# Patient Record
Sex: FEMALE | Race: WHITE | Employment: FULL TIME | URBAN - METROPOLITAN AREA
[De-identification: names, ages, dates, MRNs, and addresses within clinical notes are randomized per-mention and may not be internally consistent; named-entity substitution may affect disease eponyms.]

---

## 2017-05-10 ENCOUNTER — HOSPITAL ENCOUNTER (EMERGENCY)
Age: 47
Discharge: HOME | End: 2017-05-10
Attending: EMERGENCY MEDICINE
Payer: COMMERCIAL

## 2017-05-10 ENCOUNTER — APPOINTMENT (OUTPATIENT)
Dept: CT IMAGING | Age: 47
End: 2017-05-10
Attending: EMERGENCY MEDICINE
Payer: COMMERCIAL

## 2017-05-10 VITALS
RESPIRATION RATE: 19 BRPM | HEART RATE: 104 BPM | BODY MASS INDEX: 29.16 KG/M2 | OXYGEN SATURATION: 93 % | WEIGHT: 175 LBS | HEIGHT: 65 IN | SYSTOLIC BLOOD PRESSURE: 125 MMHG | DIASTOLIC BLOOD PRESSURE: 64 MMHG

## 2017-05-10 DIAGNOSIS — S09.90XA CHI (CLOSED HEAD INJURY), INITIAL ENCOUNTER: ICD-10-CM

## 2017-05-10 DIAGNOSIS — N39.0 URINARY TRACT INFECTION WITHOUT HEMATURIA, SITE UNSPECIFIED: ICD-10-CM

## 2017-05-10 DIAGNOSIS — F10.920 ALCOHOL INTOXICATION, UNCOMPLICATED (HCC): Primary | ICD-10-CM

## 2017-05-10 LAB
ALBUMIN SERPL BCP-MCNC: 3.7 G/DL (ref 3.5–5)
ALBUMIN/GLOB SERPL: 1.1 {RATIO} (ref 1.1–2.2)
ALP SERPL-CCNC: 111 U/L (ref 45–117)
ALT SERPL-CCNC: 68 U/L (ref 12–78)
AMPHET UR QL SCN: NEGATIVE
ANION GAP BLD CALC-SCNC: 16 MMOL/L (ref 5–15)
APPEARANCE UR: ABNORMAL
AST SERPL W P-5'-P-CCNC: 76 U/L (ref 15–37)
BACTERIA URNS QL MICRO: ABNORMAL /HPF
BARBITURATES UR QL SCN: NEGATIVE
BASOPHILS # BLD AUTO: 0 K/UL (ref 0–0.1)
BASOPHILS # BLD: 0 % (ref 0–1)
BENZODIAZ UR QL: NEGATIVE
BILIRUB SERPL-MCNC: 0.3 MG/DL (ref 0.2–1)
BILIRUB UR QL: NEGATIVE
BUN SERPL-MCNC: 8 MG/DL (ref 6–20)
BUN/CREAT SERPL: 16 (ref 12–20)
CALCIUM SERPL-MCNC: 8.3 MG/DL (ref 8.5–10.1)
CANNABINOIDS UR QL SCN: NEGATIVE
CHLORIDE SERPL-SCNC: 110 MMOL/L (ref 97–108)
CK MB CFR SERPL CALC: 2 % (ref 0–2.5)
CK MB SERPL-MCNC: 5.4 NG/ML (ref 5–25)
CK SERPL-CCNC: 270 U/L (ref 26–192)
CO2 SERPL-SCNC: 20 MMOL/L (ref 21–32)
COCAINE UR QL SCN: NEGATIVE
COLOR UR: ABNORMAL
CREAT SERPL-MCNC: 0.51 MG/DL (ref 0.55–1.02)
DRUG SCRN COMMENT,DRGCM: NORMAL
EOSINOPHIL # BLD: 0.1 K/UL (ref 0–0.4)
EOSINOPHIL NFR BLD: 1 % (ref 0–7)
EPITH CASTS URNS QL MICRO: ABNORMAL /LPF
ERYTHROCYTE [DISTWIDTH] IN BLOOD BY AUTOMATED COUNT: 14.8 % (ref 11.5–14.5)
ETHANOL SERPL-MCNC: 334 MG/DL
GLOBULIN SER CALC-MCNC: 3.4 G/DL (ref 2–4)
GLUCOSE SERPL-MCNC: 94 MG/DL (ref 65–100)
GLUCOSE UR STRIP.AUTO-MCNC: NEGATIVE MG/DL
HCG UR QL: NEGATIVE
HCT VFR BLD AUTO: 40 % (ref 35–47)
HGB BLD-MCNC: 12.9 G/DL (ref 11.5–16)
HGB UR QL STRIP: ABNORMAL
KETONES UR QL STRIP.AUTO: NEGATIVE MG/DL
LEUKOCYTE ESTERASE UR QL STRIP.AUTO: ABNORMAL
LYMPHOCYTES # BLD AUTO: 34 % (ref 12–49)
LYMPHOCYTES # BLD: 1.7 K/UL (ref 0.8–3.5)
MCH RBC QN AUTO: 28.6 PG (ref 26–34)
MCHC RBC AUTO-ENTMCNC: 32.3 G/DL (ref 30–36.5)
MCV RBC AUTO: 88.7 FL (ref 80–99)
METHADONE UR QL: NEGATIVE
MONOCYTES # BLD: 0.3 K/UL (ref 0–1)
MONOCYTES NFR BLD AUTO: 6 % (ref 5–13)
NEUTS SEG # BLD: 3 K/UL (ref 1.8–8)
NEUTS SEG NFR BLD AUTO: 59 % (ref 32–75)
NITRITE UR QL STRIP.AUTO: POSITIVE
OPIATES UR QL: NEGATIVE
PCP UR QL: NEGATIVE
PH UR STRIP: 5 [PH] (ref 5–8)
PLATELET # BLD AUTO: 249 K/UL (ref 150–400)
POTASSIUM SERPL-SCNC: 3.7 MMOL/L (ref 3.5–5.1)
PROT SERPL-MCNC: 7.1 G/DL (ref 6.4–8.2)
PROT UR STRIP-MCNC: NEGATIVE MG/DL
RBC # BLD AUTO: 4.51 M/UL (ref 3.8–5.2)
RBC #/AREA URNS HPF: ABNORMAL /HPF (ref 0–5)
SODIUM SERPL-SCNC: 146 MMOL/L (ref 136–145)
SP GR UR REFRACTOMETRY: 1.01 (ref 1–1.03)
TROPONIN I SERPL-MCNC: <0.04 NG/ML
UA: UC IF INDICATED,UAUC: ABNORMAL
UROBILINOGEN UR QL STRIP.AUTO: 0.2 EU/DL (ref 0.2–1)
WBC # BLD AUTO: 5.1 K/UL (ref 3.6–11)
WBC URNS QL MICRO: ABNORMAL /HPF (ref 0–4)

## 2017-05-10 PROCEDURE — 82550 ASSAY OF CK (CPK): CPT | Performed by: EMERGENCY MEDICINE

## 2017-05-10 PROCEDURE — 74011000250 HC RX REV CODE- 250: Performed by: EMERGENCY MEDICINE

## 2017-05-10 PROCEDURE — 74011250636 HC RX REV CODE- 250/636: Performed by: PHYSICIAN ASSISTANT

## 2017-05-10 PROCEDURE — 36415 COLL VENOUS BLD VENIPUNCTURE: CPT | Performed by: EMERGENCY MEDICINE

## 2017-05-10 PROCEDURE — 99285 EMERGENCY DEPT VISIT HI MDM: CPT

## 2017-05-10 PROCEDURE — 81001 URINALYSIS AUTO W/SCOPE: CPT | Performed by: EMERGENCY MEDICINE

## 2017-05-10 PROCEDURE — 84484 ASSAY OF TROPONIN QUANT: CPT | Performed by: EMERGENCY MEDICINE

## 2017-05-10 PROCEDURE — 96366 THER/PROPH/DIAG IV INF ADDON: CPT

## 2017-05-10 PROCEDURE — 96365 THER/PROPH/DIAG IV INF INIT: CPT

## 2017-05-10 PROCEDURE — 85025 COMPLETE CBC W/AUTO DIFF WBC: CPT | Performed by: EMERGENCY MEDICINE

## 2017-05-10 PROCEDURE — 77030008467 HC STPLR SKN COVD -B

## 2017-05-10 PROCEDURE — 96361 HYDRATE IV INFUSION ADD-ON: CPT

## 2017-05-10 PROCEDURE — 87086 URINE CULTURE/COLONY COUNT: CPT | Performed by: EMERGENCY MEDICINE

## 2017-05-10 PROCEDURE — 80053 COMPREHEN METABOLIC PANEL: CPT | Performed by: EMERGENCY MEDICINE

## 2017-05-10 PROCEDURE — 81025 URINE PREGNANCY TEST: CPT | Performed by: EMERGENCY MEDICINE

## 2017-05-10 PROCEDURE — 96375 TX/PRO/DX INJ NEW DRUG ADDON: CPT

## 2017-05-10 PROCEDURE — 74011250636 HC RX REV CODE- 250/636: Performed by: EMERGENCY MEDICINE

## 2017-05-10 PROCEDURE — 72125 CT NECK SPINE W/O DYE: CPT

## 2017-05-10 PROCEDURE — 87077 CULTURE AEROBIC IDENTIFY: CPT | Performed by: EMERGENCY MEDICINE

## 2017-05-10 PROCEDURE — 74011250637 HC RX REV CODE- 250/637: Performed by: PHYSICIAN ASSISTANT

## 2017-05-10 PROCEDURE — L0172 CERV COL SR FOAM 2PC PRE OTS: HCPCS

## 2017-05-10 PROCEDURE — 80307 DRUG TEST PRSMV CHEM ANLYZR: CPT | Performed by: EMERGENCY MEDICINE

## 2017-05-10 PROCEDURE — 87186 SC STD MICRODIL/AGAR DIL: CPT | Performed by: EMERGENCY MEDICINE

## 2017-05-10 PROCEDURE — 93005 ELECTROCARDIOGRAM TRACING: CPT

## 2017-05-10 PROCEDURE — 70450 CT HEAD/BRAIN W/O DYE: CPT

## 2017-05-10 RX ORDER — SODIUM CHLORIDE 0.9 % (FLUSH) 0.9 %
5-10 SYRINGE (ML) INJECTION EVERY 8 HOURS
Status: DISCONTINUED | OUTPATIENT
Start: 2017-05-10 | End: 2017-05-11 | Stop reason: HOSPADM

## 2017-05-10 RX ORDER — ONDANSETRON 2 MG/ML
4 INJECTION INTRAMUSCULAR; INTRAVENOUS
Status: COMPLETED | OUTPATIENT
Start: 2017-05-10 | End: 2017-05-10

## 2017-05-10 RX ORDER — SODIUM CHLORIDE 0.9 % (FLUSH) 0.9 %
5-10 SYRINGE (ML) INJECTION AS NEEDED
Status: DISCONTINUED | OUTPATIENT
Start: 2017-05-10 | End: 2017-05-11 | Stop reason: HOSPADM

## 2017-05-10 RX ORDER — MECLIZINE HCL 12.5 MG 12.5 MG/1
25 TABLET ORAL
Status: COMPLETED | OUTPATIENT
Start: 2017-05-10 | End: 2017-05-10

## 2017-05-10 RX ORDER — ACETAMINOPHEN 500 MG
1000 TABLET ORAL ONCE
Status: COMPLETED | OUTPATIENT
Start: 2017-05-10 | End: 2017-05-10

## 2017-05-10 RX ORDER — CEPHALEXIN 500 MG/1
500 CAPSULE ORAL 3 TIMES DAILY
Qty: 15 CAP | Refills: 0 | Status: SHIPPED | OUTPATIENT
Start: 2017-05-10 | End: 2017-09-17

## 2017-05-10 RX ADMIN — MECLIZINE 25 MG: 12.5 TABLET ORAL at 19:33

## 2017-05-10 RX ADMIN — SODIUM CHLORIDE 1000 ML: 900 INJECTION, SOLUTION INTRAVENOUS at 17:54

## 2017-05-10 RX ADMIN — ONDANSETRON HYDROCHLORIDE 4 MG: 2 INJECTION, SOLUTION INTRAMUSCULAR; INTRAVENOUS at 19:33

## 2017-05-10 RX ADMIN — FOLIC ACID: 5 INJECTION, SOLUTION INTRAMUSCULAR; INTRAVENOUS; SUBCUTANEOUS at 19:34

## 2017-05-10 RX ADMIN — ACETAMINOPHEN 1000 MG: 500 TABLET ORAL at 20:12

## 2017-05-10 NOTE — ED NOTES
Patient is here via EMS after a GLF patient does not remember what happened and is very upset and anxious when she arrived.

## 2017-05-10 NOTE — ED PROVIDER NOTES
HPI Comments: Mary Carmen Vallejo, 55 y.o. female, presents via EMS to ED [facility] ED with cc of acute EtOH intoxication, posterior scalp laceration and GLF x PTA. She admits to drinking wine today as well as consistently over the last few days. She states that she has been starting to consume wine earlier in the day, and states she is depressed. The patient reports falling today without recollection of the HERLINDA. She is unsure if she fell d/t intoxication, or if she cannot remember falling d/t her \"white matter\" in the brain dx by her neurologist. The patient also complains of a few days of \"aching\" generalized abd pain, and neck pain x 1 day. She specifically denies other injuries. PCP: Lachelle Mcgregor MD    PMHx significant for: hypothyroidism, OA, dyspepsia, chronic pain, autoimmune dz, anxiety, depression, osteoporosis, anemia  PSHx significant for: bladder suspension, hysterectomy, back surgery, gastric bypass, hernia repair, cataract removal  Social history significant for: - Tobacco, + EtOH, - Illicit Drug Use    There are no other complaints, changes, or physical findings at this time. Written by ALEJO Ash, as dictated by Larry Wharton. Conrado Carrillo MD.     The history is provided by the patient. No  was used.         Past Medical History:   Diagnosis Date    Anemia     Anxiety     Arthritis     osteoarthritis    Autoimmune disease ([address]     Bowel trouble     Burning with urination     Chronic pain     Depression     Dyspepsia and other specified disorders of function of stomach     Headache     Hypothyroid     Osteoporosis        Past Surgical History:   Procedure Laterality Date    HX BLADDER SUSPENSION      HX CATARACT REMOVAL  12/14    HX GASTRIC BYPASS  8/1999    HX HERNIA REPAIR      HX HYSTERECTOMY  2007    NEUROLOGICAL PROCEDURE UNLISTED  2003, 2004    back surgery          Family History:   Problem Relation Age of Onset    Hypertension Father    [facility] Diabetes Father     Diabetes Paternal Aunt     Heart Disease Maternal Grandmother     Stroke Maternal Grandmother        Social History     Social History    Marital status:      Spouse name: N/A    Number of children: N/A    Years of education: N/A     Occupational History    Not on file. Social History Main Topics    Smoking status: Never Smoker    Smokeless tobacco: Not on file    Alcohol use No    Drug use: No    Sexual activity: Not on file     Other Topics Concern    Not on file     Social History Narrative         ALLERGIES: Review of patient's allergies indicates no known allergies. Review of Systems   Constitutional: Negative. Negative for activity change, appetite change, chills, fatigue, fever and unexpected weight change. HENT: Negative. Negative for congestion, hearing loss, rhinorrhea, sneezing and voice change. Eyes: Negative. Negative for pain and visual disturbance. Respiratory: Negative. Negative for apnea, cough, choking, chest tightness and shortness of breath. Cardiovascular: Negative. Negative for chest pain and palpitations. Gastrointestinal: Positive for abdominal pain. Negative for abdominal distention, blood in stool, diarrhea, nausea and vomiting. Endocrine: Negative for heat intolerance. Genitourinary: Negative. Negative for difficulty urinating, flank pain, frequency and urgency. No discharge   Musculoskeletal: Positive for neck pain. Negative for arthralgias, back pain, myalgias and neck stiffness. Positive for fall   Skin: Positive for wound. Negative for color change and rash. Allergic/Immunologic: Negative for immunocompromised state. Neurological: Negative. Negative for dizziness, seizures, syncope, speech difficulty, weakness, numbness and headaches. Hematological: Negative for adenopathy. Does not bruise/bleed easily. Psychiatric/Behavioral: Negative.   Negative for agitation, behavioral problems, dysphoric mood and suicidal ideas. The patient is not nervous/anxious. Positive for EtOH intoxication   All other systems reviewed and are negative. Vitals:    05/10/17 2100 05/10/17 2130 05/10/17 2200 05/10/17 2204   BP: 112/54 124/57 125/64    Pulse: (!) 112 (!) 120 (!) 105 (!) 104   Resp: 21 23 17 19   SpO2: 93% 93%     Weight:       Height:                Physical Exam   Nursing note and vitals reviewed. Physical Examination: General appearance - WDWN, in no apparent distress  Head - NC, dried blood in hair. Eyes - pupils equal, round and dilated, extraocular eye movements intact, conj/sclera clear, anicteric  Mouth - mucous membranes moist, pharynx normal without lesions  Nose/Ears - nares clear, Tms & canals clear  Neck - supple, no significant adenopathy, trachea midline, no crepitus, c spine tenderness, no step offs  Chest - Normal respiratory effort, clear to auscultation bilaterally, no wheezes/rales/rhonchi  Heart - mildly tachycardic rate and regular rhythm, S1 and S2 normal, no murmurs, gallops, or rubs  Abdomen - soft, nontender, nondistended, nabs, no masses, guarding, rebound or rigidity  Neurological - alert, oriented, normal speech, cranial nerves intact, no focal motor findings, motor & sensory diffusely intact, normal gait  Extremities/MS - peripheral pulses normal, no pedal edema, bruising to left upper arm, FROM, non-tender, no gross deformities, spine diffusely non-tender  Skin - normal coloration and turgor, no rashes, no lesions or lacerations  Written by Brenda Granger ED Scribe, as dictated by [employer].  Juan Miguel Gay MD.     MDM  Number of Diagnoses or Management Options  Alcohol intoxication, uncomplicated ([facility] [address]):   CHI (closed head injury), initial encounter:   Urinary tract infection without hematuria, site unspecified:   Diagnosis management comments:   Ddx: intoxication, fx, bleed        Amount and/or Complexity of Data Reviewed  Clinical lab tests: ordered and reviewed  Tests in the radiology section of CPT®: ordered and reviewed  Tests in the medicine section of CPT®: ordered and reviewed  Review and summarize past medical records: yes  Independent visualization of images, tracings, or specimens: yes    Patient Progress  Patient progress: stable    ED Course       Procedures     EKG interpretation: (Preliminary) 1757  Rhythm: sinus tachycardia; and regular . Rate (approx.): 118; Axis: normal; AK interval: normal; QRS interval: normal ; ST/T wave: normal; Other findings: ST o/w nml. Written by ALEJO Gonzalezibe, as dictated by Darien Wei MD.     Progress Note:  7:42 PM  Patient requesting for C-collar removal  Written by ALEJO Gonzalezibe, as dictated by Darien Wei MD.     Procedure Note - C-collar removed:   7:42 PM  Performed by: RN  C-spine cleared using NEXUS criteria. C-collar removed. Written by ALEJO Gonazlezibchandra, as dictated by Darien Wei MD.    Procedure Note - Laceration Repair:  7:59 PM  Procedure by Celestina Soto. Complexity: Simple  1.5 cm laceration to occipital scalp  was irrigated copiously with NS under jet lavage and draped in a sterile fashion. The wound was explored with the following results: No foreign bodies found. The wound was repaired with Dermabond. The wound was closed with good hemostasis and approximation. Sterile dressing applied. The procedure took 1-15 minutes, and pt tolerated well. Written by ALEJO Woodibchandra, as dictated by Celestina Soto. Progress Note:  10:11 PM  Per RN, pt's  has arrived and wishes to take the pt home.  is requesting referrals for detox clinics. Patient is able to walk with a steady gait, without assistance. Written by ALEJO Gonzalezibe, as dictated by Darien Wei MD.     LABORATORY TESTS:  Recent Results (from the past 12 hour(s))   URINALYSIS W/ REFLEX CULTURE    Collection Time: 05/10/17  5:27 PM Result Value Ref Range    Color YELLOW/STRAW      Appearance CLOUDY (A) CLEAR      Specific gravity 1.006 1.003 - 1.030      pH (UA) 5.0 5.0 - 8.0      Protein NEGATIVE  NEG mg/dL    Glucose NEGATIVE  NEG mg/dL    Ketone NEGATIVE  NEG mg/dL    Bilirubin NEGATIVE  NEG      Blood SMALL (A) NEG      Urobilinogen 0.2 0.2 - 1.0 EU/dL    Nitrites POSITIVE (A) NEG      Leukocyte Esterase SMALL (A) NEG      WBC 5-10 0 - 4 /hpf    RBC 0-5 0 - 5 /hpf    Epithelial cells FEW FEW /lpf    Bacteria 4+ (A) NEG /hpf    UA:UC IF INDICATED URINE CULTURE ORDERED (A) CNI     HCG URINE, QL    Collection Time: 05/10/17  5:27 PM   Result Value Ref Range    HCG urine, Ql. NEGATIVE  NEG     DRUG SCREEN, URINE    Collection Time: 05/10/17  5:27 PM   Result Value Ref Range    AMPHETAMINE NEGATIVE  NEG      BARBITURATES NEGATIVE  NEG      BENZODIAZEPINE NEGATIVE  NEG      COCAINE NEGATIVE  NEG      METHADONE NEGATIVE  NEG      OPIATES NEGATIVE  NEG      PCP(PHENCYCLIDINE) NEGATIVE  NEG      THC (TH-CANNABINOL) NEGATIVE  NEG      Drug screen comment (NOTE)    CBC WITH AUTOMATED DIFF    Collection Time: 05/10/17  5:38 PM   Result Value Ref Range    WBC 5.1 3.6 - 11.0 K/uL    RBC 4.51 3.80 - 5.20 M/uL    HGB 12.9 11.5 - 16.0 g/dL    HCT 40.0 35.0 - 47.0 %    MCV 88.7 80.0 - 99.0 FL    MCH 28.6 26.0 - 34.0 PG    MCHC 32.3 30.0 - 36.5 g/dL    RDW 14.8 (H) 11.5 - 14.5 %    PLATELET 551 959 - 675 K/uL    NEUTROPHILS 59 32 - 75 %    LYMPHOCYTES 34 12 - 49 %    MONOCYTES 6 5 - 13 %    EOSINOPHILS 1 0 - 7 %    BASOPHILS 0 0 - 1 %    ABS. NEUTROPHILS 3.0 1.8 - 8.0 K/UL    ABS. LYMPHOCYTES 1.7 0.8 - 3.5 K/UL    ABS. MONOCYTES 0.3 0.0 - 1.0 K/UL    ABS. EOSINOPHILS 0.1 0.0 - 0.4 K/UL    ABS.  BASOPHILS 0.0 0.0 - 0.1 K/UL   METABOLIC PANEL, COMPREHENSIVE    Collection Time: 05/10/17  5:38 PM   Result Value Ref Range    Sodium 146 (H) 136 - 145 mmol/L    Potassium 3.7 3.5 - 5.1 mmol/L    Chloride 110 (H) 97 - 108 mmol/L    CO2 20 (L) 21 - 32 mmol/L    Anion gap 16 (H) 5 - 15 mmol/L    Glucose 94 65 - 100 mg/dL    BUN 8 6 - 20 MG/DL    Creatinine 0.51 (L) 0.55 - 1.02 MG/DL    BUN/Creatinine ratio 16 12 - 20      GFR est AA >60 >60 ml/min/1.73m2    GFR est non-AA >60 >60 ml/min/1.73m2    Calcium 8.3 (L) 8.5 - 10.1 MG/DL    Bilirubin, total 0.3 0.2 - 1.0 MG/DL    ALT (SGPT) 68 12 - 78 U/L    AST (SGOT) 76 (H) 15 - 37 U/L    Alk. phosphatase 111 45 - 117 U/L    Protein, total 7.1 6.4 - 8.2 g/dL    Albumin 3.7 3.5 - 5.0 g/dL    Globulin 3.4 2.0 - 4.0 g/dL    A-G Ratio 1.1 1.1 - 2.2     ETHYL ALCOHOL    Collection Time: 05/10/17  5:38 PM   Result Value Ref Range    ALCOHOL(ETHYL),SERUM 334 (H) <10 MG/DL   CK W/ CKMB & INDEX    Collection Time: 05/10/17  5:38 PM   Result Value Ref Range     (H) 26 - 192 U/L    CK - MB 5.4 (H) <3.6 NG/ML    CK-MB Index 2.0 0 - 2.5     TROPONIN I    Collection Time: 05/10/17  5:38 PM   Result Value Ref Range    Troponin-I, Qt. <0.04 <0.05 ng/mL   EKG, 12 LEAD, INITIAL    Collection Time: 05/10/17  5:57 PM   Result Value Ref Range    Ventricular Rate 118 BPM    Atrial Rate 118 BPM    P-R Interval 164 ms    QRS Duration 60 ms    Q-T Interval 320 ms    QTC Calculation (Bezet) 448 ms    Calculated P Axis 53 degrees    Calculated R Axis 16 degrees    Calculated T Axis 41 degrees    Diagnosis       Sinus tachycardia  When compared with ECG of 22-NOV-2016 11:22,  No significant change was found         IMAGING RESULTS:  CT Results  (Last 48 hours)               05/10/17 1858  CT HEAD WO CONT Final result    Impression:  IMPRESSION: No acute intracranial findings. Narrative:  EXAM:  CT HEAD WO CONT       INDICATION:   head trauma       COMPARISON: None. TECHNIQUE: Unenhanced CT of the head was performed using 5 mm images. Brain and   bone windows were generated. CT dose reduction was achieved through use of a   standardized protocol tailored for this examination and automatic exposure   control for dose modulation. FINDINGS:   The ventricles and sulci are normal in size, shape and configuration and   midline. There is no significant white matter disease. There is no intracranial   hemorrhage, extra-axial collection, mass, mass effect or midline shift. The   basilar cisterns are open. No acute infarct is identified. The bone windows   demonstrate no abnormalities. The visualized portions of the paranasal sinuses   and mastoid air cells are clear. There is posterior scalp soft tissue swelling. 05/10/17 1858  CT SPINE CERV WO CONT Final result    Impression:  IMPRESSION:   No evidence of acute traumatic injury to the cervical spine. Narrative:  EXAM:  CT CERVICAL SPINE WITHOUT CONTRAST       INDICATION:   Neck pain, chronic, abn neuro exam, xray negative; fall, neck   pain, trauma. COMPARISON: None. TECHNIQUE: Multislice helical CT of the cervical spine was performed without   intravenous contrast administration. Sagittal and coronal reconstructions were   generated. CT dose reduction was achieved through use of a standardized   protocol tailored for this examination and automatic exposure control for dose   modulation. CONTRAST: None. FINDINGS:       The alignment is within normal limits. There is no fracture or subluxation. The   odontoid process is intact. The craniocervical junction is within normal limits. The prevertebral soft tissues are within normal limits. There is spurring   between the anterior arch of C1 and the odontoid. C2-C3:  There is no spinal canal or neural foraminal stenosis. C3-C4:  There is no spinal canal or neural foraminal stenosis. C4-C5:  There is no spinal canal or neural foraminal stenosis. C5-C6:  There is disc space narrowing. There is uncinate process hypertrophy   without significant neural foraminal narrowing. C6-C7:  There is no spinal canal or neural foraminal stenosis.        C7-T1:  There is no spinal canal or neural foraminal stenosis. MEDICATIONS GIVEN:  Medications   sodium chloride (NS) flush 5-10 mL (not administered)   sodium chloride (NS) flush 5-10 mL (not administered)   sodium chloride 0.9 % bolus infusion 1,000 mL (0 mL IntraVENous IV Completed 5/10/17 2016)   0.9% sodium chloride 1,000 mL with mvi, adult no. 4 with vit K 10 mL, thiamine 522 mg, folic acid 1 mg infusion ( IntraVENous IV Completed 5/10/17 2225)   ondansetron (ZOFRAN) injection 4 mg (4 mg IntraVENous Given 5/10/17 1933)   meclizine (ANTIVERT) tablet 25 mg (25 mg Oral Given 5/10/17 1933)   acetaminophen (TYLENOL) tablet 1,000 mg (1,000 mg Oral Given 5/10/17 2012)       IMPRESSION:  1. Alcohol intoxication, uncomplicated ([facility] [address]    2. CHI (closed head injury), initial encounter    3. Urinary tract infection without hematuria, site unspecified        PLAN:  1. Discharge Medication List as of 5/10/2017 10:13 PM      START taking these medications    Details   cephALEXin (KEFLEX) 500 mg capsule Take 1 Cap by mouth three (3) times daily. , Print, Disp-15 Cap, R-0         CONTINUE these medications which have NOT CHANGED    Details   ALPRAZolam (XANAX) 1 mg tablet Take 0.5 Tabs by mouth two (2) times daily as needed for Anxiety for up to 10 doses. Max Daily Amount: 1 mg., Print, Disp-5 Tab, R-0      pregabalin (LYRICA) 50 mg capsule Take 1 Cap by mouth two (2) times a day. Max Daily Amount: 100 mg., Print, Disp-60 Cap, R-0      traMADol (ULTRAM) 50 mg tablet Take 1 Tab by mouth every six (6) hours as needed for Pain. Max Daily Amount: 200 mg. Indications: PAIN, No Print, Disp-1 Tab, R-0      L. acidoph & paracasei- S therm- Bifido (KATT-Q) 8 billion cell cap cap Take 1 Cap by mouth daily. , Print, Disp-30 Cap, R-1      magnesium chloride (SLOW MAG) 71.5 mg tablet Take 1 Tab by mouth daily. , Print, Disp-30 Tab, R-5      VIT C/VIT E/LUTEIN/MIN/OMEGA-3 (OCUVITE PO) Take  by mouth., Historical Med      levothyroxine (SYNTHROID) 50 mcg tablet Take  by mouth Daily (before breakfast). , Historical Med      cyanocobalamin (VITAMIN B-12) 1,000 mcg/mL injection 1,000 mcg by IntraMUSCular route every thirty (30) days. , Historical Med      ergocalciferol (VITAMIN D2) 50,000 unit capsule Take 50,000 Units by mouth Every Thursday., Historical Med      furosemide (LASIX) 20 mg tablet Take  by mouth as needed., Historical Med           2. Follow-up Information     Follow up With Details Comments Contact Info    Priscila Coker MD Schedule an appointment as soon as possible for a visit  [facility] [address] [facility]  [phone]          Return to ED if worse     Discharge Note:  10:14 PM  The pt is ready for discharge. The pt's signs, symptoms, diagnosis, and discharge instructions have been discussed and pt has conveyed their understanding. The pt is to follow up as recommended or return to ER should their symptoms worsen. Plan has been discussed and pt is in agreement. This note is prepared by Ezio Munoz, acting as a Scribe for [employer]. Sarika Young, [address] [place] [address]. Sarika Young MD: The scribe's documentation has been prepared under my direction and personally reviewed by me in its entirety. I confirm that the notes above accurately reflects all work, treatment, procedures, and medical decision making performed by me.

## 2017-05-11 LAB
ATRIAL RATE: 118 BPM
CALCULATED P AXIS, ECG09: 53 DEGREES
CALCULATED R AXIS, ECG10: 16 DEGREES
CALCULATED T AXIS, ECG11: 41 DEGREES
DIAGNOSIS, 93000: NORMAL
P-R INTERVAL, ECG05: 164 MS
Q-T INTERVAL, ECG07: 320 MS
QRS DURATION, ECG06: 60 MS
QTC CALCULATION (BEZET), ECG08: 448 MS
VENTRICULAR RATE, ECG03: 118 BPM

## 2017-05-11 NOTE — DISCHARGE INSTRUCTIONS
Acute Alcohol Intoxication: Care Instructions  Your Care Instructions  You have had treatment to help your body rid itself of alcohol. Too much alcohol upsets the body's fluid balance. Your doctor may have given you fluids and vitamins. For some people, drinking too much alcohol is a one-time event. For others, it is an ongoing problem. In either case, it is serious. It can be life-threatening. Follow-up care is a key part of your treatment and safety. Be sure to make and go to all appointments, and call your doctor if you are having problems. It's also a good idea to know your test results and keep a list of the medicines you take. How can you care for yourself at home? · Be safe with medicines. Take your medicines exactly as prescribed. Call your doctor if you think you are having a problem with your medicine. · Your doctor may have prescribed disulfiram (Antabuse). Do not drink any alcohol while you are taking this medicine. You may have severe or even life-threatening side effects from even small amounts of alcohol. · If you were given medicine to prevent nausea, be sure to take it exactly as prescribed. · Before you take any medicine, tell your doctor if:  ¨ You have had a bad reaction to any medicines in the past.  ¨ You are taking other medicines, including over-the-counter ones, or have other health problems. ¨ You are or could be pregnant. · Be prepared to have some symptoms of withdrawal in the next few days. · Drink plenty of liquids in the next few days. · Seek help if you need it to stop drinking. Getting counseling and joining a support group can help you stay sober. Try a support group such as Alcoholics Anonymous. · Avoid alcohol when you take medicines. It can react with many medicines and cause serious problems. When should you call for help? Call 911 anytime you think you may need emergency care.  For example, call if:  · You feel confused and are seeing things that are not there.  · You are thinking about killing yourself or hurting others. · You have a seizure. · You vomit blood or what looks like coffee grounds. Call your doctor now or seek immediate medical care if:  · You have trembling, restlessness, sweating, and other withdrawal symptoms that are new or that get worse. · Your withdrawal symptoms come back after not bothering you for days or weeks. · You can't stop vomiting. Watch closely for changes in your health, and be sure to contact your doctor if:  · You need help to stop drinking. Where can you learn more? Go to http://praful-monserrat.info/. Enter T102 in the search box to learn more about \"Acute Alcohol Intoxication: Care Instructions. \"  Current as of: November 3, 2016  Content Version: 11.2  © 6380-4367 [employer]. Care instructions adapted under license by [facility] [employer] [facility] (which disclaims liability or warranty for this information). If you have questions about a medical condition or this instruction, always ask your healthcare professional. [place] [address] any warranty or liability for your use of this information. Urinary Tract Infection in Women: Care Instructions  Your Care Instructions    A urinary tract infection, or UTI, is a general term for an infection anywhere between the kidneys and the urethra (where urine comes out). Most UTIs are bladder infections. They often cause pain or burning when you urinate. UTIs are caused by bacteria and can be cured with antibiotics. Be sure to complete your treatment so that the infection goes away. Follow-up care is a key part of your treatment and safety. Be sure to make and go to all appointments, and call your doctor if you are having problems. It's also a good idea to know your test results and keep a list of the medicines you take. How can you care for yourself at home? · Take your antibiotics as directed.  Do not stop taking them just because you feel better. You need to take the full course of antibiotics. · Drink extra water and other fluids for the next day or two. This may help wash out the bacteria that are causing the infection. (If you have kidney, heart, or liver disease and have to limit fluids, talk with your doctor before you increase your fluid intake.)  · Avoid drinks that are carbonated or have caffeine. They can irritate the bladder. · Urinate often. Try to empty your bladder each time. · To relieve pain, take a hot bath or lay a heating pad set on low over your lower belly or genital area. Never go to sleep with a heating pad in place. To prevent UTIs  · Drink plenty of water each day. This helps you urinate often, which clears bacteria from your system. (If you have kidney, heart, or liver disease and have to limit fluids, talk with your doctor before you increase your fluid intake.)  · Urinate when you need to. · Urinate right after you have sex. · Change sanitary pads often. · Avoid douches, bubble baths, feminine hygiene sprays, and other feminine hygiene products that have deodorants. · After going to the bathroom, wipe from front to back. When should you call for help? Call your doctor now or seek immediate medical care if:  · Symptoms such as fever, chills, nausea, or vomiting get worse or appear for the first time. · You have new pain in your back just below your rib cage. This is called flank pain. · There is new blood or pus in your urine. · You have any problems with your antibiotic medicine. Watch closely for changes in your health, and be sure to contact your doctor if:  · You are not getting better after taking an antibiotic for 2 days. · Your symptoms go away but then come back. Where can you learn more? Go to http://praful-monserrat.info/. Enter S208 in the search box to learn more about \"Urinary Tract Infection in Women: Care Instructions. \"  Current as of: November 28, 2016  Content Version: 11.2  © 4575-5511 [employer]. Care instructions adapted under license by [employer] (which disclaims liability or warranty for this information). If you have questions about a medical condition or this instruction, always ask your healthcare professional. [address] any warranty or liability for your use of this information. Head Injury: After Your Visit  Your Care Instructions  You have had a concussion. A concussion means you hit your head hard enough to injure your brain. You may have symptoms that last for a few days to months. You may also have changes in how well you think, concentrate, or remember; changes in your sleep patterns; or other symptoms. Although this is common after a concussion, any symptoms that are new or that are getting worse could be signs of a more serious problem. The doctor has checked you carefully, but problems can develop later. If you notice any problems or new symptoms, get medical treatment right away. Follow-up care is a key part of your treatment and safety. Be sure to make and go to all appointments, and call your doctor if you are having problems. It's also a good idea to know your test results and keep a list of the medicines you take. How can you care for yourself at home? · Have another adult watch you closely for the next 24 hours. That person should check for signs that your head injury is getting worse. · Put ice or a cold pack on the sore area for 10 to 20 minutes at a time. Put a thin cloth between the ice and your skin. · Ask your doctor if you can take an over-the-counter pain medicine, such as acetaminophen (Tylenol), ibuprofen (Advil, Motrin), or naproxen (Aleve). Read and follow all instructions on the label. Do not take two or more pain medicines at the same time unless the doctor told you to. Many pain medicines have acetaminophen, which is Tylenol. Too much acetaminophen (Tylenol) can be harmful.   · You may sleep. If your doctor tells you to, have another adult check you at the suggested times to make sure you are able to wake up, recognize the other adult, and act normally. · Take it easy for the next few days or longer if you are not feeling well. · Do not drink any alcohol for 24 hours or until your doctor tells you it is okay. When should you call for help? Call 911 anytime you think you may need emergency care. For example, call if:  · You have a seizure. · You passed out (lost consciousness). · You feel very sleepy or confused. Call your doctor now or seek immediate medical care if:  · You have a new or worse headache. · You have new or worse nausea or vomiting. · You have a new watery (not like mucus from a cold) or bloody fluid coming from your nose or ears. · You have tingling, weakness, or numbness in any part of your body. · You have trouble walking. Watch closely for changes in your health, and be sure to contact your doctor if you do not get better as expected. Where can you learn more? Go to Tamtron.be  Enter X549 in the search box to learn more about \"Head Injury: After Your Visit. \"   © 4003-7808 [employer]. Care instructions adapted under license by [employer] (which disclaims liability or warranty for this information). This care instruction is for use with your licensed healthcare professional. If you have questions about a medical condition or this instruction, always ask your healthcare professional. [place] [address] any warranty or liability for your use of this information.   Content Version: 1.4.777276; Last Revised: June 27, 2012

## 2017-05-11 NOTE — ED NOTES
Patient assisted into wheelchair for discharge. Wheeled patient out to car and assisted patient into car.

## 2017-05-11 NOTE — ED NOTES
Discharge paper work given to patient by DR. Tonya Fulton patients questions and concerns answered.  Patient discharged

## 2017-05-12 LAB
BACTERIA SPEC CULT: ABNORMAL
BACTERIA SPEC CULT: ABNORMAL
CC UR VC: ABNORMAL
SERVICE CMNT-IMP: ABNORMAL

## 2017-05-12 RX ORDER — NITROFURANTOIN 25; 75 MG/1; MG/1
100 CAPSULE ORAL 2 TIMES DAILY
Qty: 14 CAP | Refills: 0 | Status: SHIPPED | OUTPATIENT
Start: 2017-05-12 | End: 2017-05-19

## 2017-05-12 NOTE — PROGRESS NOTES
Pt contacted re: urine culture results. Pt remains symptomatic. Macrobid efiled with [facility] [address] at her request.  She reportedly has had difficulty with clearing multiple urinary tract infections within the last few months. Advised the [address] should cover; however, given intermediate sensitivity to the second strain of ecoli, recommended follow up with her Urologist.  Good understanding noted.

## 2017-09-17 ENCOUNTER — HOSPITAL ENCOUNTER (EMERGENCY)
Age: 47
Discharge: HOME | End: 2017-09-17
Attending: EMERGENCY MEDICINE
Payer: COMMERCIAL

## 2017-09-17 ENCOUNTER — APPOINTMENT (OUTPATIENT)
Dept: GENERAL RADIOLOGY | Age: 47
End: 2017-09-17
Attending: PHYSICIAN ASSISTANT
Payer: COMMERCIAL

## 2017-09-17 VITALS
HEIGHT: 64 IN | TEMPERATURE: 98.4 F | WEIGHT: 173.06 LBS | HEART RATE: 97 BPM | OXYGEN SATURATION: 95 % | BODY MASS INDEX: 29.55 KG/M2 | SYSTOLIC BLOOD PRESSURE: 157 MMHG | RESPIRATION RATE: 16 BRPM | DIASTOLIC BLOOD PRESSURE: 100 MMHG
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
## 2017-09-17 DIAGNOSIS — T07.XXXA MULTIPLE CONTUSIONS: ICD-10-CM

## 2017-09-17 DIAGNOSIS — S93.602A SPRAIN OF LEFT FOOT, INITIAL ENCOUNTER: Primary | ICD-10-CM

## 2017-09-17 PROCEDURE — 99284 EMERGENCY DEPT VISIT MOD MDM: CPT

## 2017-09-17 PROCEDURE — 73620 X-RAY EXAM OF FOOT: CPT

## 2017-09-17 PROCEDURE — 77030036687 HC SHOE PSTOP S2SG -A

## 2017-09-17 PROCEDURE — 74011250637 HC RX REV CODE- 250/637: Performed by: PHYSICIAN ASSISTANT

## 2017-09-17 RX ORDER — NEBIVOLOL 5 MG/1
5 TABLET ORAL
Status: ON HOLD | COMMUNITY
End: 2019-05-06

## 2017-09-17 RX ORDER — HYDROCODONE BITARTRATE AND ACETAMINOPHEN 5; 325 MG/1; MG/1
1 TABLET ORAL
Qty: 10 TAB | Refills: 0 | Status: SHIPPED | OUTPATIENT
Start: 2017-09-17 | End: 2017-09-20

## 2017-09-17 RX ORDER — ONDANSETRON 4 MG/1
4 TABLET, ORALLY DISINTEGRATING ORAL
Qty: 10 TAB | Refills: 0 | Status: SHIPPED | OUTPATIENT
Start: 2017-09-17 | End: 2017-11-16

## 2017-09-17 RX ORDER — HYDROCODONE BITARTRATE AND ACETAMINOPHEN 5; 325 MG/1; MG/1
1 TABLET ORAL
Status: COMPLETED | OUTPATIENT
Start: 2017-09-17 | End: 2017-09-17

## 2017-09-17 RX ORDER — ONDANSETRON 4 MG/1
4 TABLET, ORALLY DISINTEGRATING ORAL
Status: COMPLETED | OUTPATIENT
Start: 2017-09-17 | End: 2017-09-17

## 2017-09-17 RX ADMIN — HYDROCODONE BITARTRATE AND ACETAMINOPHEN 1 TABLET: 5; 325 TABLET ORAL at 10:15

## 2017-09-17 RX ADMIN — ONDANSETRON 4 MG: 4 TABLET, ORALLY DISINTEGRATING ORAL at 10:20

## 2017-09-17 NOTE — ED NOTES
THELMA Powell reviewed discharge instructions with the patient and spouse. The patient and spouse verbalized understanding. Patient discharged home with vitals at baseline. Patient medicated prior to discharge and has transportation provided by spouse. Patient ambulatory to vehicle with use of crutches.

## 2017-09-17 NOTE — DISCHARGE INSTRUCTIONS
Foot Sprain: Care Instructions  Your Care Instructions    A foot sprain occurs when you stretch or tear the ligaments around your foot. Ligaments are the tough tissues that connect one bone to another. A sprain can happen when you run, fall, or hit your toe against something. Sprains often happen when you jump or change direction quickly. This may occur when you play basketball, soccer, or other sports. Most foot sprains will get better with treatment at home. Follow-up care is a key part of your treatment and safety. Be sure to make and go to all appointments, and call your doctor if you are having problems. It's also a good idea to know your test results and keep a list of the medicines you take. How can you care for yourself at home? · Walk or put weight on your sprained foot as long as it does not hurt. · If your doctor gave you a splint or immobilizer, wear it as directed. If you were given crutches, use them as directed. · For the first 2 days after your injury, avoid hot showers, hot tubs, or hot packs. They may increase swelling. · Put ice or a cold pack on your foot for 10 to 20 minutes at a time to stop swelling. Try this every 1 to 2 hours for 3 days (when you are awake) or until the swelling goes down. Put a thin cloth between the ice pack and your skin. Keep your splint dry. · After 2 or 3 days, if your swelling is gone, put a heating pad (set on low) or a warm cloth on your foot. Some doctors suggest that you go back and forth between hot and cold treatments. · Prop up your foot on a pillow when you ice it or anytime you sit or lie down. Try to keep it above the level of your heart. This will help reduce swelling. · Take pain medicines exactly as directed. ¨ If the doctor gave you a prescription medicine for pain, take it as prescribed. ¨ If you are not taking a prescription pain medicine, ask your doctor if you can take an over-the-counter medicine.   · Do any exercises that your doctor or physical therapist suggests. · Return to your usual exercise gradually as you feel better. When should you call for help? Call your doctor now or seek immediate medical care if:  · You have increased or severe pain. · Your toes are cool or pale or change color. · Your wrap or splint feels too tight. · You have signs of a blood clot, such as:  ¨ Pain in your calf, back of the knee, thigh, or groin. ¨ Redness and swelling in your leg or groin. · You have tingling, weakness, or numbness in your leg or foot. Watch closely for changes in your health, and be sure to contact your doctor if:  · You cannot put any weight on your foot. · You get a fever. · You do not get better as expected. Where can you learn more? Go to http://praful-monserrat.info/. Enter S947 in the search box to learn more about \"Foot Sprain: Care Instructions. \"  Current as of: March 21, 2017  Content Version: 11.3  © 8892-0734 [employer]. Care instructions adapted under license by [employer] (which disclaims liability or warranty for this information). If you have questions about a medical condition or this instruction, always ask your healthcare professional. [place] [address] any warranty or liability for your use of this information. Bruises: Care Instructions  Your Care Instructions    Bruises occur when small blood vessels under the skin tear or rupture, most often from a twist, bump, or fall. Blood leaks into tissues under the skin and causes a black-and-blue spot that often turns colors, including purplish black, reddish blue, or yellowish green, as the bruise heals. Bruises hurt, but most are not serious and will go away on their own within 2 to 4 weeks. Sometimes, gravity causes them to spread down the body. A leg bruise usually will take longer to heal than a bruise on the face or arms. Follow-up care is a key part of your treatment and safety.  Be sure to make and go to all appointments, and call your doctor if you are having problems. Its also a good idea to know your test results and keep a list of the medicines you take. How can you care for yourself at home? · Take pain medicines exactly as directed. ¨ If the doctor gave you a prescription medicine for pain, take it as prescribed. ¨ If you are not taking a prescription pain medicine, ask your doctor if you can take an over-the-counter medicine. · Put ice or a cold pack on the area for 10 to 20 minutes at a time. Put a thin cloth between the ice and your skin. · If you can, prop up the bruised area on pillows as much as possible for the next few days. Try to keep the bruise above the level of your heart. When should you call for help? Call your doctor now or seek immediate medical care if:  · You have signs of infection, such as:  ¨ Increased pain, swelling, warmth, or redness. ¨ Red streaks leading from the bruise. ¨ Pus draining from the bruise. ¨ A fever. · You have a bruise on your leg and signs of a blood clot, such as:  ¨ Increasing redness and swelling along with warmth, tenderness, and pain in the bruised area. ¨ Pain in your calf, back of the knee, thigh, or groin. ¨ Redness and swelling in your leg or groin. · Your pain gets worse. Watch closely for changes in your health, and be sure to contact your doctor if:  · You do not get better as expected. Where can you learn more? Go to http://praful-monserrat.info/. Enter [phone] in the search box to learn more about \"Bruises: Care Instructions. \"  Current as of: March 20, 2017  Content Version: 11.3  © 0898-7868 [employer]. Care instructions adapted under license by [facility] (which disclaims liability or warranty for this information).  If you have questions about a medical condition or this instruction, always ask your healthcare professional. Alvaro Elkins disclaims any warranty or liability for your use of this information.

## 2017-09-17 NOTE — ED PROVIDER NOTES
HPI Comments: Tim Disla is a 55 y.o. female with PMhx significant for hypothyroid, osteoarthritis, and osteoporosis, who presents ambulatory with  to the [address] [facility] ED with cc of left foot pain s/p a fall yesterday while wearing tennis shoes. Pt reports she fell from a step on the porch into the bushes onto her right side, but twisted her left ankle in the process. She states the pain has radiated to her left knee. She reports the worst pain is to the bottom of the left foot, which has burning pain, and she cannot bend her toes without pain shooting up her leg. Pt states she has used ice on the area with significant improvement to the swelling. She has also taken Aleve without improvement. Pt reports she broke her foot at the age of 12 with most toes broken, a cracked heel, and a broken arch; she states the foot is now more of a flat foot, and she has had issues with the foot ever since. Pt also reports she sustained multiple small bruises and scratches to her LUE, as well as large contusions to her RUE and RLE. Pt specifically denies neck pain, back pain, abdominal pain, chest pain, fever, headache, cough, cold symptoms, and urinary symptoms. Social Hx: - Tobacco, - EtOH, - Illicit Drugs    PCP: Corine De León MD    There are no other complaints, changes or physical findings at this time. The history is provided by the patient. No  was used.         Past Medical History:   Diagnosis Date    Anemia     Anxiety     Arthritis     osteoarthritis    Autoimmune disease ([facility] [address]     Bowel trouble     Burning with urination     Chronic pain     Depression     Dyspepsia and other specified disorders of function of stomach     Headache     Hypothyroid     Osteoporosis        Past Surgical History:   Procedure Laterality Date    HX BLADDER SUSPENSION      HX CATARACT REMOVAL  12/14    HX GASTRIC BYPASS  8/1999    HX HERNIA REPAIR      HX HYSTERECTOMY  2007    NEUROLOGICAL PROCEDURE UNLISTED  2003, 2004    back surgery          Family History:   Problem Relation Age of Onset    Hypertension Father     Diabetes Father     Diabetes Paternal Aunt     Heart Disease Maternal Grandmother     Stroke Maternal Grandmother        Social History     Social History    Marital status:      Spouse name: N/A    Number of children: N/A    Years of education: N/A     Occupational History    Not on file. Social History Main Topics    Smoking status: Never Smoker    Smokeless tobacco: Not on file    Alcohol use No    Drug use: No    Sexual activity: Not on file     Other Topics Concern    Not on file     Social History Narrative         ALLERGIES: Review of patient's allergies indicates no known allergies. Review of Systems   Constitutional: Negative. Negative for chills and fever. HENT: Negative. Negative for congestion, rhinorrhea and sore throat. Respiratory: Negative. Negative for cough and shortness of breath. Cardiovascular: Negative. Negative for chest pain. Gastrointestinal: Negative. Negative for abdominal pain, diarrhea, nausea and vomiting. Genitourinary: Negative for dysuria, frequency and hematuria. Musculoskeletal: Positive for arthralgias (L ankle, foot and knee). Negative for back pain and neck pain. Skin: Positive for color change (Contusions to LUE, RUE, and RLE) and wound (Scratches to LUE and RUE). Neurological: Negative. Negative for dizziness, syncope, weakness and headaches. All other systems reviewed and are negative. Patient Vitals for the past 12 hrs:   Temp Pulse Resp BP SpO2   09/17/17 0937 98.4 °F (36.9 °C) 97 16 (!) 157/100 95 %            Physical Exam   Constitutional: She is oriented to person, place, and time. She appears well-developed and well-nourished. No distress. 56 yo  female   HENT:   Head: Normocephalic and atraumatic. Eyes: Conjunctivae are normal. Right eye exhibits no discharge.  Left eye exhibits no discharge. Neck: Normal range of motion. Neck supple. Cardiovascular: Normal rate, regular rhythm, normal heart sounds and intact distal pulses. No murmur heard. Pulmonary/Chest: Effort normal and breath sounds normal. No respiratory distress. She has no wheezes. Musculoskeletal:   L FOOT: + swelling and ecchymosis. TTP the arch of the foot and top of the foot. ROM limited with pain. No crepitus or laxity. Distal NV intact. Cap refill < 2 sec. Ambulatory with limp. Multiple other contusions - R lateral upper leg, R lateral upper arm, L upper arm, R toe, L knee   Neurological: She is alert and oriented to person, place, and time. Skin: Skin is warm and dry. She is not diaphoretic. Psychiatric: She has a normal mood and affect. Her behavior is normal.   Nursing note and vitals reviewed. MDM  Number of Diagnoses or Management Options  Fall, initial encounter:   Multiple contusions:   Sprain of left foot, initial encounter:   Diagnosis management comments:   DDx: Fracture, strain, sprain, contusion. Amount and/or Complexity of Data Reviewed  Tests in the radiology section of CPT®: ordered and reviewed  Review and summarize past medical records: yes  Independent visualization of images, tracings, or specimens: yes    Patient Progress  Patient progress: stable    ED Course       Procedures      LABORATORY TESTS:  No results found for this or any previous visit (from the past 12 hour(s)). IMAGING RESULTS:  XR FOOT LT AP/LAT   Final Result   EXAM:  XR FOOT LT AP/LAT     INDICATION:   Trauma. Left foot pain.     COMPARISON:  None.     FINDINGS:  Two views of the left foot demonstrate no fracture or other acute  osseous or articular abnormality. The soft tissues are within normal limits.     IMPRESSION  IMPRESSION:  No acute abnormality.        MEDICATIONS GIVEN:  Medications   HYDROcodone-acetaminophen (NORCO) 5-325 mg per tablet 1 Tab (1 Tab Oral Given 9/17/17 1015) ondansetron (ZOFRAN ODT) tablet 4 mg (4 mg Oral Given 9/17/17 1020)       IMPRESSION:  1. Sprain of left foot, initial encounter    2. Multiple contusions    3. Fall, initial encounter        PLAN:  1. Discharge Medication List as of 9/17/2017 10:21 AM      START taking these medications    Details   HYDROcodone-acetaminophen (NORCO) 5-325 mg per tablet Take 1 Tab by mouth every six (6) hours as needed for Pain for up to 3 days. Max Daily Amount: 4 Tabs., Print, Disp-10 Tab, R-0      ondansetron (ZOFRAN ODT) 4 mg disintegrating tablet Take 1 Tab by mouth every eight (8) hours as needed for Nausea. , Print, Disp-10 Tab, R-0         CONTINUE these medications which have NOT CHANGED    Details   nebivolol (BYSTOLIC) 5 mg tablet Take  by mouth daily. , Historical Med      VIT C/VIT E/LUTEIN/MIN/OMEGA-3 (OCUVITE PO) Take  by mouth., Historical Med      levothyroxine (SYNTHROID) 50 mcg tablet Take  by mouth Daily (before breakfast). , Historical Med      cyanocobalamin (VITAMIN B-12) 1,000 mcg/mL injection 1,000 mcg by IntraMUSCular route every thirty (30) days. , Historical Med      ergocalciferol (VITAMIN D2) 50,000 unit capsule Take 50,000 Units by mouth Every Thursday., Historical Med      furosemide (LASIX) 20 mg tablet Take  by mouth as needed., Historical Med      L. acidoph & paracasei- S therm- Bifido (KATT-Q) 8 billion cell cap cap Take 1 Cap by mouth daily. , Print, Disp-30 Cap, R-1           2. Follow-up Information     Follow up With Details Comments Dennise Cornejo MD In 1 week As needed [address], DO In 1 week As needed [place] [address]  [phone]        3. Rest, ice and elevate  4. Use ACE, cast shoe, and crutches as discussed  Return to ED if worse     DISCHARGE NOTE  10:26 AM  The patient has been re-evaluated and is ready for discharge. Reviewed available results with patient. Counseled patient on diagnosis and care plan. Patient has expressed understanding, and all questions have been answered. Patient agrees with plan and agrees to follow up as recommended, or return to the ED if their symptoms worsen. Discharge instructions have been provided and explained to the patient, along with reasons to return to the ED. Attestation Note:  This note is prepared by Glory Thao, acting as Scribe for Neena.    ALEENA Lizama: The scribe's documentation has been prepared under my direction and personally reviewed by me in its entirety. I confirm that the note above accurately reflects all work, treatment, procedures, and medical decision making performed by me.

## 2017-09-17 NOTE — ED NOTES
Patient reports to ED after falling down approximately 2 steps and twisting her foot. Patient has scattered bruising on arms and legs. Patient states \"I have an iron deficiency and bruise very easily. \"     She said she fell into the bushes and scarped the inside of her left elbow.

## 2017-11-16 ENCOUNTER — HOSPITAL ENCOUNTER (INPATIENT)
Age: 47
LOS: 6 days | Discharge: HOME HEALTH | DRG: 872 | End: 2017-11-22
Attending: EMERGENCY MEDICINE | Admitting: INTERNAL MEDICINE
Payer: COMMERCIAL

## 2017-11-16 ENCOUNTER — APPOINTMENT (OUTPATIENT)
Dept: CT IMAGING | Age: 47
DRG: 872 | End: 2017-11-16
Attending: INTERNAL MEDICINE
Payer: COMMERCIAL

## 2017-11-16 ENCOUNTER — APPOINTMENT (OUTPATIENT)
Dept: GENERAL RADIOLOGY | Age: 47
DRG: 872 | End: 2017-11-16
Attending: EMERGENCY MEDICINE
Payer: COMMERCIAL

## 2017-11-16 DIAGNOSIS — N39.0 URINARY TRACT INFECTION WITHOUT HEMATURIA, SITE UNSPECIFIED: Primary | ICD-10-CM

## 2017-11-16 DIAGNOSIS — Z16.12 ESBL (EXTENDED SPECTRUM BETA-LACTAMASE) PRODUCING BACTERIA INFECTION: ICD-10-CM

## 2017-11-16 DIAGNOSIS — A49.9 ESBL (EXTENDED SPECTRUM BETA-LACTAMASE) PRODUCING BACTERIA INFECTION: ICD-10-CM

## 2017-11-16 DIAGNOSIS — E87.20 LACTIC ACIDOSIS: ICD-10-CM

## 2017-11-16 DIAGNOSIS — E16.2 HYPOGLYCEMIA: ICD-10-CM

## 2017-11-16 PROBLEM — A41.9 SEVERE SEPSIS (HCC): Status: ACTIVE | Noted: 2017-11-16

## 2017-11-16 PROBLEM — N12 PYELONEPHRITIS: Status: ACTIVE | Noted: 2017-11-16

## 2017-11-16 PROBLEM — R65.20 SEVERE SEPSIS (HCC): Status: ACTIVE | Noted: 2017-11-16

## 2017-11-16 LAB
ALBUMIN SERPL-MCNC: 3.5 G/DL (ref 3.5–5)
ALBUMIN/GLOB SERPL: 1.1 {RATIO} (ref 1.1–2.2)
ALP SERPL-CCNC: 71 U/L (ref 45–117)
ALT SERPL-CCNC: 47 U/L (ref 12–78)
ANION GAP SERPL CALC-SCNC: 11 MMOL/L (ref 5–15)
APPEARANCE UR: CLEAR
AST SERPL-CCNC: 41 U/L (ref 15–37)
BACTERIA URNS QL MICRO: ABNORMAL /HPF
BASOPHILS # BLD: 0 K/UL (ref 0–0.1)
BASOPHILS NFR BLD: 0 % (ref 0–1)
BILIRUB SERPL-MCNC: 0.2 MG/DL (ref 0.2–1)
BILIRUB UR QL: NEGATIVE
BUN SERPL-MCNC: 6 MG/DL (ref 6–20)
BUN/CREAT SERPL: 10 (ref 12–20)
CALCIUM SERPL-MCNC: 7.8 MG/DL (ref 8.5–10.1)
CHLORIDE SERPL-SCNC: 109 MMOL/L (ref 97–108)
CO2 SERPL-SCNC: 22 MMOL/L (ref 21–32)
COLOR UR: ABNORMAL
CREAT SERPL-MCNC: 0.62 MG/DL (ref 0.55–1.02)
EOSINOPHIL # BLD: 0.1 K/UL (ref 0–0.4)
EOSINOPHIL NFR BLD: 2 % (ref 0–7)
EPITH CASTS URNS QL MICRO: ABNORMAL /LPF
ERYTHROCYTE [DISTWIDTH] IN BLOOD BY AUTOMATED COUNT: 13.1 % (ref 11.5–14.5)
GLOBULIN SER CALC-MCNC: 3.1 G/DL (ref 2–4)
GLUCOSE BLD STRIP.AUTO-MCNC: 102 MG/DL (ref 65–100)
GLUCOSE BLD STRIP.AUTO-MCNC: 108 MG/DL (ref 65–100)
GLUCOSE BLD STRIP.AUTO-MCNC: 58 MG/DL (ref 65–100)
GLUCOSE BLD STRIP.AUTO-MCNC: 78 MG/DL (ref 65–100)
GLUCOSE SERPL-MCNC: 66 MG/DL (ref 65–100)
GLUCOSE UR STRIP.AUTO-MCNC: NEGATIVE MG/DL
HCG SERPL QL: NEGATIVE
HCT VFR BLD AUTO: 38.3 % (ref 35–47)
HGB BLD-MCNC: 12.7 G/DL (ref 11.5–16)
HGB UR QL STRIP: NEGATIVE
HYALINE CASTS URNS QL MICRO: ABNORMAL /LPF (ref 0–5)
KETONES UR QL STRIP.AUTO: NEGATIVE MG/DL
LACTATE SERPL-SCNC: 2.3 MMOL/L (ref 0.4–2)
LEUKOCYTE ESTERASE UR QL STRIP.AUTO: ABNORMAL
LYMPHOCYTES # BLD: 1.5 K/UL (ref 0.8–3.5)
LYMPHOCYTES NFR BLD: 35 % (ref 12–49)
MCH RBC QN AUTO: 32.3 PG (ref 26–34)
MCHC RBC AUTO-ENTMCNC: 33.2 G/DL (ref 30–36.5)
MCV RBC AUTO: 97.5 FL (ref 80–99)
MONOCYTES # BLD: 0.4 K/UL (ref 0–1)
MONOCYTES NFR BLD: 8 % (ref 5–13)
NEUTS SEG # BLD: 2.4 K/UL (ref 1.8–8)
NEUTS SEG NFR BLD: 55 % (ref 32–75)
NITRITE UR QL STRIP.AUTO: POSITIVE
PH UR STRIP: 6 [PH] (ref 5–8)
PLATELET # BLD AUTO: 163 K/UL (ref 150–400)
POTASSIUM SERPL-SCNC: 3.3 MMOL/L (ref 3.5–5.1)
PROT SERPL-MCNC: 6.6 G/DL (ref 6.4–8.2)
PROT UR STRIP-MCNC: NEGATIVE MG/DL
RBC # BLD AUTO: 3.93 M/UL (ref 3.8–5.2)
RBC #/AREA URNS HPF: ABNORMAL /HPF (ref 0–5)
SERVICE CMNT-IMP: ABNORMAL
SERVICE CMNT-IMP: NORMAL
SODIUM SERPL-SCNC: 142 MMOL/L (ref 136–145)
SP GR UR REFRACTOMETRY: 1.01 (ref 1–1.03)
UROBILINOGEN UR QL STRIP.AUTO: 0.2 EU/DL (ref 0.2–1)
WBC # BLD AUTO: 4.4 K/UL (ref 3.6–11)
WBC URNS QL MICRO: ABNORMAL /HPF (ref 0–4)

## 2017-11-16 PROCEDURE — 81001 URINALYSIS AUTO W/SCOPE: CPT | Performed by: EMERGENCY MEDICINE

## 2017-11-16 PROCEDURE — 84703 CHORIONIC GONADOTROPIN ASSAY: CPT | Performed by: EMERGENCY MEDICINE

## 2017-11-16 PROCEDURE — 74011000250 HC RX REV CODE- 250: Performed by: INTERNAL MEDICINE

## 2017-11-16 PROCEDURE — 74011636320 HC RX REV CODE- 636/320: Performed by: INTERNAL MEDICINE

## 2017-11-16 PROCEDURE — 99285 EMERGENCY DEPT VISIT HI MDM: CPT

## 2017-11-16 PROCEDURE — 80053 COMPREHEN METABOLIC PANEL: CPT | Performed by: EMERGENCY MEDICINE

## 2017-11-16 PROCEDURE — 74011250636 HC RX REV CODE- 250/636: Performed by: INTERNAL MEDICINE

## 2017-11-16 PROCEDURE — 74011250636 HC RX REV CODE- 250/636: Performed by: EMERGENCY MEDICINE

## 2017-11-16 PROCEDURE — 87040 BLOOD CULTURE FOR BACTERIA: CPT | Performed by: EMERGENCY MEDICINE

## 2017-11-16 PROCEDURE — 93005 ELECTROCARDIOGRAM TRACING: CPT

## 2017-11-16 PROCEDURE — 96365 THER/PROPH/DIAG IV INF INIT: CPT

## 2017-11-16 PROCEDURE — 71010 XR CHEST PORT: CPT

## 2017-11-16 PROCEDURE — 87086 URINE CULTURE/COLONY COUNT: CPT | Performed by: EMERGENCY MEDICINE

## 2017-11-16 PROCEDURE — 74011000258 HC RX REV CODE- 258: Performed by: INTERNAL MEDICINE

## 2017-11-16 PROCEDURE — 96374 THER/PROPH/DIAG INJ IV PUSH: CPT

## 2017-11-16 PROCEDURE — 87186 SC STD MICRODIL/AGAR DIL: CPT | Performed by: EMERGENCY MEDICINE

## 2017-11-16 PROCEDURE — 96361 HYDRATE IV INFUSION ADD-ON: CPT

## 2017-11-16 PROCEDURE — 96375 TX/PRO/DX INJ NEW DRUG ADDON: CPT

## 2017-11-16 PROCEDURE — 87077 CULTURE AEROBIC IDENTIFY: CPT | Performed by: EMERGENCY MEDICINE

## 2017-11-16 PROCEDURE — 74177 CT ABD & PELVIS W/CONTRAST: CPT

## 2017-11-16 PROCEDURE — 74011000250 HC RX REV CODE- 250: Performed by: EMERGENCY MEDICINE

## 2017-11-16 PROCEDURE — 85025 COMPLETE CBC W/AUTO DIFF WBC: CPT | Performed by: EMERGENCY MEDICINE

## 2017-11-16 PROCEDURE — 96366 THER/PROPH/DIAG IV INF ADDON: CPT

## 2017-11-16 PROCEDURE — 83605 ASSAY OF LACTIC ACID: CPT | Performed by: EMERGENCY MEDICINE

## 2017-11-16 PROCEDURE — 82962 GLUCOSE BLOOD TEST: CPT

## 2017-11-16 PROCEDURE — 65270000029 HC RM PRIVATE

## 2017-11-16 PROCEDURE — 74011000258 HC RX REV CODE- 258: Performed by: EMERGENCY MEDICINE

## 2017-11-16 PROCEDURE — 36415 COLL VENOUS BLD VENIPUNCTURE: CPT | Performed by: EMERGENCY MEDICINE

## 2017-11-16 RX ORDER — TRAZODONE HYDROCHLORIDE 50 MG/1
100 TABLET ORAL
Status: DISCONTINUED | OUTPATIENT
Start: 2017-11-16 | End: 2017-11-22 | Stop reason: HOSPADM

## 2017-11-16 RX ORDER — CEFIXIME 400 MG/1
400 CAPSULE ORAL DAILY
COMMUNITY
End: 2017-11-22

## 2017-11-16 RX ORDER — SODIUM CHLORIDE 0.9 % (FLUSH) 0.9 %
5-10 SYRINGE (ML) INJECTION AS NEEDED
Status: DISCONTINUED | OUTPATIENT
Start: 2017-11-16 | End: 2017-11-22 | Stop reason: HOSPADM

## 2017-11-16 RX ORDER — DEXTROSE MONOHYDRATE AND SODIUM CHLORIDE 5; .9 G/100ML; G/100ML
100 INJECTION, SOLUTION INTRAVENOUS CONTINUOUS
Status: DISCONTINUED | OUTPATIENT
Start: 2017-11-16 | End: 2017-11-18

## 2017-11-16 RX ORDER — NEBIVOLOL 2.5 MG/1
5 TABLET ORAL DAILY
Status: DISCONTINUED | OUTPATIENT
Start: 2017-11-17 | End: 2017-11-22 | Stop reason: HOSPADM

## 2017-11-16 RX ORDER — TRAZODONE HYDROCHLORIDE 100 MG/1
100 TABLET ORAL
COMMUNITY
End: 2019-03-20

## 2017-11-16 RX ORDER — FLUOXETINE HYDROCHLORIDE 40 MG/1
40 CAPSULE ORAL DAILY
COMMUNITY

## 2017-11-16 RX ORDER — SODIUM CHLORIDE 0.9 % (FLUSH) 0.9 %
10 SYRINGE (ML) INJECTION
Status: COMPLETED | OUTPATIENT
Start: 2017-11-16 | End: 2017-11-16

## 2017-11-16 RX ORDER — ONDANSETRON 2 MG/ML
4 INJECTION INTRAMUSCULAR; INTRAVENOUS
Status: DISCONTINUED | OUTPATIENT
Start: 2017-11-16 | End: 2017-11-22 | Stop reason: HOSPADM

## 2017-11-16 RX ORDER — SODIUM CHLORIDE 9 MG/ML
50 INJECTION, SOLUTION INTRAVENOUS
Status: COMPLETED | OUTPATIENT
Start: 2017-11-16 | End: 2017-11-16

## 2017-11-16 RX ORDER — DEXTROSE 50 % IN WATER (D50W) INTRAVENOUS SYRINGE
50
Status: COMPLETED | OUTPATIENT
Start: 2017-11-16 | End: 2017-11-16

## 2017-11-16 RX ORDER — LEVOTHYROXINE SODIUM 50 UG/1
50 TABLET ORAL
Status: DISCONTINUED | OUTPATIENT
Start: 2017-11-17 | End: 2017-11-22 | Stop reason: HOSPADM

## 2017-11-16 RX ORDER — HYDROXYZINE 25 MG/1
25 TABLET, FILM COATED ORAL
Status: DISCONTINUED | OUTPATIENT
Start: 2017-11-16 | End: 2017-11-22 | Stop reason: HOSPADM

## 2017-11-16 RX ORDER — SULFAMETHOXAZOLE AND TRIMETHOPRIM 800; 160 MG/1; MG/1
1 TABLET ORAL 2 TIMES DAILY
COMMUNITY
End: 2017-11-22

## 2017-11-16 RX ORDER — FLUOXETINE HYDROCHLORIDE 20 MG/1
40 CAPSULE ORAL DAILY
Status: DISCONTINUED | OUTPATIENT
Start: 2017-11-17 | End: 2017-11-22 | Stop reason: HOSPADM

## 2017-11-16 RX ORDER — ACETAMINOPHEN 325 MG/1
650 TABLET ORAL
Status: DISCONTINUED | OUTPATIENT
Start: 2017-11-16 | End: 2017-11-22 | Stop reason: HOSPADM

## 2017-11-16 RX ORDER — HYDROXYZINE 25 MG/1
25 TABLET, FILM COATED ORAL
COMMUNITY
End: 2018-01-29

## 2017-11-16 RX ORDER — MORPHINE SULFATE 2 MG/ML
4 INJECTION, SOLUTION INTRAMUSCULAR; INTRAVENOUS ONCE
Status: COMPLETED | OUTPATIENT
Start: 2017-11-16 | End: 2017-11-16

## 2017-11-16 RX ORDER — SODIUM CHLORIDE 0.9 % (FLUSH) 0.9 %
5-10 SYRINGE (ML) INJECTION EVERY 8 HOURS
Status: DISCONTINUED | OUTPATIENT
Start: 2017-11-16 | End: 2017-11-22 | Stop reason: HOSPADM

## 2017-11-16 RX ORDER — ENOXAPARIN SODIUM 100 MG/ML
40 INJECTION SUBCUTANEOUS EVERY 24 HOURS
Status: DISCONTINUED | OUTPATIENT
Start: 2017-11-16 | End: 2017-11-22 | Stop reason: HOSPADM

## 2017-11-16 RX ORDER — NALTREXONE HYDROCHLORIDE 50 MG/1
50 TABLET, FILM COATED ORAL
COMMUNITY
End: 2018-01-29

## 2017-11-16 RX ADMIN — DEXTROSE MONOHYDRATE 25 G: 25 INJECTION, SOLUTION INTRAVENOUS at 18:18

## 2017-11-16 RX ADMIN — SODIUM CHLORIDE 2550 ML: 900 INJECTION, SOLUTION INTRAVENOUS at 18:18

## 2017-11-16 RX ADMIN — FAMOTIDINE 20 MG: 10 INJECTION, SOLUTION INTRAVENOUS at 23:05

## 2017-11-16 RX ADMIN — SODIUM CHLORIDE 50 ML/HR: 900 INJECTION, SOLUTION INTRAVENOUS at 21:55

## 2017-11-16 RX ADMIN — IOPAMIDOL 100 ML: 755 INJECTION, SOLUTION INTRAVENOUS at 21:55

## 2017-11-16 RX ADMIN — MORPHINE SULFATE 4 MG: 2 INJECTION, SOLUTION INTRAMUSCULAR; INTRAVENOUS at 20:46

## 2017-11-16 RX ADMIN — Medication 10 ML: at 23:49

## 2017-11-16 RX ADMIN — Medication 10 ML: at 21:56

## 2017-11-16 RX ADMIN — PIPERACILLIN SODIUM,TAZOBACTAM SODIUM 3.38 G: 3; .375 INJECTION, POWDER, FOR SOLUTION INTRAVENOUS at 19:52

## 2017-11-16 RX ADMIN — ENOXAPARIN SODIUM 40 MG: 40 INJECTION SUBCUTANEOUS at 23:05

## 2017-11-16 RX ADMIN — DEXTROSE MONOHYDRATE AND SODIUM CHLORIDE 100 ML/HR: 5; .9 INJECTION, SOLUTION INTRAVENOUS at 23:43

## 2017-11-16 NOTE — IP AVS SNAPSHOT
[address] [facility] 
[phone] Patient: Mirtha Dukes MRN: [id number] :1970 About your hospitalization You were admitted on:  2017 You last received care in the:  [facility] You were discharged on:  2017 Why you were hospitalized Your primary diagnosis was:  Not on File Your diagnoses also included:  Uti (Urinary Tract Infection), Pyelonephritis, Severe Sepsis (Hcc), Anxiety Things You Need To Do (next 8 weeks) Schedule an appointment with Rufino Arzate MD as soon as possible for a visit in 1 week(s) Phone:  [phone] Where:  [address] [place] [address], [address] [address] Discharge Orders None A check janeen indicates which time of day the medication should be taken. My Medications STOP taking these medications BACTRIM -800 mg per tablet Generic drug:  trimethoprim-sulfamethoxazole COPPER PO  
   
  
 SUPRAX 400 mg capsule Generic drug:  cefixime TAKE these medications as instructed Instructions Each Dose to Equal  
 Morning Noon Evening Bedtime BC HEADACHE POWDER PO Your last dose was: Your next dose is: Take 1 Packet by mouth daily as needed (headache). 1 Packet BYSTOLIC 5 mg tablet Generic drug:  nebivolol Your last dose was: Your next dose is: Take 5 mg by mouth daily. 5 mg FLUoxetine 40 mg capsule Commonly known as:  PROzac Your last dose was: Your next dose is: Take 40 mg by mouth daily. 40 mg  
    
   
   
   
  
 hydrOXYzine HCl 25 mg tablet Commonly known as:  ATARAX Your last dose was: Your next dose is: Take 25 mg by mouth daily as needed for Anxiety.   
 25 mg  
    
   
   
   
  
 LASIX 20 mg tablet Generic drug:  furosemide Your last dose was: Your next dose is: Take 20 mg by mouth daily as needed (fluid retention). 20 mg  
    
   
   
   
  
 meropenem 500 mg, ADDaptor 1 Device IVPB Your last dose was: Your next dose is:    
   
   
 500 mg by IntraVENous route every eight (8) hours for 5 days. 500 mg  
    
   
   
   
  
 naltrexone 50 mg tablet Commonly known as:  DEPADE Your last dose was: Your next dose is: Take 50 mg by mouth daily as needed. Indications: Alcoholism 50 mg  
    
   
   
   
  
 oxyCODONE-acetaminophen 5-325 mg per tablet Commonly known as:  PERCOCET Your last dose was: Your next dose is: Take 1 Tab by mouth every six (6) hours as needed for Pain. Max Daily Amount: 4 Tabs. 1 Tab  
    
   
   
   
  
 synthroid 50 mcg tablet Generic drug:  levothyroxine Your last dose was: Your next dose is: Take 50 mcg by mouth Daily (before breakfast). 50 mcg  
    
   
   
   
  
 traZODone 100 mg tablet Commonly known as:  Ethan [place] Your last dose was: Your next dose is: Take 100 mg by mouth nightly as needed for Sleep. 100 mg  
    
   
   
   
  
 VITAMIN D2 50,000 unit capsule Generic drug:  ergocalciferol Your last dose was: Your next dose is: Take 50,000 Units by mouth Every Thursday. [address] Units Where to Get Your Medications Information on where to get these meds will be given to you by the nurse or doctor. ! Ask your nurse or doctor about these medications  
  meropenem 500 mg, ADDaptor 1 Device IVPB  
 oxyCODONE-acetaminophen 5-325 mg per tablet Discharge Instructions None MyChart Announcement  We are excited to announce that we are making your provider's discharge notes available to you in [employer]. You will see these notes when they are completed and signed by the physician that discharged you from your recent hospital stay. If you have any questions or concerns about any information you see in [employer], please call the Health Information Department where you were seen or reach out to your Primary Care Provider for more information about your plan of care. Introducing [facility]! Heather Haque introduces [employer] patient portal. Now you can access parts of your medical record, email your doctor's office, and request medication refills online. 1. In your internet browser, go to [email or web address] 2. Click on the First Time User? Click Here link in the Sign In box. You will see the New Member Sign Up page. 3. Enter your [facility] Access Code exactly as it appears below. You will not need to use this code after youve completed the sign-up process. If you do not sign up before the expiration date, you must request a new code. · [employer] Access Code: [email or web address] Expires: 12/16/2017  8:47 AM 
 
4. Enter the last four digits of your Social Security Number (xxxx) and Date of Birth (mm/dd/yyyy) as indicated and click Submit. You will be taken to the next sign-up page. 5. Create a [facility] ID. This will be your [facility] login ID and cannot be changed, so think of one that is secure and easy to remember. 6. Create a [employer] password. You can change your password at any time. 7. Enter your Password Reset Question and Answer. This can be used at a later time if you forget your password. 8. Enter your e-mail address. You will receive e-mail notification when new information is available in [address] 9. Click Sign Up. You can now view and download portions of your medical record. 10. Click the Download Summary menu link to download a portable copy of your medical information. If you have questions, please visit the Frequently Asked Questions section of the MyChart website. Remember, [employer] is NOT to be used for urgent needs. For medical emergencies, dial 911. Now available from your iPhone and Android! Providers Seen During Your Hospitalization Provider Specialty Primary office phone Medardo Leal DO Emergency Medicine [phone] Mario Kaur MD Internal Medicine MD Emily Hospitalist [phone] Your Primary Care Physician (PCP) Primary Care Physician Office Phone Office Fax Zachary Holley [phone] You are allergic to the following No active allergies Recent Documentation Height Weight Breastfeeding? BMI OB Status Smoking Status 1.63 m 86.9 kg No 32.71 kg/m2 Hysterectomy Never Smoker Emergency Contacts Name Discharge Info Relation Home Work Mobile Barber Dawson  Spouse [3]   [phone] Patient Belongings The following personal items are in your possession at time of discharge: 
  Dental Appliances: None         Home Medications: Kept at bedside      Clothing: At bedside    Other Valuables: Cell Phone Please provide this summary of care documentation to your next provider. Signatures-by signing, you are acknowledging that this After Visit Summary has been reviewed with you and you have received a copy. Patient Signature:  ____________________________________________________________ Date:  ____________________________________________________________  
  
Priscila Sleight Provider Signature:  ____________________________________________________________ Date:  ____________________________________________________________

## 2017-11-16 NOTE — ED PROVIDER NOTES
[facility]  EMERGENCY DEPARTMENT HISTORY AND PHYSICAL EXAM         Date of Service: 11/16/2017   Patient Name: Helena Long   YOB: 1970  Medical Record Number: [id number]    History of Presenting Illness     Chief Complaint   Patient presents with    Abdominal Pain     \"I thought it was a UTI, I went to my doctor on thursday. She put me on bactrim. After got back the urine culture that it came back they arent finding any specific bacteria. they think it is my kidneys\"        History Provided By:  patient    Additional History:   Helena Long is a 55 y.o. female with PMhx significant for Hypothyroidism, Sarcoidosis and Bladder prolapse who presents ambulatory to the ED with cc of intermittent hematuria since 11/10 that worsened with onset of lower back and abd pain with nausea and vomiting today. She reports associated sx's of dizziness and generalized body aches. Pt reports on onset of sx's she was seen by her PCP on 11/9 who prescribed her Bactrim for a UTI. However she states that she's had no relief after finishing this course of abx. She reports being revaluated on 11/12 but has continued to have no relief. She reports that her BG has been fluctuating down to the mid 40s, noting she is unsure if this is related. She reports PSHx of L5-S1 Laminectomy. She denies any known allergy to IV dye. She denies any sx's of fever. Social Hx: - Tobacco, - EtOH, - Illicit Drugs    There are no other complaints, changes or physical findings at this time.     Primary Care Provider: Gabriela Kenny MD       Past History     Past Medical History:   Past Medical History:   Diagnosis Date    Anemia     Anxiety     Arthritis     osteoarthritis    Autoimmune disease ([address]     Bowel trouble     Burning with urination     Chronic pain     Depression     Dyspepsia and other specified disorders of function of stomach     Headache     Hypothyroid     Osteoporosis         Past Surgical History:   Past Surgical History:   Procedure Laterality Date    HX BLADDER SUSPENSION      HX CATARACT REMOVAL  12/14    HX GASTRIC BYPASS  8/1999    HX HERNIA REPAIR      HX HYSTERECTOMY  2007    NEUROLOGICAL PROCEDURE UNLISTED  2003, 2004    back surgery         Family History:   Family History   Problem Relation Age of Onset    Hypertension Father     Diabetes Father     Diabetes Paternal Aunt     Heart Disease Maternal Grandmother     Stroke Maternal Grandmother         Social History:   Social History   Substance Use Topics    Smoking status: Never Smoker    Smokeless tobacco: Not on file    Alcohol use No        Allergies:   No Known Allergies     Review of Systems   Review of Systems   Constitutional: Negative for chills and fever. HENT: Negative for dental problem and sore throat. Eyes: Negative for visual disturbance. Respiratory: Negative for cough and shortness of breath. Cardiovascular: Negative for chest pain and leg swelling. Gastrointestinal: Positive for abdominal pain, nausea and vomiting. Negative for blood in stool and diarrhea. Endocrine: Negative for polyuria. Genitourinary: Positive for hematuria. Negative for dysuria, flank pain, vaginal bleeding and vaginal discharge. Musculoskeletal: Positive for back pain. Negative for myalgias.        +generalized body aches   Skin: Negative for rash. Allergic/Immunologic: Negative for immunocompromised state. Neurological: Positive for dizziness. Negative for weakness and headaches. Psychiatric/Behavioral: Negative for confusion. All other systems reviewed and are negative. Physical Exam  Physical Exam   Constitutional: She is oriented to person, place, and time. She appears well-developed and well-nourished. HENT:   Head: Normocephalic and atraumatic. Dry oral mucosa    Eyes: Conjunctivae are normal. Pupils are equal, round, and reactive to light.  Right eye exhibits no discharge. Left eye exhibits no discharge. Neck: Normal range of motion. Neck supple. No tracheal deviation present. Cardiovascular: Regular rhythm and normal heart sounds. Tachycardia present. No murmur heard. Pulmonary/Chest: Effort normal and breath sounds normal. No respiratory distress. She has no wheezes. She has no rales. Abdominal: Soft. Bowel sounds are normal. There is no tenderness. There is no rebound, no guarding and no CVA tenderness. Musculoskeletal: Normal range of motion. She exhibits no edema, tenderness or deformity. Midline incisional scar to lower lumbar spine     Neurological: She is alert and oriented to person, place, and time. Skin: Skin is warm and dry. No rash noted. No erythema. Psychiatric: Her behavior is normal.   Nursing note and vitals reviewed. Medical Decision Making   I am the first provider for this patient. I reviewed the vital signs, available nursing notes, past medical history, past surgical history, family history and social history. Old Medical Records: Old medical records. Nursing notes. Provider Notes:   DDx: UTI, viral syndrome, pyelonephritis, doubt appendicitis and diverticulitis. No concern for epidural abscess based on hx. ED Course:  5:25 PM   Initial assessment performed. The patients presenting problems have been discussed, and they are in agreement with the care plan formulated and outlined with them. I have encouraged them to ask questions as they arise throughout their visit. Progress Notes:   5:29 PM - I suspect that this patient has an active infection. 5:29 PM - The patient met criteria for severe sepsis at this time.       PROVIDER SEPSIS PHYSICAL EXAM EVAL  Vital signs reviewed (see nursing documentation for further details):  Vitals:    11/16/17 1930 11/16/17 1945 11/16/17 2000 11/16/17 2015   BP: 127/67 121/73 129/69 120/68   Pulse: 88 89 89 99   Resp: 28 17 27 20   Temp:       SpO2: 95% 98% 97%        Cardiac exam:Regular Rate    Pulmonary exam:Normal and clear lungs    Peripheral pulses:Normal    Capillary refill:Normal    Skin exam:pink    Exam performed Bob Stearns MD    7:32 PM  Chart review shows that urine culture grew out esbl ecoli which is likely why Bactrim gave no relief. CONSULT NOTE:   7:35 PM  Virgen Mcallister DO spoke with Dr. Jean Cotton,   Specialty: Hospitalist  Discussed pt's hx, disposition, and available diagnostic and imaging results. Reviewed care plans. Consultant will evaluate pt for admission. He recommends calling pharmacy to see if there is any outpatient management  Written by ALEJO Redman, as dictated by Virgen Mcallister DO  .  7:40 PM  Spoke with pharmacy who states there are no outpatient treatment options. Diagnostic Study Results   Labs -      Recent Results (from the past 12 hour(s))   GLUCOSE, POC    Collection Time: 11/16/17  5:16 PM   Result Value Ref Range    Glucose (POC) 58 (L) 65 - 100 mg/dL    Performed by Augustus Duenas    LACTIC ACID    Collection Time: 11/16/17  5:44 PM   Result Value Ref Range    Lactic acid 2.3 (HH) 0.4 - 2.0 MMOL/L   METABOLIC PANEL, COMPREHENSIVE    Collection Time: 11/16/17  5:44 PM   Result Value Ref Range    Sodium 142 136 - 145 mmol/L    Potassium 3.3 (L) 3.5 - 5.1 mmol/L    Chloride 109 (H) 97 - 108 mmol/L    CO2 22 21 - 32 mmol/L    Anion gap 11 5 - 15 mmol/L    Glucose 66 65 - 100 mg/dL    BUN 6 6 - 20 MG/DL    Creatinine 0.62 0.55 - 1.02 MG/DL    BUN/Creatinine ratio 10 (L) 12 - 20      GFR est AA >60 >60 ml/min/1.73m2    GFR est non-AA >60 >60 ml/min/1.73m2    Calcium 7.8 (L) 8.5 - 10.1 MG/DL    Bilirubin, total 0.2 0.2 - 1.0 MG/DL    ALT (SGPT) 47 12 - 78 U/L    AST (SGOT) 41 (H) 15 - 37 U/L    Alk.  phosphatase 71 45 - 117 U/L    Protein, total 6.6 6.4 - 8.2 g/dL    Albumin 3.5 3.5 - 5.0 g/dL    Globulin 3.1 2.0 - 4.0 g/dL    A-G Ratio 1.1 1.1 - 2.2     CBC WITH AUTOMATED DIFF    Collection Time: 11/16/17  5:44 PM   Result Value Ref Range    WBC 4.4 3.6 - 11.0 K/uL    RBC 3.93 3.80 - 5.20 M/uL    HGB 12.7 11.5 - 16.0 g/dL    HCT 38.3 35.0 - 47.0 %    MCV 97.5 80.0 - 99.0 FL    MCH 32.3 26.0 - 34.0 PG    MCHC 33.2 30.0 - 36.5 g/dL    RDW 13.1 11.5 - 14.5 %    PLATELET 980 986 - 846 K/uL    NEUTROPHILS 55 32 - 75 %    LYMPHOCYTES 35 12 - 49 %    MONOCYTES 8 5 - 13 %    EOSINOPHILS 2 0 - 7 %    BASOPHILS 0 0 - 1 %    ABS. NEUTROPHILS 2.4 1.8 - 8.0 K/UL    ABS. LYMPHOCYTES 1.5 0.8 - 3.5 K/UL    ABS. MONOCYTES 0.4 0.0 - 1.0 K/UL    ABS. EOSINOPHILS 0.1 0.0 - 0.4 K/UL    ABS. BASOPHILS 0.0 0.0 - 0.1 K/UL   HCG QL SERUM    Collection Time: 11/16/17  5:44 PM   Result Value Ref Range    HCG, Ql. NEGATIVE  NEG     URINALYSIS W/ RFLX MICROSCOPIC    Collection Time: 11/16/17  5:51 PM   Result Value Ref Range    Color YELLOW/STRAW      Appearance CLEAR CLEAR      Specific gravity 1.013 1.003 - 1.030      pH (UA) 6.0 5.0 - 8.0      Protein NEGATIVE  NEG mg/dL    Glucose NEGATIVE  NEG mg/dL    Ketone NEGATIVE  NEG mg/dL    Bilirubin NEGATIVE  NEG      Blood NEGATIVE  NEG      Urobilinogen 0.2 0.2 - 1.0 EU/dL    Nitrites POSITIVE (A) NEG      Leukocyte Esterase TRACE (A) NEG      WBC 5-10 0 - 4 /hpf    RBC 0-5 0 - 5 /hpf    Epithelial cells FEW FEW /lpf    Bacteria 4+ (A) NEG /hpf    Hyaline cast 0-2 0 - 5 /lpf   GLUCOSE, POC    Collection Time: 11/16/17  6:49 PM   Result Value Ref Range    Glucose (POC) 102 (H) 65 - 100 mg/dL    Performed by Weston Garcia    GLUCOSE, POC    Collection Time: 11/16/17  7:46 PM   Result Value Ref Range    Glucose (POC) 78 65 - 100 mg/dL    Performed by Weston Garcia    GLUCOSE, POC    Collection Time: 11/16/17  8:50 PM   Result Value Ref Range    Glucose (POC) 108 (H) 65 - 100 mg/dL    Performed by Weston Garcia        Radiologic Studies -  The following have been ordered and reviewed:  CXR Results  (Last 48 hours)               11/16/17 1735  XR CHEST PORT Final result    Impression:  IMPRESSION:   No acute process. Narrative:  INDICATION:   Sepsis       EXAM:  AP CHEST RADIOGRAPH       COMPARISON: November 22, 2016       FINDINGS:       AP portable view of the chest demonstrates a normal cardiomediastinal   silhouette. The lungs are adequately expanded. There is no edema, effusion,   consolidation, or pneumothorax. The osseous structures are unremarkable. Vital Signs-Reviewed the patient's vital signs. Patient Vitals for the past 12 hrs:   Temp Pulse Resp BP SpO2   11/16/17 2015 - 99 20 120/68 -   11/16/17 2000 - 89 27 129/69 97 %   11/16/17 1945 - 89 17 121/73 98 %   11/16/17 1930 - 88 28 127/67 95 %   11/16/17 1915 - 89 22 109/75 96 %   11/16/17 1900 - 91 25 109/56 96 %   11/16/17 1845 - (!) 105 23 122/66 96 %   11/16/17 1830 - 92 27 118/61 95 %   11/16/17 1819 - (!) 103 17 108/70 96 %   11/16/17 1810 - (!) 101 22 - 96 %   11/16/17 1706 98.4 °F (36.9 °C) (!) 135 20 118/69 95 %       Medications Given in the ED:  Medications   sodium chloride (NS) flush 5-10 mL (not administered)   sodium chloride 0.9 % bolus infusion 2,550 mL (2,550 mL IntraVENous New Bag 11/16/17 1818)   dextrose (D50W) injection syrg 25 g (25 g IntraVENous Given 11/16/17 1818)   piperacillin-tazobactam (ZOSYN) 3.375 g in 0.9% sodium chloride (MBP/ADV) 100 mL (3.375 g IntraVENous New Bag 11/16/17 1952)   morphine injection 4 mg (4 mg IntraVENous Given 11/16/17 2046)     EKG interpretation: (Preliminary) 1739  Rhythm: sinus tachycardia; and regular . Rate (approx.): 101; Axis: normal; P wave: normal; QRS interval: 62; ST/T wave: normal; QT/QTc: 348/451 Other findings: no ischemic change. Diagnosis:  Clinical Impression:   1. Urinary tract infection without hematuria, site unspecified    2. ESBL (extended spectrum beta-lactamase) producing bacteria infection    3. Lactic acidosis    4.  Hypoglycemia         Plan:  1: Admit to hospital    Disposition:  Admit Note:  8:24 PM  Patient is being admitted to the hospital. The results of their tests and reasons for their admission have been discussed with the patient and/or available family. They convey their agreement and understanding for the need to be admitted and for their admission diagnosis. Written by ALEJO Stubbs, as dictated by Lizette Nelson DO.  _______________________________   Attestations: This is note is prepared by Shelton Olivarez, acting as Scribe for DO Lizette Willson DO The scribe's documentation has been prepared under my direction and personally reviewed by me in its entirety.  I confirm that the note above accurately reflects all work, treatment, procedures, and medical decision making performed by me.   _______________________________

## 2017-11-17 LAB
ANION GAP SERPL CALC-SCNC: 7 MMOL/L (ref 5–15)
ATRIAL RATE: 101 BPM
BUN SERPL-MCNC: 7 MG/DL (ref 6–20)
BUN/CREAT SERPL: 13 (ref 12–20)
CALCIUM SERPL-MCNC: 7.6 MG/DL (ref 8.5–10.1)
CALCULATED P AXIS, ECG09: 60 DEGREES
CALCULATED R AXIS, ECG10: 32 DEGREES
CALCULATED T AXIS, ECG11: 49 DEGREES
CHLORIDE SERPL-SCNC: 113 MMOL/L (ref 97–108)
CO2 SERPL-SCNC: 24 MMOL/L (ref 21–32)
CREAT SERPL-MCNC: 0.55 MG/DL (ref 0.55–1.02)
DIAGNOSIS, 93000: NORMAL
ERYTHROCYTE [DISTWIDTH] IN BLOOD BY AUTOMATED COUNT: 13.4 % (ref 11.5–14.5)
GLUCOSE SERPL-MCNC: 72 MG/DL (ref 65–100)
HCT VFR BLD AUTO: 36.4 % (ref 35–47)
HGB BLD-MCNC: 11.4 G/DL (ref 11.5–16)
LACTATE SERPL-SCNC: 0.8 MMOL/L (ref 0.4–2)
MCH RBC QN AUTO: 30.6 PG (ref 26–34)
MCHC RBC AUTO-ENTMCNC: 31.3 G/DL (ref 30–36.5)
MCV RBC AUTO: 97.6 FL (ref 80–99)
P-R INTERVAL, ECG05: 168 MS
PLATELET # BLD AUTO: 138 K/UL (ref 150–400)
POTASSIUM SERPL-SCNC: 4 MMOL/L (ref 3.5–5.1)
Q-T INTERVAL, ECG07: 348 MS
QRS DURATION, ECG06: 62 MS
QTC CALCULATION (BEZET), ECG08: 451 MS
RBC # BLD AUTO: 3.73 M/UL (ref 3.8–5.2)
SODIUM SERPL-SCNC: 144 MMOL/L (ref 136–145)
VENTRICULAR RATE, ECG03: 101 BPM
WBC # BLD AUTO: 3 K/UL (ref 3.6–11)

## 2017-11-17 PROCEDURE — 74011000258 HC RX REV CODE- 258: Performed by: INTERNAL MEDICINE

## 2017-11-17 PROCEDURE — 74011000250 HC RX REV CODE- 250: Performed by: INTERNAL MEDICINE

## 2017-11-17 PROCEDURE — 74011250636 HC RX REV CODE- 250/636: Performed by: INTERNAL MEDICINE

## 2017-11-17 PROCEDURE — 85027 COMPLETE CBC AUTOMATED: CPT | Performed by: INTERNAL MEDICINE

## 2017-11-17 PROCEDURE — 36415 COLL VENOUS BLD VENIPUNCTURE: CPT | Performed by: INTERNAL MEDICINE

## 2017-11-17 PROCEDURE — 80048 BASIC METABOLIC PNL TOTAL CA: CPT | Performed by: INTERNAL MEDICINE

## 2017-11-17 PROCEDURE — 74011250637 HC RX REV CODE- 250/637: Performed by: INTERNAL MEDICINE

## 2017-11-17 PROCEDURE — 65270000029 HC RM PRIVATE

## 2017-11-17 RX ORDER — MORPHINE SULFATE 2 MG/ML
1 INJECTION, SOLUTION INTRAMUSCULAR; INTRAVENOUS
Status: DISCONTINUED | OUTPATIENT
Start: 2017-11-17 | End: 2017-11-22 | Stop reason: HOSPADM

## 2017-11-17 RX ORDER — HYDROCODONE BITARTRATE AND ACETAMINOPHEN 5; 325 MG/1; MG/1
1 TABLET ORAL
Status: DISCONTINUED | OUTPATIENT
Start: 2017-11-17 | End: 2017-11-22 | Stop reason: HOSPADM

## 2017-11-17 RX ADMIN — ACETAMINOPHEN 650 MG: 325 TABLET ORAL at 00:05

## 2017-11-17 RX ADMIN — Medication 1 CAPSULE: at 09:18

## 2017-11-17 RX ADMIN — HYDROCODONE BITARTRATE AND ACETAMINOPHEN 1 TABLET: 5; 325 TABLET ORAL at 09:43

## 2017-11-17 RX ADMIN — Medication 10 ML: at 13:54

## 2017-11-17 RX ADMIN — ACETAMINOPHEN 650 MG: 325 TABLET ORAL at 09:18

## 2017-11-17 RX ADMIN — PIPERACILLIN SODIUM,TAZOBACTAM SODIUM 3.38 G: 3; .375 INJECTION, POWDER, FOR SOLUTION INTRAVENOUS at 17:21

## 2017-11-17 RX ADMIN — Medication 10 ML: at 05:38

## 2017-11-17 RX ADMIN — LEVOTHYROXINE SODIUM 50 MCG: 50 TABLET ORAL at 05:37

## 2017-11-17 RX ADMIN — ENOXAPARIN SODIUM 40 MG: 40 INJECTION SUBCUTANEOUS at 21:59

## 2017-11-17 RX ADMIN — Medication 10 ML: at 21:59

## 2017-11-17 RX ADMIN — MORPHINE SULFATE 1 MG: 2 INJECTION, SOLUTION INTRAMUSCULAR; INTRAVENOUS at 13:54

## 2017-11-17 RX ADMIN — DEXTROSE MONOHYDRATE AND SODIUM CHLORIDE 100 ML/HR: 5; .9 INJECTION, SOLUTION INTRAVENOUS at 22:04

## 2017-11-17 RX ADMIN — HYDROCODONE BITARTRATE AND ACETAMINOPHEN 1 TABLET: 5; 325 TABLET ORAL at 17:21

## 2017-11-17 RX ADMIN — PIPERACILLIN SODIUM,TAZOBACTAM SODIUM 3.38 G: 3; .375 INJECTION, POWDER, FOR SOLUTION INTRAVENOUS at 09:18

## 2017-11-17 RX ADMIN — TRAZODONE HYDROCHLORIDE 100 MG: 50 TABLET ORAL at 22:04

## 2017-11-17 RX ADMIN — FAMOTIDINE 20 MG: 10 INJECTION, SOLUTION INTRAVENOUS at 09:18

## 2017-11-17 RX ADMIN — PIPERACILLIN SODIUM,TAZOBACTAM SODIUM 3.38 G: 3; .375 INJECTION, POWDER, FOR SOLUTION INTRAVENOUS at 02:16

## 2017-11-17 RX ADMIN — TRAZODONE HYDROCHLORIDE 100 MG: 50 TABLET ORAL at 00:05

## 2017-11-17 RX ADMIN — NEBIVOLOL HYDROCHLORIDE 5 MG: 2.5 TABLET ORAL at 09:19

## 2017-11-17 RX ADMIN — FAMOTIDINE 20 MG: 10 INJECTION, SOLUTION INTRAVENOUS at 21:59

## 2017-11-17 RX ADMIN — FLUOXETINE 40 MG: 20 CAPSULE ORAL at 09:19

## 2017-11-17 RX ADMIN — MORPHINE SULFATE 1 MG: 2 INJECTION, SOLUTION INTRAMUSCULAR; INTRAVENOUS at 20:15

## 2017-11-17 NOTE — PROGRESS NOTES
TRANSFER - IN REPORT:    Verbal report received from [facility] RN(name) on Matti Arango  being received from ED(unit) for routine progression of care      Report consisted of patients Situation, Background, Assessment and   Recommendations(SBAR). Information from the following report(s) SBAR, Kardex, ED Summary, Procedure Summary, Intake/Output, MAR, Recent Results and Cardiac Rhythm Sinus Tachy was reviewed with the receiving nurse. Opportunity for questions and clarification was provided. Assessment completed upon patients arrival to unit and care assumed.

## 2017-11-17 NOTE — H&P
Hospitalist Admission Note    NAME: [place]   :  1970   MRN:  [id number]     Date/Time:  2017 9:28 PM    Patient PCP: Jp Morley MD  ________________________________________________________________________    My assessment of this patient's clinical condition and my plan of care is as follows. Assessment / Plan:  Severe sepsis POA  Due to UTI (partially treated) POA  R/o Pyelonephritis with worsening symptoms despite PO Abx x 1 week    Admit to telemetry floor  IV zosyn for now , follow urine Cx from this admission  Follow blood cx  S/p IVF bolus x 1 in ER, cont IVF at 100 ml/hr  Serial lactate  Megha Q    Cystocele/bladder prolapse POA  Urology OP follow up for repair to prevent recurrent UTIs    Hypertension  Hypothyroidism  Anxiety disorder    Cont home meds        Code Status: Full  Surrogate Decision Maker: Brent Boo # [phone]    DVT Prophylaxis: Sq lovenox  GI Prophylaxis: not indicated    Baseline: pt is independent at home with ADLs        Subjective:   CHIEF COMPLAINT: Worsening bilateral flank/abdominal pain x 1 week now    HISTORY OF PRESENT ILLNESS:     Trish Travis is a 55 y.o.  female who presents with above complains from home ambulatory. Pt presents with CC of worsening Abdominal pain/flank pain x 1 week. H/o recently completing almost 7 days of Bactrim for UTI as per PCP  H/o prolapsed Bladder causing recurrent UTIs including ESB +ve Ecoli UTI in 2017. Pt was found to have  Nitrite +ve UA in ER with lactate of 2.3 in ER    We were asked to admit for work up and evaluation of the above problems.      Past Medical History:   Diagnosis Date    Anemia     Anxiety     Arthritis     osteoarthritis    Autoimmune disease ([address]     Bowel trouble     Burning with urination     Chronic pain     Depression     Dyspepsia and other specified disorders of function of stomach     Headache     Hypothyroid     Osteoporosis Past Surgical History:   Procedure Laterality Date    HX BLADDER SUSPENSION      HX CATARACT REMOVAL  12/14    HX GASTRIC BYPASS  8/1999    HX HERNIA REPAIR      HX HYSTERECTOMY  2007    NEUROLOGICAL PROCEDURE UNLISTED  2003, 2004    back surgery        Social History   Substance Use Topics    Smoking status: Never Smoker    Smokeless tobacco: Not on file    Alcohol use No        Family History   Problem Relation Age of Onset    Hypertension Father     Diabetes Father     Diabetes Paternal Aunt     Heart Disease Maternal Grandmother     Stroke Maternal Grandmother      No Known Allergies     Prior to Admission medications    Medication Sig Start Date End Date Taking? Authorizing Provider   traZODone (DESYREL) 100 mg tablet Take 100 mg by mouth nightly as needed for Sleep. Yes Historical Provider   naltrexone (DEPADE) 50 mg tablet Take 50 mg by mouth daily as needed. Indications: Alcoholism   Yes Historical Provider   hydrOXYzine HCl (ATARAX) 25 mg tablet Take 25 mg by mouth daily as needed for Anxiety. Yes Historical Provider   FLUoxetine (PROZAC) 40 mg capsule Take 40 mg by mouth daily. Yes Historical Provider   ASPIRIN/SALICYLAMIDE/CAFFEINE (BC HEADACHE POWDER PO) Take 1 Packet by mouth daily as needed (headache). Yes Historical Provider   COPPER PO Patient stated that this agent is a liquid compound and she takes 1/4 of the bottle by mouth daily to help with absorption of her medications. Yes Historical Provider   cefixime (SUPRAX) 400 mg capsule Take 400 mg by mouth daily. Yes Historical Provider   trimethoprim-sulfamethoxazole (BACTRIM DS) 160-800 mg per tablet Take 1 Tab by mouth two (2) times a day. Yes Historical Provider   nebivolol (BYSTOLIC) 5 mg tablet Take 5 mg by mouth daily. Yes Teja Cunha, MD   levothyroxine (SYNTHROID) 50 mcg tablet Take 50 mcg by mouth Daily (before breakfast).    Yes Historical Provider   ergocalciferol (VITAMIN D2) 50,000 unit capsule Take 50,000 Units by mouth Every Thursday. Yes Historical Provider   furosemide (LASIX) 20 mg tablet Take 20 mg by mouth daily as needed (fluid retention). Yes Historical Provider       REVIEW OF SYSTEMS:           Total of 12 systems reviewed as follows:       POSITIVE= underlined text  Negative = text not underlined  General:  fever, chills, sweats, generalized weakness, weight loss/gain,      loss of appetite   Eyes:    blurred vision, eye pain, loss of vision, double vision  ENT:    rhinorrhea, pharyngitis   Respiratory:   cough, sputum production, SOB, RIZO, wheezing, pleuritic pain   Cardiology:   chest pain, palpitations, orthopnea, PND, edema, syncope   Gastrointestinal:  abdominal pain , N/V, diarrhea, dysphagia, constipation, bleeding   Genitourinary:  frequency, urgency, dysuria, hematuria, incontinence   Muskuloskeletal :  arthralgia, myalgia, back pain  Hematology:  easy bruising, nose or gum bleeding, lymphadenopathy   Dermatological: rash, ulceration, pruritis, color change / jaundice  Endocrine:   hot flashes or polydipsia   Neurological:  headache, dizziness, confusion, focal weakness, paresthesia,     Speech difficulties, memory loss, gait difficulty  Psychological: Feelings of anxiety, depression, agitation    Objective:   VITALS:    Visit Vitals    /68    Pulse 99    Temp 98.4 °F (36.9 °C)    Resp 20    Wt 85 kg (187 lb 6.3 oz)    SpO2 97%    BMI 32.17 kg/m2       PHYSICAL EXAM:    General:    Alert, cooperative, no distress, appears stated age. HEENT: Atraumatic, anicteric sclerae, pink conjunctivae     No oral ulcers, mucosa moist, throat clear, dentition fair  Neck:  Supple, symmetrical,  thyroid: non tender  Lungs:   Clear to auscultation bilaterally. No Wheezing or Rhonchi. No rales. Chest wall:  No tenderness  No Accessory muscle use. Heart:   Regular  rhythm,  No  murmur   No edema  Abdomen:   Soft, flank tenderness +. Not distended.   Bowel sounds normal  Extremities: No cyanosis. No clubbing,      Skin turgor normal, Capillary refill normal, Radial dial pulse 2+  Skin:     Not pale. Not Jaundiced  No rashes   Psych:  Good insight. Not depressed. Not anxious or agitated. Neurologic: EOMs intact. No facial asymmetry. No aphasia or slurred speech. Symmetrical strength, Sensation grossly intact. Alert and oriented X 4.     _______________________________________________________________________  Care Plan discussed with:    Comments   Patient x    Family  x  at bedside   RN x    Care Manager                    Consultant:      _______________________________________________________________________  Expected  Disposition:   Home with Family x   HH/PT/OT/RN    SNF/LTC    HOA    ________________________________________________________________________  TOTAL TIME:  58 Minutes    Critical Care Provided     Minutes non procedure based      Comments    x Reviewed previous records   >50% of visit spent in counseling and coordination of care x Discussion with patient and family and questions answered       ________________________________________________________________________  Signed: Mari Patel MD    Procedures: see electronic medical records for all procedures/Xrays and details which were not copied into this note but were reviewed prior to creation of Plan.     LAB DATA REVIEWED:    Recent Results (from the past 24 hour(s))   GLUCOSE, POC    Collection Time: 11/16/17  5:16 PM   Result Value Ref Range    Glucose (POC) 58 (L) 65 - 100 mg/dL    Performed by Jennifer Baker    LACTIC ACID    Collection Time: 11/16/17  5:44 PM   Result Value Ref Range    Lactic acid 2.3 (HH) 0.4 - 2.0 MMOL/L   METABOLIC PANEL, COMPREHENSIVE    Collection Time: 11/16/17  5:44 PM   Result Value Ref Range    Sodium 142 136 - 145 mmol/L    Potassium 3.3 (L) 3.5 - 5.1 mmol/L    Chloride 109 (H) 97 - 108 mmol/L    CO2 22 21 - 32 mmol/L    Anion gap 11 5 - 15 mmol/L    Glucose 66 65 - 100 mg/dL    BUN 6 6 - 20 MG/DL    Creatinine 0.62 0.55 - 1.02 MG/DL    BUN/Creatinine ratio 10 (L) 12 - 20      GFR est AA >60 >60 ml/min/1.73m2    GFR est non-AA >60 >60 ml/min/1.73m2    Calcium 7.8 (L) 8.5 - 10.1 MG/DL    Bilirubin, total 0.2 0.2 - 1.0 MG/DL    ALT (SGPT) 47 12 - 78 U/L    AST (SGOT) 41 (H) 15 - 37 U/L    Alk. phosphatase 71 45 - 117 U/L    Protein, total 6.6 6.4 - 8.2 g/dL    Albumin 3.5 3.5 - 5.0 g/dL    Globulin 3.1 2.0 - 4.0 g/dL    A-G Ratio 1.1 1.1 - 2.2     CBC WITH AUTOMATED DIFF    Collection Time: 11/16/17  5:44 PM   Result Value Ref Range    WBC 4.4 3.6 - 11.0 K/uL    RBC 3.93 3.80 - 5.20 M/uL    HGB 12.7 11.5 - 16.0 g/dL    HCT 38.3 35.0 - 47.0 %    MCV 97.5 80.0 - 99.0 FL    MCH 32.3 26.0 - 34.0 PG    MCHC 33.2 30.0 - 36.5 g/dL    RDW 13.1 11.5 - 14.5 %    PLATELET 463 437 - 838 K/uL    NEUTROPHILS 55 32 - 75 %    LYMPHOCYTES 35 12 - 49 %    MONOCYTES 8 5 - 13 %    EOSINOPHILS 2 0 - 7 %    BASOPHILS 0 0 - 1 %    ABS. NEUTROPHILS 2.4 1.8 - 8.0 K/UL    ABS. LYMPHOCYTES 1.5 0.8 - 3.5 K/UL    ABS. MONOCYTES 0.4 0.0 - 1.0 K/UL    ABS. EOSINOPHILS 0.1 0.0 - 0.4 K/UL    ABS.  BASOPHILS 0.0 0.0 - 0.1 K/UL   HCG QL SERUM    Collection Time: 11/16/17  5:44 PM   Result Value Ref Range    HCG, Ql. NEGATIVE  NEG     URINALYSIS W/ RFLX MICROSCOPIC    Collection Time: 11/16/17  5:51 PM   Result Value Ref Range    Color YELLOW/STRAW      Appearance CLEAR CLEAR      Specific gravity 1.013 1.003 - 1.030      pH (UA) 6.0 5.0 - 8.0      Protein NEGATIVE  NEG mg/dL    Glucose NEGATIVE  NEG mg/dL    Ketone NEGATIVE  NEG mg/dL    Bilirubin NEGATIVE  NEG      Blood NEGATIVE  NEG      Urobilinogen 0.2 0.2 - 1.0 EU/dL    Nitrites POSITIVE (A) NEG      Leukocyte Esterase TRACE (A) NEG      WBC 5-10 0 - 4 /hpf    RBC 0-5 0 - 5 /hpf    Epithelial cells FEW FEW /lpf    Bacteria 4+ (A) NEG /hpf    Hyaline cast 0-2 0 - 5 /lpf   GLUCOSE, POC    Collection Time: 11/16/17  6:49 PM   Result Value Ref Range    Glucose (POC) 102 (H) 65 - 100 mg/dL    Performed by Desirae Bravo, POC    Collection Time: 11/16/17  7:46 PM   Result Value Ref Range    Glucose (POC) 78 65 - 100 mg/dL    Performed by Desirae Bravo, POC    Collection Time: 11/16/17  8:50 PM   Result Value Ref Range    Glucose (POC) 108 (H) 65 - 100 mg/dL    Performed by Cherry Landry

## 2017-11-17 NOTE — INTERDISCIPLINARY ROUNDS
Bedside interdisciplinary rounds were held today to discuss patient plan of care and outcomes. The following members were present: Physician, Nurse, Clinical Care Leader, Pharmacy, Physical Therapy, and Case Management. Plan:  Continue current care and await culture results.

## 2017-11-17 NOTE — ED NOTES
TRANSFER - OUT REPORT:    Verbal report given to Alejandro Pickering RN on Alva Gray  being transferred to [facility] for routine progression of care       Report consisted of patients Situation, Background, Assessment and   Recommendations(SBAR). Information from the following report(s) SBAR, Kardex, ED Summary, Procedure Summary, MAR and Recent Results was reviewed with the receiving nurse. Lines:   Peripheral IV 11/16/17 Left Antecubital (Active)   Site Assessment Clean, dry, & intact 11/16/2017  5:45 PM   Phlebitis Assessment 0 11/16/2017  5:45 PM   Infiltration Assessment 0 11/16/2017  5:45 PM   Dressing Status Clean, dry, & intact 11/16/2017  5:45 PM   Dressing Type Transparent 11/16/2017  5:45 PM   Hub Color/Line Status Pink;Flushed 11/16/2017  5:45 PM   Action Taken Blood drawn 11/16/2017  5:45 PM       Peripheral IV 11/16/17 Right Wrist (Active)   Site Assessment Clean, dry, & intact 11/16/2017  6:15 PM   Phlebitis Assessment 0 11/16/2017  6:15 PM   Infiltration Assessment 0 11/16/2017  6:15 PM   Dressing Status Clean, dry, & intact 11/16/2017  6:15 PM   Dressing Type Tape 11/16/2017  6:15 PM   Hub Color/Line Status Pink;Flushed 11/16/2017  6:15 PM   Action Taken Blood drawn 11/16/2017  6:15 PM        Opportunity for questions and clarification was provided.       Patient transported with:   [employer]

## 2017-11-17 NOTE — PROGRESS NOTES
Bedside shift change report given to Mahamed Mendenhall RN (oncoming nurse) by Mariela Granados RN (offgoing nurse). Report included the following information SBAR, Kardex, ED Summary, Intake/Output, MAR and Cardiac Rhythm NSR .

## 2017-11-17 NOTE — PROGRESS NOTES
Pharmacy Clarification of Prior to Admission Medication Regimen     The patient was interviewed regarding clarification of the prior to admission medication regimen, and was questioned regarding use of any other inhalers, topical products, over the counter medications, herbal medications, vitamin products or ophthalmic/nasal/otic medication use. Information Obtained From: Patient, Prescription Bottles, RX Query    Pertinent Pharmacy Findings:   Patient stated that she does not take her 'medications as I [patient] should'.  nebivolol (BYSTOLIC) 5 mg tablet: Patient stated that she takes this agent 'when my [patient] heart rate feels high'.  cefixime (SUPRAX) 400 mg capsule: Patient started a 5 day regimen on 11/14/2017. Patient has completed 3 days of therapy as of 11/16/2017.  trimethoprim-sulfamethoxazole (BACTRIM DS) 160-800 mg per tablet: Patient started a 5 day regimen on 11/12/2017. Patient has completed 3 days of therapy as of 11/16/2017.  COPPER PO: Patient stated that this agent is 'compounded' into a liquid form. Patient stated that she takes 1/4th of the bottle each day 'to help absorb my [patient] medications'.  ASPIRIN/SALICYLAMIDE/CAFFEINE (BC HEADACHE POWDER PO): Patient stated that she takes this OTC agent at home, but was unaware of the strength. PTA medication list was corrected to the following:     Prior to Admission Medications   Prescriptions Last Dose Informant Patient Reported? Taking? ASPIRIN/SALICYLAMIDE/CAFFEINE (BC HEADACHE POWDER PO) 11/16/2017 at Unknown time Self Yes Yes   Sig: Take 1 Packet by mouth daily as needed (headache). COPPER PO 11/10/2017 at Unknown time Self Yes Yes   Sig: Patient stated that this agent is a liquid compound and she takes 1/4 of the bottle by mouth daily to help with absorption of her medications. FLUoxetine (PROZAC) 40 mg capsule 11/16/2017 at Unknown time Self Yes Yes   Sig: Take 40 mg by mouth daily.    cefixime (SUPRAX) 400 mg capsule 11/16/2017 at Unknown time Other Yes Yes   Sig: Take 400 mg by mouth daily. ergocalciferol (VITAMIN D2) 50,000 unit capsule 10/26/2017 at Unknown time Self Yes Yes   Sig: Take 50,000 Units by mouth Every Thursday. furosemide (LASIX) 20 mg tablet 10/26/2017 at Unknown time Self Yes Yes   Sig: Take 20 mg by mouth daily as needed (fluid retention). hydrOXYzine HCl (ATARAX) 25 mg tablet 11/9/2017 at Unknown time Self Yes Yes   Sig: Take 25 mg by mouth daily as needed for Anxiety. levothyroxine (SYNTHROID) 50 mcg tablet 11/16/2017 at Unknown time Self Yes Yes   Sig: Take 50 mcg by mouth Daily (before breakfast). naltrexone (DEPADE) 50 mg tablet 10/16/2017 at Unknown time Self Yes Yes   Sig: Take 50 mg by mouth daily as needed. Indications: Alcoholism   nebivolol (BYSTOLIC) 5 mg tablet 14/97/3481 at Unknown time Other Yes Yes   Sig: Take 5 mg by mouth daily. traZODone (DESYREL) 100 mg tablet 11/15/2017 at Unknown time Self Yes Yes   Sig: Take 100 mg by mouth nightly as needed for Sleep.   trimethoprim-sulfamethoxazole (BACTRIM DS) 160-800 mg per tablet 11/15/2017 at Unknown time Other Yes Yes   Sig: Take 1 Tab by mouth two (2) times a day.       Facility-Administered Medications: None          Thank you,  Ana Laura Jones CPhT  Medication History Pharmacy Technician

## 2017-11-17 NOTE — PROGRESS NOTES
Primary Nurse Carson Gray RN and Mina Chamberlain RN performed a dual skin assessment on this patient No impairment noted  Dustin score is 22  Has a few scattered scratch marks on legs and Lower right side/flank.

## 2017-11-17 NOTE — DIABETES MGMT
DTC Progress Note    Recommendations/ Comments: Noted D5% started last night and BG now stable. Will continue to follow as needed. Chart reviewed on Alvester Halima for hypoglycemia. Patient is a 55 y.o. female with no known history of diabetes. A1c:   No results found for: HBA1C, HGBE8, HAE3XRSK    Recent Glucose Results:   Lab Results   Component Value Date/Time    GLU 72 11/17/2017 05:35 AM    GLU 66 11/16/2017 05:44 PM    GLUCPOC 108 (H) 11/16/2017 08:50 PM    GLUCPOC 78 11/16/2017 07:46 PM    GLUCPOC 102 (H) 11/16/2017 06:49 PM        Lab Results   Component Value Date/Time    Creatinine 0.55 11/17/2017 05:35 AM     Estimated Creatinine Clearance: 135.4 mL/min (based on Cr of 0.55). Active Orders   Diet    DIET REGULAR        PO intake: No data found. Current hospital DM medication:   -D5% @ 100mL/hr    Will continue to follow as needed.     Thank you    Melony Orellana, [address] [place] [address], [address]  Office: [phone]

## 2017-11-18 LAB
BACTERIA SPEC CULT: ABNORMAL
CC UR VC: ABNORMAL
ERYTHROCYTE [DISTWIDTH] IN BLOOD BY AUTOMATED COUNT: 13.2 % (ref 11.5–14.5)
HCT VFR BLD AUTO: 36.1 % (ref 35–47)
HGB BLD-MCNC: 11.4 G/DL (ref 11.5–16)
MCH RBC QN AUTO: 31.1 PG (ref 26–34)
MCHC RBC AUTO-ENTMCNC: 31.6 G/DL (ref 30–36.5)
MCV RBC AUTO: 98.4 FL (ref 80–99)
PLATELET # BLD AUTO: 136 K/UL (ref 150–400)
RBC # BLD AUTO: 3.67 M/UL (ref 3.8–5.2)
SERVICE CMNT-IMP: ABNORMAL
WBC # BLD AUTO: 3.2 K/UL (ref 3.6–11)

## 2017-11-18 PROCEDURE — 74011000258 HC RX REV CODE- 258: Performed by: INTERNAL MEDICINE

## 2017-11-18 PROCEDURE — 74011250637 HC RX REV CODE- 250/637: Performed by: INTERNAL MEDICINE

## 2017-11-18 PROCEDURE — 74011000250 HC RX REV CODE- 250: Performed by: INTERNAL MEDICINE

## 2017-11-18 PROCEDURE — 65270000029 HC RM PRIVATE

## 2017-11-18 PROCEDURE — 36415 COLL VENOUS BLD VENIPUNCTURE: CPT | Performed by: INTERNAL MEDICINE

## 2017-11-18 PROCEDURE — 74011250636 HC RX REV CODE- 250/636: Performed by: INTERNAL MEDICINE

## 2017-11-18 PROCEDURE — 85027 COMPLETE CBC AUTOMATED: CPT | Performed by: INTERNAL MEDICINE

## 2017-11-18 RX ORDER — KETOROLAC TROMETHAMINE 30 MG/ML
15 INJECTION, SOLUTION INTRAMUSCULAR; INTRAVENOUS ONCE
Status: COMPLETED | OUTPATIENT
Start: 2017-11-18 | End: 2017-11-18

## 2017-11-18 RX ORDER — CYCLOBENZAPRINE HCL 10 MG
5 TABLET ORAL DAILY PRN
Status: DISCONTINUED | OUTPATIENT
Start: 2017-11-18 | End: 2017-11-22 | Stop reason: HOSPADM

## 2017-11-18 RX ADMIN — Medication 10 ML: at 13:29

## 2017-11-18 RX ADMIN — FAMOTIDINE 20 MG: 10 INJECTION, SOLUTION INTRAVENOUS at 08:37

## 2017-11-18 RX ADMIN — Medication 10 ML: at 21:43

## 2017-11-18 RX ADMIN — MORPHINE SULFATE 1 MG: 2 INJECTION, SOLUTION INTRAMUSCULAR; INTRAVENOUS at 06:38

## 2017-11-18 RX ADMIN — ENOXAPARIN SODIUM 40 MG: 40 INJECTION SUBCUTANEOUS at 21:41

## 2017-11-18 RX ADMIN — CYCLOBENZAPRINE HYDROCHLORIDE 5 MG: 10 TABLET, FILM COATED ORAL at 12:13

## 2017-11-18 RX ADMIN — Medication 10 ML: at 06:38

## 2017-11-18 RX ADMIN — FLUOXETINE 40 MG: 20 CAPSULE ORAL at 08:36

## 2017-11-18 RX ADMIN — ALUMINUM HYDROXIDE AND MAGNESIUM HYDROXIDE 15 ML: 200; 200 SUSPENSION ORAL at 11:23

## 2017-11-18 RX ADMIN — HYDROCODONE BITARTRATE AND ACETAMINOPHEN 1 TABLET: 5; 325 TABLET ORAL at 18:11

## 2017-11-18 RX ADMIN — LEVOTHYROXINE SODIUM 50 MCG: 50 TABLET ORAL at 06:38

## 2017-11-18 RX ADMIN — KETOROLAC TROMETHAMINE 15 MG: 30 INJECTION, SOLUTION INTRAMUSCULAR at 11:24

## 2017-11-18 RX ADMIN — MEROPENEM 500 MG: 500 INJECTION, POWDER, FOR SOLUTION INTRAVENOUS at 13:28

## 2017-11-18 RX ADMIN — Medication 1 CAPSULE: at 08:36

## 2017-11-18 RX ADMIN — PIPERACILLIN SODIUM,TAZOBACTAM SODIUM 3.38 G: 3; .375 INJECTION, POWDER, FOR SOLUTION INTRAVENOUS at 01:30

## 2017-11-18 RX ADMIN — NEBIVOLOL HYDROCHLORIDE 5 MG: 2.5 TABLET ORAL at 08:36

## 2017-11-18 RX ADMIN — HYDROCODONE BITARTRATE AND ACETAMINOPHEN 1 TABLET: 5; 325 TABLET ORAL at 08:50

## 2017-11-18 RX ADMIN — MORPHINE SULFATE 1 MG: 2 INJECTION, SOLUTION INTRAMUSCULAR; INTRAVENOUS at 16:36

## 2017-11-18 RX ADMIN — MEROPENEM 500 MG: 500 INJECTION, POWDER, FOR SOLUTION INTRAVENOUS at 21:42

## 2017-11-18 RX ADMIN — MORPHINE SULFATE 1 MG: 2 INJECTION, SOLUTION INTRAMUSCULAR; INTRAVENOUS at 01:41

## 2017-11-18 RX ADMIN — FAMOTIDINE 20 MG: 10 INJECTION, SOLUTION INTRAVENOUS at 21:42

## 2017-11-18 RX ADMIN — MORPHINE SULFATE 1 MG: 2 INJECTION, SOLUTION INTRAMUSCULAR; INTRAVENOUS at 11:25

## 2017-11-18 RX ADMIN — PIPERACILLIN SODIUM,TAZOBACTAM SODIUM 3.38 G: 3; .375 INJECTION, POWDER, FOR SOLUTION INTRAVENOUS at 10:00

## 2017-11-18 RX ADMIN — ALUMINUM HYDROXIDE AND MAGNESIUM HYDROXIDE 15 ML: 200; 200 SUSPENSION ORAL at 18:11

## 2017-11-18 NOTE — PROGRESS NOTES
Hospitalist Progress Note    NAME: Renee Carrera   :  1970   MRN:  [id number]       Assessment / Plan:  Severe sepsis POA  Due to UTI (partially treated) POA  No  Pyelonephritis on ct scan    IV zosyn , follow urine Cx from  Follow blood cx  Hx of ESBL in the past   cont IVF at 100 ml/hr  Lactate is normal.  Megha Q     Cystocele/bladder prolapse POA  Urology OP follow up for repair to prevent recurrent UTIs     Hypertension on nebivolol  Hypothyroidism on levothyroxine  Anxiety disorder     Cont home meds           Code Status: Full  Surrogate Decision Maker: Hina Sterling # [phone]     DVT Prophylaxis: Sq lovenox  GI Prophylaxis: not indicated     Baseline: pt is independent at home with ADLs      Body mass index is 31.99 kg/(m^2). Recommended Disposition: Home     Subjective:     Chief Complaint / Reason for Physician Visit  Still reports back and flank pain. Nausea and vomiting improved. Still has urinary discomfort. No fever. Review of Systems:  Symptom Y/N Comments  Symptom Y/N Comments   Fever/Chills n   Chest Pain n    Poor Appetite    Edema     Cough n   Abdominal Pain x    Sputum    Joint Pain     SOB/RIZO n   Pruritis/Rash     Nausea/vomit    Tolerating PT/OT     Diarrhea    Tolerating Diet     Constipation    Other       Could NOT obtain due to:      Objective:     VITALS:   Last 24hrs VS reviewed since prior progress note.  Most recent are:  Patient Vitals for the past 24 hrs:   Temp Pulse Resp BP SpO2   17 1748 98.1 °F (36.7 °C) 69 16 109/63 95 %   17 1353 98.1 °F (36.7 °C) 89 16 118/62 96 %   17 1011 98.3 °F (36.8 °C) 66 17 119/72 96 %   17 0538 97.6 °F (36.4 °C) 67 20 118/65 97 %   17 0152 - 75 - - -   17 2345 97.5 °F (36.4 °C) (!) 111 20 118/76 96 %   17 2322 - (!) 109 22 - 97 %   17 2315 - (!) 111 22 102/68 97 %   17 2304 - (!) 115 17 106/67 97 %   17 2245 - 80 15 100/63 97 %   175 - 81 16 116/74 98 % 11/16/17 2200 - 84 14 113/72 98 %       Intake/Output Summary (Last 24 hours) at 11/17/17 2030  Last data filed at 11/17/17 1425   Gross per 24 hour   Intake              600 ml   Output              900 ml   Net             -300 ml        PHYSICAL EXAM:  General: WD, WN. Alert, cooperative, no acute distress    EENT:  EOMI. Anicteric sclerae. MMM  Resp:  CTA bilaterally, no wheezing or rales. No accessory muscle use  CV:  Regular  rhythm,  No edema  GI:  Soft, rt flank tenderness, no guarding or rigidity  Neurologic:  Alert and oriented X 3, normal speech,   Psych:   Good insight. Not anxious nor agitated  Skin:  No rashes.   No jaundice    Reviewed most current lab test results and cultures  YES  Reviewed most current radiology test results   YES  Review and summation of old records today    NO  Reviewed patient's current orders and MAR    YES  PMH/SH reviewed - no change compared to H&P          Current Facility-Administered Medications:     HYDROcodone-acetaminophen (NORCO) 5-325 mg per tablet 1 Tab, 1 Tab, Oral, Q6H PRN, Galen Steven MD, 1 Tab at 11/17/17 1721    morphine injection 1 mg, 1 mg, IntraVENous, Q4H PRN, Galen Steven MD, 1 mg at 11/17/17 2015    sodium chloride (NS) flush 5-10 mL, 5-10 mL, IntraVENous, PRN, Fritz Bermudez,     piperacillin-tazobactam (ZOSYN) 3.375 g in 0.9% sodium chloride (MBP/ADV) 100 mL, 3.375 g, IntraVENous, Q8H, Ermelinda Wadsworth MD, 3.375 g at 11/17/17 1721    FLUoxetine (PROzac) capsule 40 mg, 40 mg, Oral, DAILY, Ermelinda Wadsworth MD, 40 mg at 11/17/17 0919    hydrOXYzine HCl (ATARAX) tablet 25 mg, 25 mg, Oral, DAILY PRN, Ermelinda Wadsworth MD    levothyroxine (SYNTHROID) tablet 50 mcg, 50 mcg, Oral, ACB, Ermelinda Wadsworth MD, 50 mcg at 11/17/17 0537    nebivolol (BYSTOLIC) tablet 5 mg, 5 mg, Oral, DAILY, Ermelinda Wadsworth MD, 5 mg at 11/17/17 0919    traZODone (DESYREL) tablet 100 mg, 100 mg, Oral, QHS PRN, Ermelinda Wadsworth MD, 100 mg at 11/17/17 0005   [facility] ac& pc-s.therm-b.anim (KATT Q/RISAQUAD), 1 Cap, Oral, DAILY, Joelle Paul MD, 1 Cap at 11/17/17 3413    dextrose 5% and 0.9% NaCl infusion, 100 mL/hr, IntraVENous, CONTINUOUS, Joelle Paul MD, Last Rate: 100 mL/hr at 11/16/17 2343, 100 mL/hr at 11/16/17 2343    sodium chloride (NS) flush 5-10 mL, 5-10 mL, IntraVENous, Q8H, Joelle Paul MD, 10 mL at 11/17/17 1354    sodium chloride (NS) flush 5-10 mL, 5-10 mL, IntraVENous, PRN, Joelle Paul MD    enoxaparin (LOVENOX) injection 40 mg, 40 mg, SubCUTAneous, Q24H, Joelle Paul MD, 40 mg at 11/16/17 2305    famotidine (PF) (PEPCID) 20 mg in sodium chloride 0.9 % 10 mL injection, 20 mg, IntraVENous, Q12H, Joelle Paul MD, 20 mg at 11/17/17 0918    ondansetron (ZOFRAN) injection 4 mg, 4 mg, IntraVENous, Q4H PRN, Joelle Paul MD    acetaminophen (TYLENOL) tablet 650 mg, 650 mg, Oral, Q6H PRN, Joelle Paul MD, 650 mg at 11/17/17 3213  ________________________________________________________________________  Care Plan discussed with:    Comments   Patient y    Family      RN y    Care Manager     Consultant                        Multidiciplinary team rounds were held today with , nursing, pharmacist and clinical coordinator. Patient's plan of care was discussed; medications were reviewed and discharge planning was addressed. ________________________________________________________________________  Total NON critical care TIME:  25    Minutes    Total CRITICAL CARE TIME Spent:   Minutes non procedure based      Comments   >50% of visit spent in counseling and coordination of care     ________________________________________________________________________  Oj Delgadillo MD     Procedures: see electronic medical records for all procedures/Xrays and details which were not copied into this note but were reviewed prior to creation of Plan.       LABS:  I reviewed today's most current labs and imaging studies.   Pertinent labs include:  Recent Labs      11/17/17   0535  11/16/17   1744   WBC  3.0*  4.4   HGB  11.4*  12.7   HCT  36.4  38.3   PLT  138*  163     Recent Labs      11/17/17   0535  11/16/17   1744   NA  144  142   K  4.0  3.3*   CL  113*  109*   CO2  24  22   GLU  72  66   BUN  7  6   CREA  0.55  0.62   CA  7.6*  7.8*   ALB   --   3.5   TBILI   --   0.2   SGOT   --   41*   ALT   --   47       Signed: Adal Aldridge MD

## 2017-11-18 NOTE — PROGRESS NOTES
Hospitalist Progress Note    NAME: Matti Arango   :  1970   MRN:  [id number]       Assessment / Plan:  Severe sepsis POA  ESBL  UTI (partially treated) POA  No  Pyelonephritis on ct scan   Urine cx grew ESBL so will start imipenem. Will check with ID if   phospomycin will be a option   Blood cx NGTD. Hx of ESBL in the past   Lactate is normal.  Megha Q    Migraine headache  -try one dose of Toradol as she does not absorb well  Due to gastric bypass     Cystocele/bladder prolapse POA  Urology OP follow up for repair to prevent recurrent UTIs     Hypertension on nebivolol  Hypothyroidism on levothyroxine  Anxiety disorder     Cont home meds           Code Status: Full  Surrogate Decision Maker: Kanwal Mosley # [phone]     DVT Prophylaxis: Sq lovenox  GI Prophylaxis: not indicated     Baseline: pt is independent at home with ADLs      Body mass index is 33.23 kg/(m^2). Recommended Disposition: Home     Subjective:     Chief Complaint / Reason for Physician Visit  She reports heart burn along with her usual migraine headaches. No dysuria. No fever. No abdominal pain. Review of Systems:  Symptom Y/N Comments  Symptom Y/N Comments   Fever/Chills n   Chest Pain n    Poor Appetite    Edema     Cough n   Abdominal Pain n    Sputum    Joint Pain     SOB/RIZO n   Pruritis/Rash     Nausea/vomit    Tolerating PT/OT     Diarrhea    Tolerating Diet     Constipation    Other y  Headache      Could NOT obtain due to:      Objective:     VITALS:   Last 24hrs VS reviewed since prior progress note.  Most recent are:  Patient Vitals for the past 24 hrs:   Temp Pulse Resp BP SpO2   17 1033 98.1 °F (36.7 °C) 78 18 128/71 98 %   17 0710 97.7 °F (36.5 °C) 74 18 112/78 96 %   17 0319 97.8 °F (36.6 °C) 77 18 114/61 93 %   17 2134 97.7 °F (36.5 °C) (!) 118 16 114/80 97 %   17 1748 98.1 °F (36.7 °C) 69 16 109/63 95 %   17 1353 98.1 °F (36.7 °C) 89 16 118/62 96 %       Intake/Output Summary (Last 24 hours) at 11/18/17 1349  Last data filed at 11/17/17 1425   Gross per 24 hour   Intake              360 ml   Output                0 ml   Net              360 ml        PHYSICAL EXAM:  General: fcooperative, no acute distress    EENT:  EOMI. Anicteric sclerae. MMM  Resp:  CTA bilaterally, no wheezing or rales. No accessory muscle use  CV:  Regular  rhythm,  No edema  GI:  Soft, rt flank tenderness, no guarding or rigidity  Neurologic:  Alert and oriented X 3, normal speech,   Psych:   Good insight. Not anxious nor agitated  Skin:  No rashes.   No jaundice    Reviewed most current lab test results and cultures  YES  Reviewed most current radiology test results   YES  Review and summation of old records today    NO  Reviewed patient's current orders and MAR    YES  PMH/SH reviewed - no change compared to H&P          Current Facility-Administered Medications:     aluminum-magnesium hydroxide (MAALOX) oral suspension 15 mL, 15 mL, Oral, QID PRN, Irma Reyes MD, 15 mL at 11/18/17 1123    cyclobenzaprine (FLEXERIL) tablet 5 mg, 5 mg, Oral, DAILY PRN, Irma Reyes MD, 5 mg at 11/18/17 1213    meropenem (MERREM) 500 mg in 0.9% sodium chloride (MBP/ADV) 50 mL, 500 mg, IntraVENous, Q8H, Irma Reyes MD, Last Rate: 16.7 mL/hr at 11/18/17 1328, 500 mg at 11/18/17 1328    HYDROcodone-acetaminophen (NORCO) 5-325 mg per tablet 1 Tab, 1 Tab, Oral, Q6H PRN, Irma Reyes MD, 1 Tab at 11/18/17 0850    morphine injection 1 mg, 1 mg, IntraVENous, Q4H PRN, Irma Reyes MD, 1 mg at 11/18/17 1125    sodium chloride (NS) flush 5-10 mL, 5-10 mL, IntraVENous, PRN, Ana Phoenix DO    FLUoxetine (PROzac) capsule 40 mg, 40 mg, Oral, DAILY, Mari Patel MD, 40 mg at 11/18/17 3029    hydrOXYzine HCl (ATARAX) tablet 25 mg, 25 mg, Oral, DAILY PRN, Mari Patel MD    levothyroxine (SYNTHROID) tablet 50 mcg, 50 mcg, Oral, ACB, Mari Patel MD, 50 mcg at 11/18/17 0638    nebivolol (BYSTOLIC) tablet 5 mg, 5 mg, Oral, DAILY, Eufemia Brooks MD, 5 mg at 11/18/17 0836    traZODone (DESYREL) tablet 100 mg, 100 mg, Oral, QHS PRN, Eufemia Brooks MD, 100 mg at 11/17/17 2204    lactobac ac& pc-s.therm-b.anim (KATT Q/RISAQUAD), 1 Cap, Oral, DAILY, Eufemia Brooks MD, 1 Cap at 11/18/17 8703    sodium chloride (NS) flush 5-10 mL, 5-10 mL, IntraVENous, Q8H, Eufemia Brooks MD, 10 mL at 11/18/17 1329    sodium chloride (NS) flush 5-10 mL, 5-10 mL, IntraVENous, PRN, Eufemia Brooks MD    enoxaparin (LOVENOX) injection 40 mg, 40 mg, SubCUTAneous, Q24H, Eufemia Brooks MD, 40 mg at 11/17/17 2159    famotidine (PF) (PEPCID) 20 mg in sodium chloride 0.9 % 10 mL injection, 20 mg, IntraVENous, Q12H, Eufemia Brooks MD, 20 mg at 11/18/17 0837    ondansetron (ZOFRAN) injection 4 mg, 4 mg, IntraVENous, Q4H PRN, Eufemia Brooks MD    acetaminophen (TYLENOL) tablet 650 mg, 650 mg, Oral, Q6H PRN, Eufemia Brooks MD, 650 mg at 11/17/17 0283  ________________________________________________________________________  Care Plan discussed with:    Comments   Patient y    Family      RN y    Care Manager     Consultant                        Multidiciplinary team rounds were held today with , nursing, pharmacist and clinical coordinator. Patient's plan of care was discussed; medications were reviewed and discharge planning was addressed. ________________________________________________________________________  Total NON critical care TIME:  25    Minutes    Total CRITICAL CARE TIME Spent:   Minutes non procedure based      Comments   >50% of visit spent in counseling and coordination of care     ________________________________________________________________________  Juana Mcdonald MD     Procedures: see electronic medical records for all procedures/Xrays and details which were not copied into this note but were reviewed prior to creation of Plan.       LABS:  I reviewed today's most current labs and imaging studies.   Pertinent labs include:  Recent Labs      11/18/17   0426  11/17/17   0535  11/16/17   1744   WBC  3.2*  3.0*  4.4   HGB  11.4*  11.4*  12.7   HCT  36.1  36.4  38.3   PLT  136*  138*  163     Recent Labs      11/17/17   0535  11/16/17   1744   NA  144  142   K  4.0  3.3*   CL  113*  109*   CO2  24  22   GLU  72  66   BUN  7  6   CREA  0.55  0.62   CA  7.6*  7.8*   ALB   --   3.5   TBILI   --   0.2   SGOT   --   41*   ALT   --   47       Signed: Jeovany Corona MD

## 2017-11-18 NOTE — PROGRESS NOTES
Problem: Falls - Risk of  Goal: *Absence of Falls  Document Jocelyn Fall Risk and appropriate interventions in the flowsheet.    Outcome: Progressing Towards Goal  Fall Risk Interventions:            Medication Interventions: Patient to call before getting OOB         History of Falls Interventions: Evaluate medications/consider consulting pharmacy, Door open when patient unattended

## 2017-11-19 LAB
GLUCOSE BLD STRIP.AUTO-MCNC: 91 MG/DL (ref 65–100)
SERVICE CMNT-IMP: NORMAL

## 2017-11-19 PROCEDURE — 74011250637 HC RX REV CODE- 250/637: Performed by: INTERNAL MEDICINE

## 2017-11-19 PROCEDURE — 74011000250 HC RX REV CODE- 250: Performed by: INTERNAL MEDICINE

## 2017-11-19 PROCEDURE — 65270000029 HC RM PRIVATE

## 2017-11-19 PROCEDURE — 74011250636 HC RX REV CODE- 250/636: Performed by: INTERNAL MEDICINE

## 2017-11-19 PROCEDURE — 74011000258 HC RX REV CODE- 258: Performed by: INTERNAL MEDICINE

## 2017-11-19 PROCEDURE — 82962 GLUCOSE BLOOD TEST: CPT

## 2017-11-19 RX ADMIN — MEROPENEM 500 MG: 500 INJECTION, POWDER, FOR SOLUTION INTRAVENOUS at 13:21

## 2017-11-19 RX ADMIN — MORPHINE SULFATE 1 MG: 2 INJECTION, SOLUTION INTRAMUSCULAR; INTRAVENOUS at 23:10

## 2017-11-19 RX ADMIN — Medication 1 CAPSULE: at 08:41

## 2017-11-19 RX ADMIN — FAMOTIDINE 20 MG: 10 INJECTION, SOLUTION INTRAVENOUS at 08:42

## 2017-11-19 RX ADMIN — MORPHINE SULFATE 1 MG: 2 INJECTION, SOLUTION INTRAMUSCULAR; INTRAVENOUS at 01:09

## 2017-11-19 RX ADMIN — MEROPENEM 500 MG: 500 INJECTION, POWDER, FOR SOLUTION INTRAVENOUS at 05:42

## 2017-11-19 RX ADMIN — MORPHINE SULFATE 1 MG: 2 INJECTION, SOLUTION INTRAMUSCULAR; INTRAVENOUS at 05:32

## 2017-11-19 RX ADMIN — Medication 10 ML: at 05:42

## 2017-11-19 RX ADMIN — Medication 10 ML: at 13:21

## 2017-11-19 RX ADMIN — NEBIVOLOL HYDROCHLORIDE 5 MG: 2.5 TABLET ORAL at 08:41

## 2017-11-19 RX ADMIN — CYCLOBENZAPRINE HYDROCHLORIDE 5 MG: 10 TABLET, FILM COATED ORAL at 10:59

## 2017-11-19 RX ADMIN — Medication 10 ML: at 21:26

## 2017-11-19 RX ADMIN — TRAZODONE HYDROCHLORIDE 100 MG: 50 TABLET ORAL at 21:25

## 2017-11-19 RX ADMIN — FAMOTIDINE 20 MG: 10 INJECTION, SOLUTION INTRAVENOUS at 21:27

## 2017-11-19 RX ADMIN — MORPHINE SULFATE 1 MG: 2 INJECTION, SOLUTION INTRAMUSCULAR; INTRAVENOUS at 09:48

## 2017-11-19 RX ADMIN — ALUMINUM HYDROXIDE AND MAGNESIUM HYDROXIDE 15 ML: 200; 200 SUSPENSION ORAL at 05:40

## 2017-11-19 RX ADMIN — MORPHINE SULFATE 1 MG: 2 INJECTION, SOLUTION INTRAMUSCULAR; INTRAVENOUS at 18:43

## 2017-11-19 RX ADMIN — ENOXAPARIN SODIUM 40 MG: 40 INJECTION SUBCUTANEOUS at 21:26

## 2017-11-19 RX ADMIN — MEROPENEM 500 MG: 500 INJECTION, POWDER, FOR SOLUTION INTRAVENOUS at 21:33

## 2017-11-19 RX ADMIN — LEVOTHYROXINE SODIUM 50 MCG: 50 TABLET ORAL at 08:41

## 2017-11-19 RX ADMIN — ALUMINUM HYDROXIDE AND MAGNESIUM HYDROXIDE 15 ML: 200; 200 SUSPENSION ORAL at 10:56

## 2017-11-19 RX ADMIN — FLUOXETINE 40 MG: 20 CAPSULE ORAL at 08:41

## 2017-11-19 RX ADMIN — HYDROCODONE BITARTRATE AND ACETAMINOPHEN 1 TABLET: 5; 325 TABLET ORAL at 15:58

## 2017-11-19 RX ADMIN — MORPHINE SULFATE 1 MG: 2 INJECTION, SOLUTION INTRAMUSCULAR; INTRAVENOUS at 14:16

## 2017-11-19 RX ADMIN — Medication 10 ML: at 23:10

## 2017-11-19 NOTE — PROGRESS NOTES
Problem: Falls - Risk of  Goal: *Absence of Falls  Document Jocelyn Fall Risk and appropriate interventions in the flowsheet. Outcome: Progressing Towards Goal  Fall Risk Interventions:  Mobility Interventions: Patient to call before getting OOB    Mentation Interventions: Adequate sleep, hydration, pain control    Medication Interventions: Patient to call before getting OOB    Elimination Interventions: Bed/chair exit alarm    History of Falls Interventions:  Investigate reason for fall, Evaluate medications/consider consulting pharmacy

## 2017-11-19 NOTE — PROGRESS NOTES
Bedside and Verbal shift change report given to Jie Borjas (oncoming nurse) by Niki Giron  (offgoing nurse). Report included the following information SBAR, Kardex, Intake/Output, MAR and Recent Results.

## 2017-11-19 NOTE — PROGRESS NOTES
CM received consult for IV medication. CM has send referral to [facility] via [facility].      [address]

## 2017-11-19 NOTE — PROGRESS NOTES
0730- Bedside and Verbal shift change report given to Juanita Lerma RN (oncoming nurse) by Pascual Aleman RN (offgoing nurse). Report included the following information SBAR, Kardex, ED Summary, Procedure Summary, Intake/Output, MAR, Accordion, Recent Results and Med Rec Status.

## 2017-11-19 NOTE — PROGRESS NOTES
Hospitalist Progress Note    NAME: Carlos Nascimento   :  1970   MRN:  [id number]       Assessment / Plan:  Severe sepsis POA  ESBL  UTI (partially treated) POA  No  Pyelonephritis on ct scan   Urine cx grew ESBL so  Started on meropenem. Will need if abx for a total of 10 days on out pt basis. Will consult case management tomorrow, consider picc line and infusion at home vs infusion center. Phospho mycin may not be an option due to her frequent uti and upper UTI in the past   Blood cx NGTD. Hx of ESBL in the past   Lactate is normal.  Megha Q    Migraine headache  -s/p Toradol with improvement of headache.       Cystocele/bladder prolapse POA  Urology OP follow up for repair to prevent recurrent UTIs     Hypertension on nebivolol  Hypothyroidism on levothyroxine  Anxiety disorder     Cont home meds           Code Status: Full  Surrogate Decision Maker: Clemencia Waldrop # [phone]     DVT Prophylaxis: Sq lovenox  GI Prophylaxis: not indicated     Baseline: pt is independent at home with ADLs      Body mass index is 34.06 kg/(m^2). Recommended Disposition: Home     Subjective:     Chief Complaint / Reason for Physician Visit  Headache is improved. Still has lot of fatigue and lot of muscle aches. No fever. No diarrhea. No flank pain. Review of Systems:  Symptom Y/N Comments  Symptom Y/N Comments   Fever/Chills n   Chest Pain n    Poor Appetite    Edema     Cough n   Abdominal Pain n    Sputum    Joint Pain     SOB/RIZO n   Pruritis/Rash     Nausea/vomit n   Tolerating PT/OT     Diarrhea    Tolerating Diet     Constipation    Other y  Fatigue      Could NOT obtain due to:      Objective:     VITALS:   Last 24hrs VS reviewed since prior progress note.  Most recent are:  Patient Vitals for the past 24 hrs:   Temp Pulse Resp BP SpO2   17 1035 98.1 °F (36.7 °C) 80 18 111/59 98 %   17 0553 98.1 °F (36.7 °C) 69 18 119/76 96 %   17 0222 97.7 °F (36.5 °C) 80 18 102/88 96 %   17 1838 98 °F (36.7 °C) (!) 107 18 126/77 96 %   11/18/17 1523 98.1 °F (36.7 °C) 94 18 120/69 100 %     No intake or output data in the 24 hours ending 11/19/17 1317     PHYSICAL EXAM:  General: cooperative, no acute distress    EENT:  EOMI. Anicteric sclerae. MMM  Resp:  CTA bilaterally, no wheezing or rales. No accessory muscle use  CV:  Regular  rhythm,  No edema  GI:  Soft, rt flank tenderness, no guarding or rigidity  Neurologic:  Alert and oriented X 3, normal speech,   Psych:   Good insight. Not anxious nor agitated  Skin:  No rashes.   No jaundice    Reviewed most current lab test results and cultures  YES  Reviewed most current radiology test results   YES  Review and summation of old records today    NO  Reviewed patient's current orders and MAR    YES  PMH/SH reviewed - no change compared to H&P          Current Facility-Administered Medications:     aluminum-magnesium hydroxide (MAALOX) oral suspension 15 mL, 15 mL, Oral, QID PRN, Romana Gainer, MD, 15 mL at 11/19/17 1056    cyclobenzaprine (FLEXERIL) tablet 5 mg, 5 mg, Oral, DAILY PRN, Romana Gainer, MD, 5 mg at 11/19/17 1059    meropenem (MERREM) 500 mg in 0.9% sodium chloride (MBP/ADV) 50 mL, 500 mg, IntraVENous, Q8H, Romana Gainer, MD, Last Rate: 16.7 mL/hr at 11/19/17 0542, 500 mg at 11/19/17 0542    HYDROcodone-acetaminophen (NORCO) 5-325 mg per tablet 1 Tab, 1 Tab, Oral, Q6H PRN, Romana Gainer, MD, 1 Tab at 11/18/17 1811    morphine injection 1 mg, 1 mg, IntraVENous, Q4H PRN, Romana Gainer, MD, 1 mg at 11/19/17 0948    sodium chloride (NS) flush 5-10 mL, 5-10 mL, IntraVENous, PRN, Vicmallorie Sarah, DO    FLUoxetine (PROzac) capsule 40 mg, 40 mg, Oral, DAILY, Armida Garza MD, 40 mg at 11/19/17 0841    hydrOXYzine HCl (ATARAX) tablet 25 mg, 25 mg, Oral, DAILY PRN, Armida Garza MD    levothyroxine (SYNTHROID) tablet 50 mcg, 50 mcg, Oral, ACB, Armida Garza MD, 50 mcg at 11/19/17 0841    nebivolol (BYSTOLIC) tablet 5 mg, 5 mg, Oral, Omar Santana MD, 5 mg at 11/19/17 0841    traZODone (DESYREL) tablet 100 mg, 100 mg, Oral, QHS PRN, Aleyda Yang MD, Stopped at 11/18/17 2142    lactobac ac& pc-s.leti-b.anim (KATT Q/RISAQUAD), 1 Cap, Oral, DAILY, Aleyda Yang MD, 1 Cap at 11/19/17 0841    sodium chloride (NS) flush 5-10 mL, 5-10 mL, IntraVENous, Q8H, Aleyda Yang MD, 10 mL at 11/19/17 0542    sodium chloride (NS) flush 5-10 mL, 5-10 mL, IntraVENous, PRN, Aleyda Yang MD    enoxaparin (LOVENOX) injection 40 mg, 40 mg, SubCUTAneous, Q24H, Aleyda Yang MD, 40 mg at 11/18/17 2141    famotidine (PF) (PEPCID) 20 mg in sodium chloride 0.9 % 10 mL injection, 20 mg, IntraVENous, Q12H, Aleyda Yang MD, 20 mg at 11/19/17 0842    ondansetron (ZOFRAN) injection 4 mg, 4 mg, IntraVENous, Q4H PRN, Aleyda Yang MD    acetaminophen (TYLENOL) tablet 650 mg, 650 mg, Oral, Q6H PRN, Aleyda Yang MD, 650 mg at 11/17/17 5748  ________________________________________________________________________  Care Plan discussed with:    Comments   Patient y    Family      RN y    Care Manager     Consultant                        Multidiciplinary team rounds were held today with , nursing, pharmacist and clinical coordinator. Patient's plan of care was discussed; medications were reviewed and discharge planning was addressed. ________________________________________________________________________  Total NON critical care TIME:  25    Minutes    Total CRITICAL CARE TIME Spent:   Minutes non procedure based      Comments   >50% of visit spent in counseling and coordination of care     ________________________________________________________________________  Ankita Bourne MD     Procedures: see electronic medical records for all procedures/Xrays and details which were not copied into this note but were reviewed prior to creation of Plan.       LABS:  I reviewed today's most current labs and imaging studies.   Pertinent labs include:  Recent Labs      11/18/17   0426  11/17/17   0535  11/16/17   1744   WBC  3.2*  3.0*  4.4   HGB  11.4*  11.4*  12.7   HCT  36.1  36.4  38.3   PLT  136*  138*  163     Recent Labs      11/17/17   0535  11/16/17   1744   NA  144  142   K  4.0  3.3*   CL  113*  109*   CO2  24  22   GLU  72  66   BUN  7  6   CREA  0.55  0.62   CA  7.6*  7.8*   ALB   --   3.5   TBILI   --   0.2   SGOT   --   41*   ALT   --   47       Signed: Lawyer Nhi MD

## 2017-11-20 LAB
ANION GAP SERPL CALC-SCNC: 3 MMOL/L (ref 5–15)
BUN SERPL-MCNC: 16 MG/DL (ref 6–20)
BUN/CREAT SERPL: 34 (ref 12–20)
CALCIUM SERPL-MCNC: 7.6 MG/DL (ref 8.5–10.1)
CHLORIDE SERPL-SCNC: 107 MMOL/L (ref 97–108)
CO2 SERPL-SCNC: 32 MMOL/L (ref 21–32)
CREAT SERPL-MCNC: 0.47 MG/DL (ref 0.55–1.02)
GLUCOSE BLD STRIP.AUTO-MCNC: 89 MG/DL (ref 65–100)
GLUCOSE SERPL-MCNC: 79 MG/DL (ref 65–100)
POTASSIUM SERPL-SCNC: 4.2 MMOL/L (ref 3.5–5.1)
SERVICE CMNT-IMP: NORMAL
SODIUM SERPL-SCNC: 142 MMOL/L (ref 136–145)

## 2017-11-20 PROCEDURE — 74011250637 HC RX REV CODE- 250/637: Performed by: HOSPITALIST

## 2017-11-20 PROCEDURE — 36415 COLL VENOUS BLD VENIPUNCTURE: CPT | Performed by: INTERNAL MEDICINE

## 2017-11-20 PROCEDURE — 74011000258 HC RX REV CODE- 258: Performed by: INTERNAL MEDICINE

## 2017-11-20 PROCEDURE — 74011250637 HC RX REV CODE- 250/637: Performed by: INTERNAL MEDICINE

## 2017-11-20 PROCEDURE — 65270000029 HC RM PRIVATE

## 2017-11-20 PROCEDURE — 80048 BASIC METABOLIC PNL TOTAL CA: CPT | Performed by: INTERNAL MEDICINE

## 2017-11-20 PROCEDURE — 82962 GLUCOSE BLOOD TEST: CPT

## 2017-11-20 PROCEDURE — 74011250636 HC RX REV CODE- 250/636: Performed by: INTERNAL MEDICINE

## 2017-11-20 PROCEDURE — 74011000250 HC RX REV CODE- 250: Performed by: INTERNAL MEDICINE

## 2017-11-20 RX ORDER — FAMOTIDINE 20 MG/1
20 TABLET, FILM COATED ORAL EVERY 12 HOURS
Status: DISCONTINUED | OUTPATIENT
Start: 2017-11-20 | End: 2017-11-22 | Stop reason: HOSPADM

## 2017-11-20 RX ORDER — FUROSEMIDE 10 MG/ML
40 INJECTION INTRAMUSCULAR; INTRAVENOUS ONCE
Status: COMPLETED | OUTPATIENT
Start: 2017-11-20 | End: 2017-11-20

## 2017-11-20 RX ORDER — TRAZODONE HYDROCHLORIDE 50 MG/1
100 TABLET ORAL ONCE
Status: DISCONTINUED | OUTPATIENT
Start: 2017-11-20 | End: 2017-11-20

## 2017-11-20 RX ADMIN — FAMOTIDINE 20 MG: 10 INJECTION, SOLUTION INTRAVENOUS at 09:02

## 2017-11-20 RX ADMIN — MORPHINE SULFATE 1 MG: 2 INJECTION, SOLUTION INTRAMUSCULAR; INTRAVENOUS at 11:34

## 2017-11-20 RX ADMIN — MORPHINE SULFATE 1 MG: 2 INJECTION, SOLUTION INTRAMUSCULAR; INTRAVENOUS at 23:48

## 2017-11-20 RX ADMIN — FLUOXETINE 40 MG: 20 CAPSULE ORAL at 09:08

## 2017-11-20 RX ADMIN — HYDROCODONE BITARTRATE AND ACETAMINOPHEN 1 TABLET: 5; 325 TABLET ORAL at 09:08

## 2017-11-20 RX ADMIN — Medication 1 CAPSULE: at 09:08

## 2017-11-20 RX ADMIN — ENOXAPARIN SODIUM 40 MG: 40 INJECTION SUBCUTANEOUS at 21:34

## 2017-11-20 RX ADMIN — FAMOTIDINE 20 MG: 20 TABLET, FILM COATED ORAL at 21:35

## 2017-11-20 RX ADMIN — NEBIVOLOL HYDROCHLORIDE 5 MG: 2.5 TABLET ORAL at 09:08

## 2017-11-20 RX ADMIN — MORPHINE SULFATE 1 MG: 2 INJECTION, SOLUTION INTRAMUSCULAR; INTRAVENOUS at 06:48

## 2017-11-20 RX ADMIN — MORPHINE SULFATE 1 MG: 2 INJECTION, SOLUTION INTRAMUSCULAR; INTRAVENOUS at 15:32

## 2017-11-20 RX ADMIN — Medication 10 ML: at 13:50

## 2017-11-20 RX ADMIN — MEROPENEM 500 MG: 500 INJECTION, POWDER, FOR SOLUTION INTRAVENOUS at 05:57

## 2017-11-20 RX ADMIN — FUROSEMIDE 40 MG: 10 INJECTION, SOLUTION INTRAMUSCULAR; INTRAVENOUS at 14:23

## 2017-11-20 RX ADMIN — Medication 10 ML: at 05:59

## 2017-11-20 RX ADMIN — HYDROCODONE BITARTRATE AND ACETAMINOPHEN 1 TABLET: 5; 325 TABLET ORAL at 21:35

## 2017-11-20 RX ADMIN — HYDROCODONE BITARTRATE AND ACETAMINOPHEN 1 TABLET: 5; 325 TABLET ORAL at 15:24

## 2017-11-20 RX ADMIN — MORPHINE SULFATE 1 MG: 2 INJECTION, SOLUTION INTRAMUSCULAR; INTRAVENOUS at 19:25

## 2017-11-20 RX ADMIN — MEROPENEM 500 MG: 500 INJECTION, POWDER, FOR SOLUTION INTRAVENOUS at 13:50

## 2017-11-20 RX ADMIN — LEVOTHYROXINE SODIUM 50 MCG: 50 TABLET ORAL at 06:37

## 2017-11-20 RX ADMIN — MEROPENEM 500 MG: 500 INJECTION, POWDER, FOR SOLUTION INTRAVENOUS at 21:35

## 2017-11-20 RX ADMIN — Medication 10 ML: at 21:36

## 2017-11-20 RX ADMIN — TRAZODONE HYDROCHLORIDE 100 MG: 50 TABLET ORAL at 21:34

## 2017-11-20 NOTE — PROGRESS NOTES
Sent urine sample to lab per Dr Maritza Kaufman for [address]  Dr Maritza Kaufman who was unable to put order in [address] [place] [address] spoke to [facility] in the lab. Called lab spoke to Katlyn Weeks who said to call Gavin You at client services [phone] in the am to follow-up.

## 2017-11-20 NOTE — INTERDISCIPLINARY ROUNDS
Bedside interdisciplinary rounds were held today to discuss patient plan of care and outcomes. The following members were present: Physician, Nurse, Clinical Care Leader, Pharmacy, Physical Therapy, and Case Management. Plan:  Continue present treatment. CM to follow up with attending MD regarding definitive outpatient antibiotic plan. Patient may need PICC line prior to discharge.

## 2017-11-20 NOTE — PROGRESS NOTES
Hospitalist Progress Note    NAME: Mirtha Dukes   :  1970   MRN:  [id number]       Assessment / Plan:  55 y old with hx of bladder prolapse, recurrent UTI presented with dysuria and diagnosed to have ESBL UTI. Severe sepsis POA resolved   ESBL  UTI   POA  No  Pyelonephritis on ct scan   Urine cx grew ESBL so  Started on meropenem. D/w Dr Sarkis Oconnell who informs me that if no pyelonephritis now Phospho mycin will be option. Called [facility] in Lab and asked to run Phospho mycin E test. informed case mgmt that pt might also need Iv abx with home health if phospho mycin will not be a option. Per case mgmt iv abx are covered at home. Blood cx NGTD. Hx of ESBL in the past   Lactate is normal.    Reported weight again and feeling of puffiness  -will give one dose of lasix iv 1    Migraine headache  -s/p Toradol with improvement of headache.       Cystocele/bladder prolapse POA  Urology OP follow up for repair to prevent recurrent UTIs     Hypertension on nebivolol  Hypothyroidism on levothyroxine  Anxiety disorder     Cont home meds           Code Status: Full  Surrogate Decision Maker: Bozena Linton # [phone]     DVT Prophylaxis: Sq lovenox  GI Prophylaxis: not indicated     Baseline: pt is independent at home with ADLs      Body mass index is 34.06 kg/(m^2). Recommended Disposition: Home based on phospho mycin testing vs iv abx. Subjective:     Chief Complaint / Reason for Physician Visit  She reports feeling puffy and swollen all over from iv fluids. No headache. No diarrhea. No dysuria or urgency.     Review of Systems:  Symptom Y/N Comments  Symptom Y/N Comments   Fever/Chills n   Chest Pain n    Poor Appetite n   Edema     Cough n   Abdominal Pain n    Sputum    Joint Pain y    SOB/RIZO n   Pruritis/Rash     Nausea/vomit n   Tolerating PT/OT     Diarrhea n   Tolerating Diet     Constipation    Other y  Fatigue      Could NOT obtain due to:      Objective:     VITALS:   Last 24hrs VS reviewed since prior progress note. Most recent are:  Patient Vitals for the past 24 hrs:   Temp Pulse Resp BP SpO2   11/20/17 1023 98.2 °F (36.8 °C) 80 17 108/72 99 %   11/20/17 0910 - 86 - 113/65 -   11/20/17 0620 97.9 °F (36.6 °C) 70 18 112/72 99 %   11/20/17 0302 97.8 °F (36.6 °C) 76 18 102/73 93 %   11/19/17 2315 - - - 95/61 -   11/19/17 2310 97.6 °F (36.4 °C) 83 20 (!) 87/61 95 %   11/19/17 1831 98 °F (36.7 °C) 83 18 115/71 95 %       Intake/Output Summary (Last 24 hours) at 11/20/17 1501  Last data filed at 11/20/17 1100   Gross per 24 hour   Intake              880 ml   Output              800 ml   Net               80 ml        PHYSICAL EXAM:  General: cooperative, no acute distress    EENT:  EOMI. Anicteric sclerae. MMM  Resp:  CTA bilaterally, no wheezing or rales. No accessory muscle use  CV:  Regular  rhythm,  No edema  GI:  Soft, rt flank tenderness, no guarding or rigidity  Neurologic:  Alert and oriented X 3, normal speech,   Psych:   Good insight. Not anxious nor agitated  Skin:  No rashes.   No jaundice    Reviewed most current lab test results and cultures  YES  Reviewed most current radiology test results   YES  Review and summation of old records today    NO  Reviewed patient's current orders and MAR    YES  PMH/SH reviewed - no change compared to H&P          Current Facility-Administered Medications:     famotidine (PEPCID) tablet 20 mg, 20 mg, Oral, Q12H, Blessing Mercer MD    aluminum-magnesium hydroxide (MAALOX) oral suspension 15 mL, 15 mL, Oral, QID PRN, Nicola Box MD, 15 mL at 11/19/17 1056    cyclobenzaprine (FLEXERIL) tablet 5 mg, 5 mg, Oral, DAILY PRN, Nicola Box MD, 5 mg at 11/19/17 1059    meropenem (MERREM) 500 mg in 0.9% sodium chloride (MBP/ADV) 50 mL, 500 mg, IntraVENous, Q8H, Nicola Box MD, Last Rate: 16.7 mL/hr at 11/20/17 1350, 500 mg at 11/20/17 1350    HYDROcodone-acetaminophen (NORCO) 5-325 mg per tablet 1 Tab, 1 Tab, Oral, Q6H PRN, Nicola Box MD, 1 Tab at 11/20/17 0908    morphine injection 1 mg, 1 mg, IntraVENous, Q4H PRN, Diego Brandt MD, 1 mg at 11/20/17 1134    sodium chloride (NS) flush 5-10 mL, 5-10 mL, IntraVENous, PRN, Elsie Engle DO    FLUoxetine (PROzac) capsule 40 mg, 40 mg, Oral, DAILY, Gonzalo Patricia MD, 40 mg at 11/20/17 0908    hydrOXYzine HCl (ATARAX) tablet 25 mg, 25 mg, Oral, DAILY PRN, Gonzalo Patricia MD    levothyroxine (SYNTHROID) tablet 50 mcg, 50 mcg, Oral, ACB, Gonzalo Patricia MD, 50 mcg at 11/20/17 1516    nebivolol (BYSTOLIC) tablet 5 mg, 5 mg, Oral, DAILY, Gonzalo Patricia MD, 5 mg at 11/20/17 0908    traZODone (DESYREL) tablet 100 mg, 100 mg, Oral, QHS PRN, Gonzalo Patricia MD, 100 mg at 11/19/17 2125    lactobac ac& pc-s.therm-b.anim (KATT Q/RISAQUAD), 1 Cap, Oral, DAILY, Gonzalo Patricia MD, 1 Cap at 11/20/17 0908    sodium chloride (NS) flush 5-10 mL, 5-10 mL, IntraVENous, Q8H, Gonzalo Patricia MD, 10 mL at 11/20/17 1350    sodium chloride (NS) flush 5-10 mL, 5-10 mL, IntraVENous, PRN, Gonzalo Patricia MD, 10 mL at 11/19/17 2310    enoxaparin (LOVENOX) injection 40 mg, 40 mg, SubCUTAneous, Q24H, Gonzalo Patricia MD, 40 mg at 11/19/17 2126    ondansetron (ZOFRAN) injection 4 mg, 4 mg, IntraVENous, Q4H PRN, Gonzalo Patricia MD    acetaminophen (TYLENOL) tablet 650 mg, 650 mg, Oral, Q6H PRN, Gonzalo Patricia MD, 650 mg at 11/17/17 7760  ________________________________________________________________________  Care Plan discussed with:    Comments   Patient y    Family      RN y    Care Manager     Consultant                        Multidiciplinary team rounds were held today with , nursing, pharmacist and clinical coordinator. Patient's plan of care was discussed; medications were reviewed and discharge planning was addressed.      ________________________________________________________________________  Total NON critical care TIME:  25    Minutes    Total CRITICAL CARE TIME Spent: Minutes non procedure based      Comments   >50% of visit spent in counseling and coordination of care     ________________________________________________________________________  Roselyn Rosas MD     Procedures: see electronic medical records for all procedures/Xrays and details which were not copied into this note but were reviewed prior to creation of Plan. LABS:  I reviewed today's most current labs and imaging studies.   Pertinent labs include:  Recent Labs      11/18/17   0426   WBC  3.2*   HGB  11.4*   HCT  36.1   PLT  136*     Recent Labs      11/20/17   0544   NA  142   K  4.2   CL  107   CO2  32   GLU  79   BUN  16   CREA  0.47*   CA  7.6*       Signed: Roselyn Rosas MD

## 2017-11-20 NOTE — PROGRESS NOTES
SW received message from attending MD who stated to check benefits for home IV ABX. Referral sent to [facility] over the weekend and they responded that pt has 100% coverage. Attending MD made aware and stated he would determine whether she would need IV vs PO. SW will continue to follow and assist with additional discharge needs.     Marily Camargo, MSW  Ext [phone]

## 2017-11-20 NOTE — PROGRESS NOTES
Bedside shift change report given to Rcia Harris (oncoming nurse) by Iram Salazar RN (offgoing nurse). Report included the following information SBAR, Kardex, ED Summary, Procedure Summary, Intake/Output, MAR and Accordion.

## 2017-11-21 LAB
GLUCOSE BLD STRIP.AUTO-MCNC: 88 MG/DL (ref 65–100)
SERVICE CMNT-IMP: NORMAL

## 2017-11-21 PROCEDURE — 74011250636 HC RX REV CODE- 250/636: Performed by: INTERNAL MEDICINE

## 2017-11-21 PROCEDURE — 77030018786 HC NDL GD F/USND BARD -B

## 2017-11-21 PROCEDURE — 74011000258 HC RX REV CODE- 258: Performed by: INTERNAL MEDICINE

## 2017-11-21 PROCEDURE — 65270000029 HC RM PRIVATE

## 2017-11-21 PROCEDURE — 74011250637 HC RX REV CODE- 250/637: Performed by: INTERNAL MEDICINE

## 2017-11-21 PROCEDURE — 82962 GLUCOSE BLOOD TEST: CPT

## 2017-11-21 PROCEDURE — 77030018719 HC DRSG PTCH ANTIMIC J&J -A

## 2017-11-21 PROCEDURE — C1751 CATH, INF, PER/CENT/MIDLINE: HCPCS

## 2017-11-21 PROCEDURE — 76937 US GUIDE VASCULAR ACCESS: CPT

## 2017-11-21 PROCEDURE — 74011250637 HC RX REV CODE- 250/637: Performed by: HOSPITALIST

## 2017-11-21 PROCEDURE — 36569 INSJ PICC 5 YR+ W/O IMAGING: CPT | Performed by: INTERNAL MEDICINE

## 2017-11-21 PROCEDURE — 02HV33Z INSERTION OF INFUSION DEVICE INTO SUPERIOR VENA CAVA, PERCUTANEOUS APPROACH: ICD-10-PCS | Performed by: INTERNAL MEDICINE

## 2017-11-21 PROCEDURE — C1894 INTRO/SHEATH, NON-LASER: HCPCS

## 2017-11-21 RX ORDER — SODIUM CHLORIDE 0.9 % (FLUSH) 0.9 %
10 SYRINGE (ML) INJECTION AS NEEDED
Status: DISCONTINUED | OUTPATIENT
Start: 2017-11-21 | End: 2017-11-22 | Stop reason: HOSPADM

## 2017-11-21 RX ORDER — HEPARIN 100 UNIT/ML
300 SYRINGE INTRAVENOUS AS NEEDED
Status: DISCONTINUED | OUTPATIENT
Start: 2017-11-21 | End: 2017-11-22 | Stop reason: HOSPADM

## 2017-11-21 RX ORDER — SODIUM CHLORIDE 0.9 % (FLUSH) 0.9 %
10-30 SYRINGE (ML) INJECTION AS NEEDED
Status: DISCONTINUED | OUTPATIENT
Start: 2017-11-21 | End: 2017-11-22 | Stop reason: HOSPADM

## 2017-11-21 RX ORDER — SODIUM CHLORIDE 0.9 % (FLUSH) 0.9 %
10-40 SYRINGE (ML) INJECTION EVERY 8 HOURS
Status: DISCONTINUED | OUTPATIENT
Start: 2017-11-21 | End: 2017-11-22 | Stop reason: HOSPADM

## 2017-11-21 RX ORDER — SODIUM CHLORIDE 0.9 % (FLUSH) 0.9 %
20 SYRINGE (ML) INJECTION AS NEEDED
Status: DISCONTINUED | OUTPATIENT
Start: 2017-11-21 | End: 2017-11-22 | Stop reason: HOSPADM

## 2017-11-21 RX ORDER — SODIUM CHLORIDE 0.9 % (FLUSH) 0.9 %
10 SYRINGE (ML) INJECTION EVERY 24 HOURS
Status: DISCONTINUED | OUTPATIENT
Start: 2017-11-21 | End: 2017-11-22 | Stop reason: HOSPADM

## 2017-11-21 RX ORDER — FUROSEMIDE 10 MG/ML
40 INJECTION INTRAMUSCULAR; INTRAVENOUS ONCE
Status: COMPLETED | OUTPATIENT
Start: 2017-11-21 | End: 2017-11-21

## 2017-11-21 RX ADMIN — MEROPENEM 500 MG: 500 INJECTION, POWDER, FOR SOLUTION INTRAVENOUS at 13:54

## 2017-11-21 RX ADMIN — MEROPENEM 500 MG: 500 INJECTION, POWDER, FOR SOLUTION INTRAVENOUS at 21:03

## 2017-11-21 RX ADMIN — TRAZODONE HYDROCHLORIDE 100 MG: 50 TABLET ORAL at 21:02

## 2017-11-21 RX ADMIN — Medication 10 ML: at 17:23

## 2017-11-21 RX ADMIN — FAMOTIDINE 20 MG: 20 TABLET, FILM COATED ORAL at 21:01

## 2017-11-21 RX ADMIN — HYDROCODONE BITARTRATE AND ACETAMINOPHEN 1 TABLET: 5; 325 TABLET ORAL at 15:50

## 2017-11-21 RX ADMIN — MEROPENEM 500 MG: 500 INJECTION, POWDER, FOR SOLUTION INTRAVENOUS at 05:32

## 2017-11-21 RX ADMIN — HYDROCODONE BITARTRATE AND ACETAMINOPHEN 1 TABLET: 5; 325 TABLET ORAL at 22:41

## 2017-11-21 RX ADMIN — Medication 10 ML: at 13:54

## 2017-11-21 RX ADMIN — FLUOXETINE 40 MG: 20 CAPSULE ORAL at 08:10

## 2017-11-21 RX ADMIN — FUROSEMIDE 40 MG: 10 INJECTION, SOLUTION INTRAMUSCULAR; INTRAVENOUS at 17:24

## 2017-11-21 RX ADMIN — Medication 1 CAPSULE: at 08:10

## 2017-11-21 RX ADMIN — MORPHINE SULFATE 1 MG: 2 INJECTION, SOLUTION INTRAMUSCULAR; INTRAVENOUS at 18:18

## 2017-11-21 RX ADMIN — MORPHINE SULFATE 1 MG: 2 INJECTION, SOLUTION INTRAMUSCULAR; INTRAVENOUS at 13:53

## 2017-11-21 RX ADMIN — MORPHINE SULFATE 1 MG: 2 INJECTION, SOLUTION INTRAMUSCULAR; INTRAVENOUS at 09:40

## 2017-11-21 RX ADMIN — Medication 10 ML: at 21:04

## 2017-11-21 RX ADMIN — MORPHINE SULFATE 1 MG: 2 INJECTION, SOLUTION INTRAMUSCULAR; INTRAVENOUS at 22:42

## 2017-11-21 RX ADMIN — Medication 10 ML: at 05:21

## 2017-11-21 RX ADMIN — HYDROCODONE BITARTRATE AND ACETAMINOPHEN 1 TABLET: 5; 325 TABLET ORAL at 08:10

## 2017-11-21 RX ADMIN — Medication 10 ML: at 21:03

## 2017-11-21 RX ADMIN — MORPHINE SULFATE 1 MG: 2 INJECTION, SOLUTION INTRAMUSCULAR; INTRAVENOUS at 05:20

## 2017-11-21 RX ADMIN — LEVOTHYROXINE SODIUM 50 MCG: 50 TABLET ORAL at 05:18

## 2017-11-21 RX ADMIN — FAMOTIDINE 20 MG: 20 TABLET, FILM COATED ORAL at 08:10

## 2017-11-21 RX ADMIN — ENOXAPARIN SODIUM 40 MG: 40 INJECTION SUBCUTANEOUS at 21:02

## 2017-11-21 NOTE — PROGRESS NOTES
HENRIQUE contacted [facility] to inquire if meds is covered 100% and Griselda Knowles said pt meds are still covered. Griselda Knowles stated she would come to the hospital to do the teaching tomorrow. HENRIQUE will continue to follow and assist with additional discharge needs.     John Thao, HUMA  Ext [phone]

## 2017-11-21 NOTE — PROGRESS NOTES
1900--bedside report received from [address] Chanel rn. Pt sitting up in bed oriented x 4. C/o flank and right breat pain from \"i pulled a muscle in breast when I went to pick something up\" (MAR). No other complaints at this time. Call bell in reach. Assessment as noted. 2200--pt given warm pack for right breast muscle pain, pt reports relief. 0530--pt awake oriented x 4, 1mg morphine given for flank pain, call bell in reach.     0700--bedside report given to [address] Chanel rn who is assuming care of pt

## 2017-11-21 NOTE — PROGRESS NOTES
Problem: Falls - Risk of  Goal: *Absence of Falls  Document Jocelyn Fall Risk and appropriate interventions in the flowsheet.    Outcome: Progressing Towards Goal  Fall Risk Interventions:  Mobility Interventions: Patient to call before getting OOB    Mentation Interventions: Adequate sleep, hydration, pain control    Medication Interventions: Teach patient to arise slowly    Elimination Interventions: Call light in reach    History of Falls Interventions: Consult care management for discharge planning

## 2017-11-21 NOTE — ROUTINE PROCESS
Bedside and Verbal shift change report given to DeAkimmyel RN (oncoming nurse) by Abdoulaye Wadsworth (offgoing nurse). Report included the following information SBAR, Kardex, Intake/Output, MAR and Recent Results.

## 2017-11-21 NOTE — PROGRESS NOTES
Nutrition LOS Screen  LOS: 5    Assessment:  Chart reviewed, medically noted for PMH shown below. No weight loss PTA. Will conduct a full assessment in 3-5 days    Past Medical History:   Diagnosis Date    Anemia     Anxiety     Arthritis     osteoarthritis    Autoimmune disease ([address]     Bowel trouble     Burning with urination     Chronic pain     Depression     Dyspepsia and other specified disorders of function of stomach     Headache     Hypothyroid     Osteoporosis        Diet Order:  Regular    % Eaten:  Patient Vitals for the past 72 hrs:   % Diet Eaten   11/20/17 1100 100 %     Pertinent Medications: [x]Reviewed: pepcid, lactobac,   Pertinent Labs: [x]Reviewed:   Food Allergies: [x]NKFA  []Other   Last BM: 11/19  Edema:        [x]RUE 1+  [x]LUE 1+  [x]RLE 2+ pitting  [x]LLE   2+ pitting   Pressure Ulcer:      [] Stage I   [] Stage II   [] Stage III   [] Stage IV      Wt Readings from Last 30 Encounters:   11/21/17 89 kg (196 lb 3.2 oz)   09/17/17 78.5 kg (173 lb 1 oz)   05/10/17 79.4 kg (175 lb)   11/22/16 68.5 kg (151 lb 0.2 oz)   06/11/16 58.2 kg (128 lb 3.2 oz)   09/21/15 64.9 kg (143 lb)   08/31/15 63.1 kg (139 lb 1.8 oz)   08/27/15 65.8 kg (145 lb 1 oz)   08/25/15 66.9 kg (147 lb 8 oz)   12/29/14 66.3 kg (146 lb 2 oz)   12/15/14 63 kg (139 lb)       Anthropometrics:   Height: 5' 4.17\" (163 cm) Weight: 89 kg (196 lb 3.2 oz)     BMI: Body mass index is 33.5 kg/(m^2).     This BMI is indicative of:   []Underweight    []Normal    []Overweight    [x] Obesity   [] Extreme Obesity (BMI>40)       Eda Perea RDN  Pager: [phone]

## 2017-11-21 NOTE — PROGRESS NOTES
Pharmacy Automatic Renal Dosing Protocol - Antimicrobials     Indication for Antimicrobials: UTI      Current Regimen of Each Antimicrobial:  Meropenem 500 mg IV Q8H -Started   Day 4     Previous Abx:  Zosyn 3.375 gm every 8 hours (Start Date ; Day # 2)     Microbiology Results (Current encounter)   Date/Time Order Name Specimen Source Lab Status   17 1751 CULTURE, URINE Clean catch Final result   17 1751 CULTURE, BLOOD Blood Preliminary result   17 1744 CULTURE, BLOOD Blood Preliminary result     Estimated Creatinine Clearance: 162.2 mL/min (based on Cr of 0.47). Estimated Creatinine Clearance (using IBW): 130.1 mL/min  Recent Labs      17   0544   CREA  0.47*   BUN  16   NA  142   K  4.2   CA  7.6*     Temp (24hrs), Av °F (36.7 °C), Min:97.4 °F (36.3 °C), Max:98.3 °F (36.8 °C)    No results found for: PCT     Impression/Plan:   Continue UTI ESBL coverage with Meropenem 500mg IV q8h  Recommend 5 days therapy which could stop after  doses to complete appropriate antibiotic regimen (no pyelonephritis). Fosfomycin if sensitive would provide additional day of ORAL therapy if IV abx are discontinued today day 4. No fosfomycin sensitivity reported on lab inquiry presently. Pharmacy will follow daily and adjust medications as appropriate for renal function and/or serum levels.     Thank you,  Renee Rosenthal, [facility]

## 2017-11-21 NOTE — PROGRESS NOTES
Hospitalist Progress Note    NAME: Malina Mejia   :  1970   MRN:  [id number]       Assessment / Plan:    Severe sepsis POA with   ESBL E coli  UTI POA  Lactic acidosis POA  Clinically had pyelo despite CT findings, now feels better  Urine cx grew ESBL  Day 4 meropenem   Blood cx NGTD. Lactate is normal   Plan for IV meropenem x 10 days, PICC line  Hopefully discharge in AM    Reported weight again and feeling of puffiness  S/P one dose of lasix iv 1, give 1 more tonight    Migraine headache  S/p Toradol with improvement of headache. Cystocele/bladder prolapse POA  Urology OP follow up for repair to prevent recurrent UTIs     Hypertension on nebivolol  Hypothyroidism on levothyroxine  Anxiety disorder  Continue home meds    Obesity POA   Body mass index is 33.5 kg/(m^2).     Code Status: Full  Surrogate Decision Maker: Adorno Roger # [phone]     DVT Prophylaxis: Sq lovenox  GI Prophylaxis: not indicated     Baseline: pt is independent at home with ADLs    Recommended Disposition: Home based on phospho mycin testing vs iv abx. Subjective:     Chief Complaint / Reason for Physician Visit  Still feel a bit swollen. No headache. No diarrhea. Nausea and fatigue at admit much improved, urine less foul smelling  No dysuria or urgency. Less flank pain, worse on right than left    Review of Systems:  Symptom Y/N Comments  Symptom Y/N Comments   Fever/Chills n   Chest Pain n    Poor Appetite n   Edema     Cough n   Abdominal Pain n    Sputum    Joint Pain y    SOB/RIZO n   Pruritis/Rash     Nausea/vomit n   Tolerating PT/OT     Diarrhea n   Tolerating Diet     Constipation    Other y  Fatigue      Could NOT obtain due to:      Objective:     VITALS:   Last 24hrs VS reviewed since prior progress note.  Most recent are:  Patient Vitals for the past 24 hrs:   Temp Pulse Resp BP SpO2   17 1418 98.2 °F (36.8 °C) 69 17 106/61 95 %   17 1046 98.2 °F (36.8 °C) 83 18 121/67 96 %   17 0811 - 70 - 107/61 -   11/21/17 0530 - 75 18 117/61 -   11/20/17 2210 98.1 °F (36.7 °C) 77 18 94/58 95 %   11/20/17 1935 98.3 °F (36.8 °C) 89 18 109/62 96 %   11/20/17 1503 97.4 °F (36.3 °C) 96 18 114/69 97 %       Intake/Output Summary (Last 24 hours) at 11/21/17 1441  Last data filed at 11/21/17 0540   Gross per 24 hour   Intake              500 ml   Output                0 ml   Net              500 ml        PHYSICAL EXAM:  General: cooperative, no acute distress    EENT:  EOMI. Anicteric sclerae. MMM  Resp:  CTA bilaterally, no wheezing or rales. No accessory muscle use  CV:  Regular  rhythm,  No edema  GI:  Soft, rt flank tenderness, no guarding or rigidity  Neurologic:  Alert and oriented X 3, normal speech,   Psych:   Good insight. Not anxious nor agitated  Skin:  No rashes. No jaundice    Reviewed most current lab test results and cultures  YES  Reviewed most current radiology test results   YES  Review and summation of old records today    NO  Reviewed patient's current orders and MAR    YES  PMH/ reviewed - no change compared to H&P    ________________________________________________________________________  Care Plan discussed with:    Comments   Patient y    Family      RN y    Care Manager y    Consultant                        Multidiciplinary team rounds were held today with , nursing, pharmacist and clinical coordinator. Patient's plan of care was discussed; medications were reviewed and discharge planning was addressed.      ________________________________________________________________________  Total NON critical care TIME:  25    Minutes    Total CRITICAL CARE TIME Spent:   Minutes non procedure based      Comments   >50% of visit spent in counseling and coordination of care     ________________________________________________________________________  David Arriaga MD     Procedures: see electronic medical records for all procedures/Xrays and details which were not copied into this note but were reviewed prior to creation of Plan. LABS:  I reviewed today's most current labs and imaging studies. Pertinent labs include:  No results for input(s): WBC, HGB, HCT, PLT, HGBEXT, HCTEXT, PLTEXT, HGBEXT, HCTEXT, PLTEXT in the last 72 hours.   Recent Labs      11/20/17   0544   NA  142   K  4.2   CL  107   CO2  32   GLU  79   BUN  16   CREA  0.47*   CA  7.6*       Signed: Curtis Babcock MD

## 2017-11-21 NOTE — PROGRESS NOTES
PICC (Peripherally Inserted Central Catheter) line insertion  procedure note :     Procedure explained to patient along with risks and benefits  and patient agreed to proceed. Informed consent obtained from  Patient  Marlys Brenner. Patient teaching completed. Timeout completed. Pre-procedure assessment done. Maximum sterile barrier precautions observed throughout procedure. Lidocaine 1%  5    ml sq given prior to cannulation. Cannulated basilic  vein using ultrasound guidance and modified seldinger technique. Inserted 5  Uzbek dual  lumen PICC to right arm using [place] Tip Location System and  [address]. Pt has    sinus   rhythm. PICC tip location was confirmed by 3 CG tip positioning system, indicating tall P wave and no negative deflection before P wave which would indicate that the PICC tip is properly placed in the distal SVC or at the [place]. PICC tip location was  confirmed by 2 PICC nurses and 3CG printout placed on patient's chart. Blood return verified and flushed with 20 ml normal saline in each port. Sterile dressing applied with biopatch, statLock and occlusive dressing as per protocol. Curos caps applied to each port. Patient tolerated procedure well with minimal blood loss ( less than 5 ml.)   Patient education material provided. PICC procedure performed by  : Trae Loredo RN. PICC nurse  Assisted by :   Judge Rosalia BOSS  PICC nurse  Reason for access : reliable access / MD order /    Jovanny haas IV antibiotic medication administration  Complications related to insertion  : none  X-Ray : not applicable  Notified primary nurse  Abi Pandey RN  that  PICC line can be used.    Total Trimmed Length :  38   cm   External Length : 0  cm   PICC line site arm circumference:  34    cm   PICC catheter occupies  14   % of vein  Type of PICC: Bard Solo Power PICC   Ref # :      [id number]    Lot # :  [id number]   Expiration Date :    2018-11-30     German Doyle Andee Barthel RN PICC Nurse, Vascular Access Team.

## 2017-11-21 NOTE — INTERDISCIPLINARY ROUNDS
Bedside interdisciplinary rounds were held today to discuss patient plan of care and outcomes. The following members were present: Physician, Nurse, Clinical Care Leader, Pharmacy, Physical Therapy, and Case Management. Plan:  Await results of Phosphomycin E test to determine outpatient antibiotic plan.

## 2017-11-21 NOTE — PROGRESS NOTES
Spoke with Dr Lola Burden regarding PICC order. Earlier note on rounds stated awaiting Phosphmycin E results to decide Antibiotic coverage. Dr Lola Burden states Pt will need 5 days of Meropenem S/P discharge. Will Place PICC today.   Magdy Snell RN  Vascular Access Team

## 2017-11-22 VITALS
BODY MASS INDEX: 32.71 KG/M2 | DIASTOLIC BLOOD PRESSURE: 62 MMHG | HEIGHT: 64 IN | RESPIRATION RATE: 16 BRPM | SYSTOLIC BLOOD PRESSURE: 103 MMHG | TEMPERATURE: 97.7 F | OXYGEN SATURATION: 95 % | HEART RATE: 94 BPM | WEIGHT: 191.58 LBS

## 2017-11-22 LAB
ANION GAP SERPL CALC-SCNC: 4 MMOL/L (ref 5–15)
BACTERIA SPEC CULT: NORMAL
BACTERIA SPEC CULT: NORMAL
BASOPHILS # BLD: 0 K/UL (ref 0–0.1)
BASOPHILS NFR BLD: 0 % (ref 0–1)
BUN SERPL-MCNC: 17 MG/DL (ref 6–20)
BUN/CREAT SERPL: 26 (ref 12–20)
CALCIUM SERPL-MCNC: 8.1 MG/DL (ref 8.5–10.1)
CHLORIDE SERPL-SCNC: 102 MMOL/L (ref 97–108)
CO2 SERPL-SCNC: 33 MMOL/L (ref 21–32)
CREAT SERPL-MCNC: 0.65 MG/DL (ref 0.55–1.02)
EOSINOPHIL # BLD: 0.3 K/UL (ref 0–0.4)
EOSINOPHIL NFR BLD: 7 % (ref 0–7)
ERYTHROCYTE [DISTWIDTH] IN BLOOD BY AUTOMATED COUNT: 13.3 % (ref 11.5–14.5)
GLUCOSE SERPL-MCNC: 59 MG/DL (ref 65–100)
HCT VFR BLD AUTO: 36.7 % (ref 35–47)
HGB BLD-MCNC: 11.5 G/DL (ref 11.5–16)
INR PPP: 1 (ref 0.9–1.1)
LYMPHOCYTES # BLD: 1.1 K/UL (ref 0.8–3.5)
LYMPHOCYTES NFR BLD: 26 % (ref 12–49)
MCH RBC QN AUTO: 31.1 PG (ref 26–34)
MCHC RBC AUTO-ENTMCNC: 31.3 G/DL (ref 30–36.5)
MCV RBC AUTO: 99.2 FL (ref 80–99)
MONOCYTES # BLD: 0.5 K/UL (ref 0–1)
MONOCYTES NFR BLD: 12 % (ref 5–13)
NEUTS SEG # BLD: 2.4 K/UL (ref 1.8–8)
NEUTS SEG NFR BLD: 55 % (ref 32–75)
PLATELET # BLD AUTO: 157 K/UL (ref 150–400)
POTASSIUM SERPL-SCNC: 3.9 MMOL/L (ref 3.5–5.1)
PROTHROMBIN TIME: 10 SEC (ref 9–11.1)
RBC # BLD AUTO: 3.7 M/UL (ref 3.8–5.2)
SERVICE CMNT-IMP: NORMAL
SERVICE CMNT-IMP: NORMAL
SODIUM SERPL-SCNC: 139 MMOL/L (ref 136–145)
WBC # BLD AUTO: 4.3 K/UL (ref 3.6–11)

## 2017-11-22 PROCEDURE — 74011250637 HC RX REV CODE- 250/637: Performed by: INTERNAL MEDICINE

## 2017-11-22 PROCEDURE — 74011250636 HC RX REV CODE- 250/636: Performed by: INTERNAL MEDICINE

## 2017-11-22 PROCEDURE — 85610 PROTHROMBIN TIME: CPT | Performed by: INTERNAL MEDICINE

## 2017-11-22 PROCEDURE — 74011000258 HC RX REV CODE- 258: Performed by: INTERNAL MEDICINE

## 2017-11-22 PROCEDURE — 36415 COLL VENOUS BLD VENIPUNCTURE: CPT | Performed by: INTERNAL MEDICINE

## 2017-11-22 PROCEDURE — 85025 COMPLETE CBC W/AUTO DIFF WBC: CPT | Performed by: INTERNAL MEDICINE

## 2017-11-22 PROCEDURE — 80048 BASIC METABOLIC PNL TOTAL CA: CPT | Performed by: INTERNAL MEDICINE

## 2017-11-22 PROCEDURE — 74011250637 HC RX REV CODE- 250/637: Performed by: HOSPITALIST

## 2017-11-22 RX ORDER — OXYCODONE AND ACETAMINOPHEN 5; 325 MG/1; MG/1
1 TABLET ORAL
Qty: 20 TAB | Refills: 0 | Status: SHIPPED | OUTPATIENT
Start: 2017-11-22 | End: 2018-01-29

## 2017-11-22 RX ORDER — FUROSEMIDE 10 MG/ML
40 INJECTION INTRAMUSCULAR; INTRAVENOUS ONCE
Status: COMPLETED | OUTPATIENT
Start: 2017-11-22 | End: 2017-11-22

## 2017-11-22 RX ADMIN — MORPHINE SULFATE 1 MG: 2 INJECTION, SOLUTION INTRAMUSCULAR; INTRAVENOUS at 02:26

## 2017-11-22 RX ADMIN — HYDROCODONE BITARTRATE AND ACETAMINOPHEN 1 TABLET: 5; 325 TABLET ORAL at 11:21

## 2017-11-22 RX ADMIN — MEROPENEM 500 MG: 500 INJECTION, POWDER, FOR SOLUTION INTRAVENOUS at 04:53

## 2017-11-22 RX ADMIN — FLUOXETINE 40 MG: 20 CAPSULE ORAL at 08:28

## 2017-11-22 RX ADMIN — Medication 10 ML: at 02:26

## 2017-11-22 RX ADMIN — LEVOTHYROXINE SODIUM 50 MCG: 50 TABLET ORAL at 07:30

## 2017-11-22 RX ADMIN — MORPHINE SULFATE 1 MG: 2 INJECTION, SOLUTION INTRAMUSCULAR; INTRAVENOUS at 06:50

## 2017-11-22 RX ADMIN — FUROSEMIDE 40 MG: 10 INJECTION, SOLUTION INTRAMUSCULAR; INTRAVENOUS at 08:39

## 2017-11-22 RX ADMIN — HYDROCODONE BITARTRATE AND ACETAMINOPHEN 1 TABLET: 5; 325 TABLET ORAL at 04:54

## 2017-11-22 RX ADMIN — Medication 10 ML: at 06:31

## 2017-11-22 RX ADMIN — Medication 10 ML: at 06:32

## 2017-11-22 RX ADMIN — NEBIVOLOL HYDROCHLORIDE 5 MG: 2.5 TABLET ORAL at 08:28

## 2017-11-22 RX ADMIN — Medication 1 CAPSULE: at 08:28

## 2017-11-22 RX ADMIN — ALUMINUM HYDROXIDE AND MAGNESIUM HYDROXIDE 15 ML: 200; 200 SUSPENSION ORAL at 08:39

## 2017-11-22 RX ADMIN — FAMOTIDINE 20 MG: 20 TABLET, FILM COATED ORAL at 08:28

## 2017-11-22 NOTE — ROUTINE PROCESS
Bedside and Verbal shift change report given to [facility] (oncoming nurse) by Aleyda Jorgensen (offgoing nurse). Report included the following information SBAR, Kardex, Intake/Output, MAR, Recent Results and Cardiac Rhythm NSR.

## 2017-11-22 NOTE — DISCHARGE SUMMARY
Hospitalist Discharge Note    NAME: Diana Pacheco   :  1970   MRN:  [id number]       Admit date: 2017    Discharge date: 17    PCP: Branden Correia MD    Discharge Diagnoses:    Severe sepsis POA with RR 22, HR 99, lactate 2.3    ESBL-producing E. coli pyelonephritis POA    Lactic acidosis POA lactate 2.3    Reported weight again and feeling of puffiness    Migraine headache    Cystocele/bladder prolapse POA     Hypertension on nebivolol    Hypothyroidism on levothyroxine    Anxiety disorder    Obesity POA  Body mass index is 33.5 kg/(m^2).     Code Status: Full    Discharge Medications:  Current Discharge Medication List      START taking these medications    Details   meropenem 500 mg, ADDaptor 1 Device IVPB 500 mg by IntraVENous route every eight (8) hours for 5 days. Qty: 15 Dose, Refills: 0      oxyCODONE-acetaminophen (PERCOCET) 5-325 mg per tablet Take 1 Tab by mouth every six (6) hours as needed for Pain. Max Daily Amount: 4 Tabs. Qty: 20 Tab, Refills: 0         CONTINUE these medications which have NOT CHANGED    Details   traZODone (DESYREL) 100 mg tablet Take 100 mg by mouth nightly as needed for Sleep.      naltrexone (DEPADE) 50 mg tablet Take 50 mg by mouth daily as needed. Indications: Alcoholism      hydrOXYzine HCl (ATARAX) 25 mg tablet Take 25 mg by mouth daily as needed for Anxiety. FLUoxetine (PROZAC) 40 mg capsule Take 40 mg by mouth daily. ASPIRIN/SALICYLAMIDE/CAFFEINE (BC HEADACHE POWDER PO) Take 1 Packet by mouth daily as needed (headache). nebivolol (BYSTOLIC) 5 mg tablet Take 5 mg by mouth daily. levothyroxine (SYNTHROID) 50 mcg tablet Take 50 mcg by mouth Daily (before breakfast). ergocalciferol (VITAMIN D2) 50,000 unit capsule Take 50,000 Units by mouth Every Thursday. furosemide (LASIX) 20 mg tablet Take 20 mg by mouth daily as needed (fluid retention).          STOP taking these medications       COPPER PO Comments:   Reason for Stopping:         cefixime (SUPRAX) 400 mg capsule Comments:   Reason for Stopping:         trimethoprim-sulfamethoxazole (BACTRIM DS) 160-800 mg per tablet Comments:   Reason for Stopping: Follow-up Information     Follow up With Details Comments Contact Info    Branden Correia MD Schedule an appointment as soon as possible for a visit in 1 week  [address] [place] [address]  [phone]            Time spent on discharge:   I spent greater than 30 minutes on discharge, seeing and examining the patient, reconciling home meds and new meds, coordinating care with case management, doing the discharge papers and the D/C summary    Discharge disposition: home with home health    Discharge Condition: Stable    Summary of admission H+P(copied from Dr Gabriella Chakraborty Note):     CHIEF COMPLAINT: Worsening bilateral flank/abdominal pain x 1 week now     HISTORY OF PRESENT ILLNESS:     Carlos Lacey is a 55 y.o.  female who presents with above complains from home ambulatory. Pt presents with CC of worsening Abdominal pain/flank pain x 1 week. H/o recently completing almost 7 days of Bactrim for UTI as per PCP  H/o prolapsed Bladder causing recurrent UTIs including ESB +ve Ecoli UTI in 5/2017. Pt was found to have  Nitrite +ve UA in ER with lactate of 2.3 in ER     We were asked to admit for work up and evaluation of the above problems.           Past Medical History:   Diagnosis Date    Anemia      Anxiety      Arthritis       osteoarthritis    Autoimmune disease ([address]      Bowel trouble      Burning with urination      Chronic pain      Depression      Dyspepsia and other specified disorders of function of stomach      Headache      Hypothyroid      Osteoporosis        pCXR FINDINGS:  AP portable view of the chest demonstrates a normal cardiomediastinal  silhouette. The lungs are adequately expanded. There is no edema, effusion,  consolidation, or pneumothorax.   The osseous structures are unremarkable. IMPRESSION:  No acute process.      Abdomen/pelvis CT scan FINDINGS:   LUNG BASES: No abnormality. LIVER: No mass or biliary dilatation. GALLBLADDER: Surgically absent with postoperative biliary prominence. SPLEEN: No enlargement or lesion. PANCREAS: No mass or ductal dilatation. ADRENALS: No mass. KIDNEYS: No mass, calculus, or hydronephrosis. GI TRACT:  Remote gastric bypass. No evidence of bowel obstruction, unchanged  prominent distal jejunal bypass loop  PERITONEUM: No free air or free fluid. APPENDIX: Unremarkable. RETROPERITONEUM: No lymphadenopathy or aortic aneurysm. ADDITIONAL COMMENTS: N/A.  URINARY BLADDER: Unremarkable. REPRODUCTIVE ORGANS: Surgically absent. LYMPH NODES:  None enlarged. FREE FLUID:  None. BONES: No destructive bone lesion. ADDITIONAL COMMENTS: N/A. IMPRESSION:  Remote gastric bypass. No acute abnormality. Hospital course:       Severe sepsis POA with RR 22, HR 99, lactate 2.3  ESBL-producing E. coli pyelonephritis POA  Lactic acidosis POA lactate 2.3  Clinically had pyelo despite negative CT findings, now feels better  Urine cx grew ESBL-producing E coli  Day 5 meropenem, will complete 10 days, home health antibiotics being set up  Blood cultures negative  Lactate normalized with IVF   PICc line placed, importance of getting it removed ASAP once antibiotics completed d/w pt. Do not think fosfomycin would be appropriate given the pyelonephritis  D/C to home    Reported weight again and feeling of puffiness  S/P one dose of lasix iv 1, give 1 more today  Uses PRN lasix at home    Migraine headache  S/p Toradol with improvement of headache.     Cystocele/bladder prolapse POA  Urology OP follow up for repair to prevent recurrent UTIs     Hypertension on nebivolol  Hypothyroidism on levothyroxine  Anxiety disorder  Continue home meds    Obesity POA  Body mass index is 33.5 kg/(m^2).     Code Status: Full  Surrogate Decision Maker:  Hailee Holguin # [phone]     DVT Prophylaxis: Sq lovenox  GI Prophylaxis: not indicated     Baseline: pt is independent at home with ADLs    Recommended Disposition: Home with home health     Subjective:     Chief Complaint / Reason for Physician Visit  \"I am still swollen\"   No headache. No diarrhea. No more N/V  Still mildly sore in flanks, but much improved  No dysuria or urgency. PICC line placed last PM, feels ready to go home    Review of Systems:  Symptom Y/N Comments  Symptom Y/N Comments   Fever/Chills n   Chest Pain n    Poor Appetite n   Edema     Cough n   Abdominal Pain n    Sputum    Joint Pain n    SOB/RIZO n   Pruritis/Rash     Nausea/vomit n   Tolerating PT/OT     Diarrhea n   Tolerating Diet     Constipation    Other y  Fatigue      Could NOT obtain due to:      Objective:     VITALS:   Last 24hrs VS reviewed since prior progress note. Most recent are:  Patient Vitals for the past 24 hrs:   Temp Pulse Resp BP SpO2   11/22/17 0551 - 94 16 103/62 95 %   11/22/17 0208 97.7 °F (36.5 °C) 88 16 106/60 95 %   11/21/17 2233 98.1 °F (36.7 °C) 76 18 111/76 98 %   11/21/17 1828 98 °F (36.7 °C) 87 17 107/69 99 %   11/21/17 1418 98.2 °F (36.8 °C) 69 17 106/61 95 %   11/21/17 1046 98.2 °F (36.8 °C) 83 18 121/67 96 %       Intake/Output Summary (Last 24 hours) at 11/22/17 0846  Last data filed at 11/21/17 2322   Gross per 24 hour   Intake              250 ml   Output                0 ml   Net              250 ml        PHYSICAL EXAM:  General: cooperative, no acute distress    EENT:  EOMI. Anicteric sclerae. MMM  Resp:  CTA bilaterally, no wheezing or rales. No accessory muscle use  CV:  Regular  rhythm,  No edema  GI:  Soft, non tender, no guarding or rigidity, positive BS, no CVA tenderness  Neurologic:  Alert and oriented X 3, normal speech,   Psych:   Good insight. Not anxious nor agitated  Skin:  No rashes.   No jaundice    Reviewed most current lab test results and cultures  YES  Reviewed most current radiology test results   YES  Review and summation of old records today    NO  Reviewed patient's current orders and MAR    YES  PMH/SH reviewed - no change compared to H&P    ________________________________________________________________________  Care Plan discussed with:    Comments   Patient y    Family      RN y    Care Manager y    Consultant                        Multidiciplinary team rounds were held today with , nursing, pharmacist and clinical coordinator. Patient's plan of care was discussed; medications were reviewed and discharge planning was addressed. ________________________________________________________________________        Comments   >50% of visit spent in counseling and coordination of care     ________________________________________________________________________  Alvaro Flannery MD     Procedures: see electronic medical records for all procedures/Xrays and details which were not copied into this note but were reviewed prior to creation of Plan. LABS:  I reviewed today's most current labs and imaging studies.   Pertinent labs include:  Recent Labs      11/22/17 0221   WBC  4.3   HGB  11.5   HCT  36.7   PLT  157     Recent Labs      11/22/17 0221 11/20/17   0544   NA  139  142   K  3.9  4.2   CL  102  107   CO2  33*  32   GLU  59*  79   BUN  17  16   CREA  0.65  0.47*   CA  8.1*  7.6*   INR  1.0   --        Signed: Alvaro Flannery MD

## 2017-11-22 NOTE — PROGRESS NOTES
PICC site and dressing check 24 hours post insertion :     PICC line site and dressing clean, dry and intact. No complications noted. Mel Silverio RN. BSN. CMSRMAVIS. AMARA, PICC Nurse.  Vascular Access Team

## 2017-11-22 NOTE — PROGRESS NOTES
Bedside and Verbal shift change report given to [address] (oncoming nurse) by Delia Harada RN (offgoing nurse). Report included the following information SBAR, Kardex, Procedure Summary, Intake/Output, MAR and Recent Results.

## 2017-11-22 NOTE — PROGRESS NOTES
SW contacted William Veloz with [facility] to inform her of pt discharge. SW had to leave a VM message requesting a return call as pt is being discharge today. SW spoke with pt to inquire if she had [facility] in the past.  Pt stated she has not and would like [facility] at discharge. Referral sent to [facility] for their review. SW will continue to follow and assist with additional discharge needs. Late Entry:    William Veloz with [facility] provided teaching to the pt for IV ABX. Shelly with [facility] stated due to the length of time needed for the ABX, [place] will maintain HH orders and remove pt PICC. HENRIQUE provided William Meter with IV ABX order and PICC report prior to discharge. Family to transport at discharge.     HUMA Musa  Ext [phone]

## 2017-11-22 NOTE — PROGRESS NOTES
Bedside and Verbal shift change report given to KARYN May (oncoming nurse) by Deisi Rivera (offgoing nurse). Report included the following information SBAR, Kardex, ED Summary, Procedure Summary, Intake/Output, MAR, Recent Results and Med Rec Status.

## 2017-11-22 NOTE — PROGRESS NOTES
Problem: Falls - Risk of  Goal: *Absence of Falls  Document Jocelyn Fall Risk and appropriate interventions in the flowsheet.    Outcome: Progressing Towards Goal  Fall Risk Interventions:  Mobility Interventions: Bed/chair exit alarm    Mentation Interventions: More frequent rounding, Update white board, Adequate sleep, hydration, pain control    Medication Interventions: Teach patient to arise slowly    Elimination Interventions: Call light in reach    History of Falls Interventions: Consult care management for discharge planning

## 2018-01-29 ENCOUNTER — HOSPITAL ENCOUNTER (INPATIENT)
Age: 48
LOS: 3 days | Discharge: HOME | DRG: 690 | End: 2018-02-01
Attending: EMERGENCY MEDICINE | Admitting: INTERNAL MEDICINE
Payer: COMMERCIAL

## 2018-01-29 DIAGNOSIS — Z86.19 HISTORY OF ESBL E. COLI INFECTION: ICD-10-CM

## 2018-01-29 DIAGNOSIS — N30.00 ACUTE CYSTITIS WITHOUT HEMATURIA: Primary | ICD-10-CM

## 2018-01-29 DIAGNOSIS — F10.21 HISTORY OF ALCOHOL DEPENDENCE (HCC): ICD-10-CM

## 2018-01-29 LAB
ALBUMIN SERPL-MCNC: 3.8 G/DL (ref 3.5–5)
ALBUMIN/GLOB SERPL: 1.2 {RATIO} (ref 1.1–2.2)
ALP SERPL-CCNC: 172 U/L (ref 45–117)
ALT SERPL-CCNC: 49 U/L (ref 12–78)
AMPHET UR QL SCN: NEGATIVE
ANION GAP SERPL CALC-SCNC: 7 MMOL/L (ref 5–15)
APPEARANCE UR: ABNORMAL
AST SERPL-CCNC: 45 U/L (ref 15–37)
BACTERIA URNS QL MICRO: ABNORMAL /HPF
BARBITURATES UR QL SCN: NEGATIVE
BASOPHILS # BLD: 0 K/UL (ref 0–0.1)
BASOPHILS NFR BLD: 0 % (ref 0–1)
BENZODIAZ UR QL: NEGATIVE
BILIRUB SERPL-MCNC: 0.6 MG/DL (ref 0.2–1)
BILIRUB UR QL: NEGATIVE
BUN SERPL-MCNC: 7 MG/DL (ref 6–20)
BUN/CREAT SERPL: 12 (ref 12–20)
CALCIUM SERPL-MCNC: 8.8 MG/DL (ref 8.5–10.1)
CANNABINOIDS UR QL SCN: NEGATIVE
CHLORIDE SERPL-SCNC: 99 MMOL/L (ref 97–108)
CO2 SERPL-SCNC: 31 MMOL/L (ref 21–32)
COCAINE UR QL SCN: NEGATIVE
COLOR UR: ABNORMAL
CREAT SERPL-MCNC: 0.6 MG/DL (ref 0.55–1.02)
DIFFERENTIAL METHOD BLD: ABNORMAL
DRUG SCRN COMMENT,DRGCM: NORMAL
EOSINOPHIL # BLD: 0.1 K/UL (ref 0–0.4)
EOSINOPHIL NFR BLD: 1 % (ref 0–7)
EPITH CASTS URNS QL MICRO: ABNORMAL /LPF
ERYTHROCYTE [DISTWIDTH] IN BLOOD BY AUTOMATED COUNT: 12.1 % (ref 11.5–14.5)
ETHANOL SERPL-MCNC: <10 MG/DL
GLOBULIN SER CALC-MCNC: 3.1 G/DL (ref 2–4)
GLUCOSE SERPL-MCNC: 101 MG/DL (ref 65–100)
GLUCOSE UR STRIP.AUTO-MCNC: NEGATIVE MG/DL
HCT VFR BLD AUTO: 44.4 % (ref 35–47)
HGB BLD-MCNC: 14.6 G/DL (ref 11.5–16)
HGB UR QL STRIP: ABNORMAL
IMM GRANULOCYTES # BLD: 0 K/UL (ref 0–0.04)
IMM GRANULOCYTES NFR BLD AUTO: 0 % (ref 0–0.5)
KETONES UR QL STRIP.AUTO: ABNORMAL MG/DL
LACTATE SERPL-SCNC: 1.2 MMOL/L (ref 0.4–2)
LEUKOCYTE ESTERASE UR QL STRIP.AUTO: ABNORMAL
LYMPHOCYTES # BLD: 0.8 K/UL (ref 0.8–3.5)
LYMPHOCYTES NFR BLD: 14 % (ref 12–49)
MAGNESIUM SERPL-MCNC: 2.5 MG/DL (ref 1.6–2.4)
MCH RBC QN AUTO: 30 PG (ref 26–34)
MCHC RBC AUTO-ENTMCNC: 32.9 G/DL (ref 30–36.5)
MCV RBC AUTO: 91.4 FL (ref 80–99)
METHADONE UR QL: NEGATIVE
MONOCYTES # BLD: 0.3 K/UL (ref 0–1)
MONOCYTES NFR BLD: 6 % (ref 5–13)
NEUTS SEG # BLD: 4.3 K/UL (ref 1.8–8)
NEUTS SEG NFR BLD: 79 % (ref 32–75)
NITRITE UR QL STRIP.AUTO: POSITIVE
NRBC # BLD: 0 K/UL (ref 0–0.01)
NRBC BLD-RTO: 0 PER 100 WBC
OPIATES UR QL: NEGATIVE
PCP UR QL: NEGATIVE
PH UR STRIP: 6.5 [PH] (ref 5–8)
PLATELET # BLD AUTO: 183 K/UL (ref 150–400)
PMV BLD AUTO: 9.3 FL (ref 8.9–12.9)
POTASSIUM SERPL-SCNC: 3.8 MMOL/L (ref 3.5–5.1)
PROT SERPL-MCNC: 6.9 G/DL (ref 6.4–8.2)
PROT UR STRIP-MCNC: ABNORMAL MG/DL
RBC # BLD AUTO: 4.86 M/UL (ref 3.8–5.2)
RBC #/AREA URNS HPF: ABNORMAL /HPF (ref 0–5)
RBC MORPH BLD: ABNORMAL
SODIUM SERPL-SCNC: 137 MMOL/L (ref 136–145)
SP GR UR REFRACTOMETRY: 1.01 (ref 1–1.03)
UA: UC IF INDICATED,UAUC: ABNORMAL
UROBILINOGEN UR QL STRIP.AUTO: 1 EU/DL (ref 0.2–1)
WBC # BLD AUTO: 5.5 K/UL (ref 3.6–11)
WBC URNS QL MICRO: >100 /HPF (ref 0–4)

## 2018-01-29 PROCEDURE — 74011250636 HC RX REV CODE- 250/636: Performed by: INTERNAL MEDICINE

## 2018-01-29 PROCEDURE — 80053 COMPREHEN METABOLIC PANEL: CPT | Performed by: EMERGENCY MEDICINE

## 2018-01-29 PROCEDURE — 80307 DRUG TEST PRSMV CHEM ANLYZR: CPT | Performed by: PHYSICIAN ASSISTANT

## 2018-01-29 PROCEDURE — 83605 ASSAY OF LACTIC ACID: CPT | Performed by: PHYSICIAN ASSISTANT

## 2018-01-29 PROCEDURE — 87186 SC STD MICRODIL/AGAR DIL: CPT | Performed by: PHYSICIAN ASSISTANT

## 2018-01-29 PROCEDURE — 96375 TX/PRO/DX INJ NEW DRUG ADDON: CPT

## 2018-01-29 PROCEDURE — 81001 URINALYSIS AUTO W/SCOPE: CPT | Performed by: PHYSICIAN ASSISTANT

## 2018-01-29 PROCEDURE — 83735 ASSAY OF MAGNESIUM: CPT | Performed by: PHYSICIAN ASSISTANT

## 2018-01-29 PROCEDURE — 74011000258 HC RX REV CODE- 258: Performed by: PHYSICIAN ASSISTANT

## 2018-01-29 PROCEDURE — 74011250637 HC RX REV CODE- 250/637: Performed by: INTERNAL MEDICINE

## 2018-01-29 PROCEDURE — 74011250637 HC RX REV CODE- 250/637: Performed by: PHYSICIAN ASSISTANT

## 2018-01-29 PROCEDURE — 36415 COLL VENOUS BLD VENIPUNCTURE: CPT | Performed by: EMERGENCY MEDICINE

## 2018-01-29 PROCEDURE — 65270000015 HC RM PRIVATE ONCOLOGY

## 2018-01-29 PROCEDURE — 96365 THER/PROPH/DIAG IV INF INIT: CPT

## 2018-01-29 PROCEDURE — 87086 URINE CULTURE/COLONY COUNT: CPT | Performed by: PHYSICIAN ASSISTANT

## 2018-01-29 PROCEDURE — 74011250636 HC RX REV CODE- 250/636: Performed by: PHYSICIAN ASSISTANT

## 2018-01-29 PROCEDURE — 87077 CULTURE AEROBIC IDENTIFY: CPT | Performed by: PHYSICIAN ASSISTANT

## 2018-01-29 PROCEDURE — 99284 EMERGENCY DEPT VISIT MOD MDM: CPT

## 2018-01-29 PROCEDURE — 85025 COMPLETE CBC W/AUTO DIFF WBC: CPT | Performed by: EMERGENCY MEDICINE

## 2018-01-29 PROCEDURE — 74011000258 HC RX REV CODE- 258: Performed by: INTERNAL MEDICINE

## 2018-01-29 RX ORDER — ONDANSETRON 2 MG/ML
4 INJECTION INTRAMUSCULAR; INTRAVENOUS ONCE
Status: DISPENSED | OUTPATIENT
Start: 2018-01-29 | End: 2018-01-30

## 2018-01-29 RX ORDER — SODIUM CHLORIDE 9 MG/ML
50 INJECTION, SOLUTION INTRAVENOUS CONTINUOUS
Status: DISCONTINUED | OUTPATIENT
Start: 2018-01-29 | End: 2018-01-31

## 2018-01-29 RX ORDER — LORAZEPAM 2 MG/ML
1 INJECTION INTRAMUSCULAR
Status: DISCONTINUED | OUTPATIENT
Start: 2018-01-29 | End: 2018-01-30

## 2018-01-29 RX ORDER — BUTALBITAL, ACETAMINOPHEN AND CAFFEINE 50; 325; 40 MG/1; MG/1; MG/1
1 TABLET ORAL
Status: COMPLETED | OUTPATIENT
Start: 2018-01-29 | End: 2018-01-29

## 2018-01-29 RX ORDER — TRAZODONE HYDROCHLORIDE 100 MG/1
100 TABLET ORAL
Status: DISCONTINUED | OUTPATIENT
Start: 2018-01-29 | End: 2018-02-01 | Stop reason: HOSPADM

## 2018-01-29 RX ORDER — ASPIRIN 325 MG
650 TABLET ORAL DAILY PRN
Status: DISCONTINUED | OUTPATIENT
Start: 2018-01-29 | End: 2018-02-01 | Stop reason: HOSPADM

## 2018-01-29 RX ORDER — KETOROLAC TROMETHAMINE 30 MG/ML
30 INJECTION, SOLUTION INTRAMUSCULAR; INTRAVENOUS
Status: COMPLETED | OUTPATIENT
Start: 2018-01-29 | End: 2018-01-29

## 2018-01-29 RX ORDER — KETOROLAC TROMETHAMINE 30 MG/ML
15 INJECTION, SOLUTION INTRAMUSCULAR; INTRAVENOUS
Status: DISCONTINUED | OUTPATIENT
Start: 2018-01-29 | End: 2018-01-30

## 2018-01-29 RX ORDER — NEBIVOLOL 2.5 MG/1
5 TABLET ORAL
Status: DISCONTINUED | OUTPATIENT
Start: 2018-01-29 | End: 2018-02-01 | Stop reason: HOSPADM

## 2018-01-29 RX ORDER — SODIUM CHLORIDE 0.9 % (FLUSH) 0.9 %
5-10 SYRINGE (ML) INJECTION EVERY 8 HOURS
Status: DISCONTINUED | OUTPATIENT
Start: 2018-01-29 | End: 2018-02-01 | Stop reason: HOSPADM

## 2018-01-29 RX ORDER — ERGOCALCIFEROL 1.25 MG/1
50000 CAPSULE ORAL
Status: DISCONTINUED | OUTPATIENT
Start: 2018-01-30 | End: 2018-02-01 | Stop reason: HOSPADM

## 2018-01-29 RX ORDER — ENOXAPARIN SODIUM 100 MG/ML
40 INJECTION SUBCUTANEOUS EVERY 24 HOURS
Status: DISCONTINUED | OUTPATIENT
Start: 2018-01-29 | End: 2018-02-01 | Stop reason: HOSPADM

## 2018-01-29 RX ORDER — FLUOXETINE HYDROCHLORIDE 20 MG/1
40 CAPSULE ORAL DAILY
Status: DISCONTINUED | OUTPATIENT
Start: 2018-01-30 | End: 2018-02-01 | Stop reason: HOSPADM

## 2018-01-29 RX ORDER — CYANOCOBALAMIN 1000 UG/ML
1000 INJECTION, SOLUTION INTRAMUSCULAR; SUBCUTANEOUS
Status: DISCONTINUED | OUTPATIENT
Start: 2018-01-29 | End: 2018-02-01 | Stop reason: HOSPADM

## 2018-01-29 RX ORDER — ONDANSETRON 2 MG/ML
4 INJECTION INTRAMUSCULAR; INTRAVENOUS
Status: DISCONTINUED | OUTPATIENT
Start: 2018-01-29 | End: 2018-02-01 | Stop reason: HOSPADM

## 2018-01-29 RX ORDER — LEVOTHYROXINE SODIUM 50 UG/1
50 TABLET ORAL
Status: DISCONTINUED | OUTPATIENT
Start: 2018-01-30 | End: 2018-02-01 | Stop reason: HOSPADM

## 2018-01-29 RX ORDER — ONDANSETRON 4 MG/1
4 TABLET, ORALLY DISINTEGRATING ORAL
Status: COMPLETED | OUTPATIENT
Start: 2018-01-29 | End: 2018-01-29

## 2018-01-29 RX ORDER — SODIUM CHLORIDE 0.9 % (FLUSH) 0.9 %
5-10 SYRINGE (ML) INJECTION AS NEEDED
Status: DISCONTINUED | OUTPATIENT
Start: 2018-01-29 | End: 2018-02-01 | Stop reason: HOSPADM

## 2018-01-29 RX ORDER — CYANOCOBALAMIN 1000 UG/ML
1000 INJECTION, SOLUTION INTRAMUSCULAR; SUBCUTANEOUS
COMMUNITY

## 2018-01-29 RX ORDER — LEVOFLOXACIN 5 MG/ML
750 INJECTION, SOLUTION INTRAVENOUS EVERY 24 HOURS
Status: DISCONTINUED | OUTPATIENT
Start: 2018-01-30 | End: 2018-01-29

## 2018-01-29 RX ADMIN — Medication 10 ML: at 21:06

## 2018-01-29 RX ADMIN — CYANOCOBALAMIN 1000 MCG: 1000 INJECTION, SOLUTION INTRAMUSCULAR at 20:13

## 2018-01-29 RX ADMIN — KETOROLAC TROMETHAMINE 30 MG: 30 INJECTION, SOLUTION INTRAMUSCULAR at 17:33

## 2018-01-29 RX ADMIN — SODIUM CHLORIDE 125 ML/HR: 900 INJECTION, SOLUTION INTRAVENOUS at 20:12

## 2018-01-29 RX ADMIN — TRAZODONE HYDROCHLORIDE 100 MG: 100 TABLET ORAL at 21:05

## 2018-01-29 RX ADMIN — ENOXAPARIN SODIUM 40 MG: 40 INJECTION SUBCUTANEOUS at 19:26

## 2018-01-29 RX ADMIN — KETOROLAC TROMETHAMINE 15 MG: 30 INJECTION, SOLUTION INTRAMUSCULAR at 21:05

## 2018-01-29 RX ADMIN — SODIUM CHLORIDE 1000 ML: 900 INJECTION, SOLUTION INTRAVENOUS at 17:34

## 2018-01-29 RX ADMIN — BUTALBITAL, ACETAMINOPHEN AND CAFFEINE 1 TABLET: 50; 325; 40 TABLET ORAL at 14:50

## 2018-01-29 RX ADMIN — MEROPENEM 1 G: 1 INJECTION, POWDER, FOR SOLUTION INTRAVENOUS at 21:06

## 2018-01-29 RX ADMIN — CEFTRIAXONE 1 G: 1 INJECTION, POWDER, FOR SOLUTION INTRAMUSCULAR; INTRAVENOUS at 17:31

## 2018-01-29 RX ADMIN — ONDANSETRON 4 MG: 4 TABLET, ORALLY DISINTEGRATING ORAL at 15:00

## 2018-01-29 NOTE — ED NOTES
Pt abx completed. Normal saline continues infusing at this time. Hospitalist at bedside. Pt pain 4/10. Needs met at this time.

## 2018-01-29 NOTE — ED NOTES
Pt. Resting comfortably in bed, denies needs at this time. Bed locked and low, call bell in reach. Awaiting RX verification for medication.

## 2018-01-29 NOTE — ED NOTES
RN spoke to Ramon Duran [place]. RN told pt hospitalist will come and speak with her. Pt understands and agrees with plan. Pt provided blanket and crackers for comfort.

## 2018-01-29 NOTE — ED NOTES
Phoned lab and drug screen being processed at this time with urine sample in lab. ED tech at bedside at this time attempting IV access at this time.

## 2018-01-29 NOTE — IP AVS SNAPSHOT
[address] [facility] 
[phone] Patient: Queenie Maguire MRN: [id number] :1970 You are allergic to the following No active allergies Recent Documentation Height Weight Breastfeeding? BMI OB Status Smoking Status 1.626 m 79.1 kg No 29.93 kg/m2 Hysterectomy Never Smoker Unresulted Labs-Please follow up with your PCP about these lab tests Order Current Status COPPER In process Emergency Contacts  (Rel.) Home Phone Work Phone Mobile Phone Mary Menjivar [phone] About your hospitalization You were admitted on:  2018 You last received care in the:  [address] You were discharged on:  2018 Why you were hospitalized Your primary diagnosis was:  Pyelonephritis Your diagnoses also included:  Hypothyroid, Depression, Vitamin B12 Deficiency, Svt (Supraventricular Tachycardia) (Hcc), S/P Gastric Bypass, Anxiety Providers Seen During Your Hospitalization Provider Specialty Primary office phone Sara Mcarthur DO Emergency Medicine [phone] Tamika Greer MD Internal Medicine [phone] Demetri Caldera MD Internal Medicine [phone] Your Primary Care Physician (PCP) Primary Care Physician Office Phone Office Fax Gay Britney [phone] Follow-up Information Follow up With Details Comments Contact Info Ba Higginbotham MD In 2 weeks routine hospital follow up with repeat urine analysis [address] [employer] [employer] [facility] 
[phone] Godfrey Strete MD In 2 weeks follow up for your bladder prolapse [address] [place] [address] [address] [place] [address] 
[phone] My Medications TAKE these medications as instructed Instructions Each Dose to Equal  
 Morning Noon Evening Bedtime BC HEADACHE POWDER PO Your last dose was: Your next dose is: Take 1 Packet by mouth daily as needed (headache). 1 Packet  
    
   
   
   
  
 butalbital-acetaminophen-caffeine -40 mg per tablet Commonly known as:  Sulaiman Nehemias Your last dose was: Your next dose is: Take 1 Tab by mouth every six (6) hours as needed for Headache. 1 Tab BYSTOLIC 5 mg tablet Generic drug:  nebivolol Your last dose was: Your next dose is: Take 5 mg by mouth daily as needed ('rapid heart beat'). 5 mg  
    
   
   
   
  
 cephALEXin 500 mg capsule Commonly known as:  Peyton Garter Your last dose was: Your next dose is: Take 1 Cap by mouth four (4) times daily for 10 days. 500 mg  
    
   
   
   
  
 cyanocobalamin 1,000 mcg/mL injection Commonly known as:  VITAMIN B12 Your last dose was: Your next dose is:    
   
   
 1,000 mcg by IntraMUSCular route every twenty-eight (28) days. 1000 mcg FLUoxetine 40 mg capsule Commonly known as:  PROzac Your last dose was: Your next dose is: Take 40 mg by mouth daily. 40 mg  
    
   
   
   
  
 metroNIDAZOLE 500 mg tablet Commonly known as:  FLAGYL Your last dose was: Your next dose is: Take 1 Tab by mouth three (3) times daily as needed for Diarrhea or Other. 500 mg  
    
   
   
   
  
 ondansetron 4 mg disintegrating tablet Commonly known as:  ZOFRAN ODT Your last dose was: Your next dose is: Take 1 Tab by mouth every eight (8) hours as needed for Nausea. 4 mg  
    
   
   
   
  
 oxyCODONE IR 5 mg immediate release tablet Commonly known as:  Craig Miners Your last dose was: Your next dose is: Take 1 Tab by mouth every six (6) hours as needed for Pain.  Max Daily Amount: 20 mg.  
 5 mg  
    
   
   
   
  
 synthroid 50 mcg tablet Generic drug:  levothyroxine Your last dose was: Your next dose is: Take 50 mcg by mouth Daily (before breakfast). 50 mcg  
    
   
   
   
  
 traZODone 100 mg tablet Commonly known as:  Anh Montejo Your last dose was: Your next dose is: Take 100 mg by mouth nightly as needed for Sleep. 100 mg  
    
   
   
   
  
 VITAMIN D2 50,000 unit capsule Generic drug:  ergocalciferol Your last dose was: Your next dose is: Take 50,000 Units by mouth two (2) times a week. 'On  and  or .'  
 00018 Units Where to Get Your Medications Information on where to get these meds will be given to you by the nurse or doctor. ! Ask your nurse or doctor about these medications  
  butalbital-acetaminophen-caffeine -40 mg per tablet  
 cephALEXin 500 mg capsule  
 metroNIDAZOLE 500 mg tablet  
 ondansetron 4 mg disintegrating tablet  
 oxyCODONE IR 5 mg immediate release tablet Discharge Instructions HOSPITALIST DISCHARGE INSTRUCTIONS 
 
NAME: Abad Peter :  1970 MRN:  [id number] Date/Time:  2018 8:38 AM 
 
ADMIT DATE: 2018 DISCHARGE DATE: 2018 Attending Physician: Jayme Prater MD 
 
DISCHARGE DIAGNOSIS: 
Pyelonephritis in setting of bladder prolapse Depression with anxiety Headache Hypothyroidism Supraventricular tachycardia history B12 deficiency MEDICATIONS: 
See above · It is important that you take the medication exactly as they are prescribed. · Keep your medication in the bottles provided by the pharmacist and keep a list of the medication names, dosages, and times to be taken in your wallet. · Do not take other medications without consulting your doctor. Pain Management: per above medications What to do at [facility] Recommended diet:  Resume previous diet Recommended activity: Activity as tolerated If you have questions regarding the hospital related prescriptions or hospital related issues please call [facility] at . You can always direct your questions to your primary care doctor if you are unable to reach your hospital physician; your PCP works as an extension of your hospital doctor just like your hospital doctor is an extension of your PCP for your time at [address] If you experience any of the following symptoms then please call your primary care physician or return to the emergency room if you cannot get hold of your doctor: 
Fever, chills, nausea, vomiting, diarrhea, change in mentation, falling, bleeding, shortness of breath Additional Instructions: 
 
I recommend that you take an over-the-counter probiotic (such as Align, Culturelle, or store generic) while you are on antibiotics. Bring these papers with you to your follow up appointments. The papers will help your doctors be sure to continue the care plan from the hospital. 
 
 
 
 
 
 
Information obtained by : 
I understand that if any problems occur once I am at home I am to contact my physician. I understand and acknowledge receipt of the instructions indicated above. Physician's or R.N.'s Signature                                                                  Date/Time Patient or Representative Signature                                                          Date/Time Kidney Infection: Care Instructions Your Care Instructions A kidney infection (pyelonephritis) is a type of urinary tract infection, or UTI. Most UTIs are bladder infections. Kidney infections tend to make people much sicker than bladder infections do. A kidney infection is also more serious because it can cause lasting damage if it is not treated quickly. Follow-up care is a key part of your treatment and safety. Be sure to make and go to all appointments, and call your doctor if you are having problems. It's also a good idea to know your test results and keep a list of the medicines you take. How can you care for yourself at home? · Take your antibiotics as directed. Do not stop taking them just because you feel better. You need to take the full course of antibiotics. · Drink plenty of water, enough so that your urine is light yellow or clear like water. This may help wash out bacteria that are causing the infection. If you have kidney, heart, or liver disease and have to limit fluids, talk with your doctor before you increase the amount of fluids you drink. · Urinate often. Try to empty your bladder each time. · To relieve pain, take a hot shower or lay a heating pad (set on low) over your lower belly. Never go to sleep with a heating pad in place. Put a thin cloth between the heating pad and your skin. To help prevent kidney infections · Drink plenty of water each day. This helps you urinate often, which clears bacteria from your system. If you have kidney, heart, or liver disease and have to limit fluids, talk with your doctor before you increase the amount of fluids you drink. · Urinate when you have the urge. Do not hold your urine for a long time. Urinate before you go to sleep. · If you have symptoms of a bladder infection, such as burning when you urinate or having to urinate often, call your doctor so you can treat the problem before it gets worse. If you do not treat a bladder infection quickly, it can spread to the kidney. · Men should keep the tip of the penis clean. If you are a woman, keep these ideas in mind: · Urinate right after you have sex. · Change sanitary pads often. Avoid douches, feminine hygiene sprays, and other feminine hygiene products that have deodorants. · After going to the bathroom, wipe from front to back. When should you call for help? Call your doctor now or seek immediate medical care if: 
? · You have symptoms that a kidney infection is getting worse. These may include: 
¨ Pain or burning when you urinate. ¨ A frequent need to urinate without being able to pass much urine. ¨ Pain in the flank, which is just below the rib cage and above the waist on either side of the back. ¨ Blood in the urine. ¨ A fever. ? · You are vomiting or nauseated. ? Watch closely for changes in your health, and be sure to contact your doctor if: 
? · You do not get better as expected. Where can you learn more? Go to http://praful-monserrat.info/. Enter Z519 in the search box to learn more about \"Kidney Infection: Care Instructions. \" Current as of: May 12, 2017 Content Version: 11.4 © 6129-2646 [employer]. Care instructions adapted under license by [employer] (which disclaims liability or warranty for this information). If you have questions about a medical condition or this instruction, always ask your healthcare professional. [place] [address] any warranty or liability for your use of this information. Discharge Orders None [facility] Announcement We are excited to announce that we are making your provider's discharge notes available to you in [employer]. You will see these notes when they are completed and signed by the physician that discharged you from your recent hospital stay. If you have any questions or concerns about any information you see in [employer], please call the Health Information Department where you were seen or reach out to your Primary Care Provider for more information about your plan of care. Introducing [facility] [place] [facility]! [facility] introduces [employer]erie patient portal. Now you can access parts of your medical record, email your doctor's office, and request medication refills online. 1. In your internet browser, go to [email or web address] 2. Click on the First Time User? Click Here link in the Sign In box. You will see the New Member Sign Up page. 3. Enter your Houserie Access Code exactly as it appears below. You will not need to use this code after youve completed the sign-up process. If you do not sign up before the expiration date, you must request a new code. · H[employer]erie Access Code: [email or web address] Expires: 5/2/2018 10:56 AM 
 
4. Enter the last four digits of your Social Security Number (xxxx) and Date of Birth (mm/dd/yyyy) as indicated and click Submit. You will be taken to the next sign-up page. 5. Create a Houserie ID. This will be your Houserie login ID and cannot be changed, so think of one that is secure and easy to remember. 6. Create a H[employer]erie password. You can change your password at any time. 7. Enter your Password Reset Question and Answer. This can be used at a later time if you forget your password. 8. Enter your e-mail address. You will receive e-mail notification when new information is available in [address] 9. Click Sign Up. You can now view and download portions of your medical record. 10. Click the Download Summary menu link to download a portable copy of your medical information. If you have questions, please visit the Frequently Asked Questions section of the H[employer]erie website. Remember, Houserie is NOT to be used for urgent needs. For medical emergencies, dial 911. Now available from your iPhone and Android! General Information Please provide this summary of care documentation to your next provider. Patient Signature:  ____________________________________________________________ Date:  ____________________________________________________________  
  
Amalia Christian Provider Signature:  ____________________________________________________________ Date:  ____________________________________________________________

## 2018-01-29 NOTE — PROGRESS NOTES
Spiritual Care Assessment/Progress Notes    Serafin Sams [id number]  [id number]    1970  55 y.o.  female    Patient Telephone Number: [phone] (home)   Protestant Affiliation: Quique Guzman   Language: [place]   Extended Emergency Contact Information  Primary Emergency Contact: April Olsen, [address]  Mobile Phone: [phone]  Relation: Spouse   Patient Active Problem List    Diagnosis Date Noted    UTI (urinary tract infection) 11/16/2017    Pyelonephritis 11/16/2017    Severe sepsis ([facility] 11/16/2017    Altered mental status 06/11/2016    Hypothyroid 06/11/2016     Class: Chronic    Depression 06/11/2016     Class: Chronic    Anxiety 06/11/2016     Class: Chronic    Vitamin B12 deficiency 06/11/2016     Class: Chronic    [facility] 06/11/2016     Class: Acute    S/P gastric bypass 06/11/2016     Class: Present on Admission    Irritable bowel 06/11/2016     Class: Chronic    Polypharmacy 06/11/2016     Class: Chronic    Abuse of non-prescription analgesics 06/11/2016     Class: Chronic    History of prescription drug abuse ([facility] 06/11/2016     Class: Present on Admission    Generalized abdominal pain 09/21/2015        Date: 11/20/2017       Level of Protestant/Spiritual Activity:  []         Involved in denny tradition/spiritual practice    []         Not involved in denny tradition/spiritual practice  [x]         Spiritually oriented    []         Claims no spiritual orientation    []         seeking spiritual identity  []         Feels alienated from Yarsani practice/tradition  []         Feels angry about Yarsani practice/tradition  [x]         Spirituality/Yarsani tradition is a resource for coping at this time.   []         Not able to assess due to medical condition    Services Provided Today:  []         crisis intervention    []         reading Scriptures  [x]         spiritual assessment    [x] C3CN prayer  [x]         empathic listening/emotional support  []         rites and rituals (cite in comments)  [x]         life review     []         Jehovah's witness support  []         theological development   []         advocacy  []         ethical dialog     []         blessing  []         bereavement support    []         support to family  []         anticipatory grief support   []         help with AMD  []         spiritual guidance    []         meditation      Spiritual Care Needs  [x]         Emotional Support  [x]         Spiritual/Sikhism Care  []         Loss/Adjustment  []         Advocacy/Referral                /Ethics  []         No needs expressed at               this time  []         Other: (note in               comments)  [address]  []         Follow up visits with               pt/family  []         Provide materials  []         Schedule sacraments  []         Contact Community               Clergy  [x]         Follow up as needed  []         Other: (note in               comments)     Initial Spiritual Assessment in 3242. Pt awake and alert, processed through health history spanning back to childhood along with associated feelings.  provided empathic listening. Pt receives daily visits from family who are nearby. Self reported to be coping well although admitting some moments of feeling down and discouraged. Pt's [place] denny is a source of strength. Provided guided imagery as a form of positive encouragement and reassurance in pt's denny. Prayed with pt and advised of availability. Concluded visit to allow pt to get to phone messages being sent by friend and family. KARLY Luu

## 2018-01-29 NOTE — PROGRESS NOTES
Pharmacy Clarification of Prior to Admission Medication Regimen     The patient was interviewed regarding clarification of the prior to admission medication regimen and was questioned regarding use of any other inhalers, topical products, over the counter medications, herbal medications, vitamin products or ophthalmic/nasal/otic medication use. Information Obtained From: Patient and Rx Query    Pertinent Pharmacy Findings:   Updated patients preferred outpatient pharmacy to: [facility] #[id number]- [place], [address] ASPIRIN/SALICYLAMIDE/CAFFEINE (BC HEADACHE POWDER PO): Patient stated that she takes this OTC agent at home, but was unaware of the strength. PTA medication list was corrected to the following:     Prior to Admission Medications   Prescriptions Last Dose Informant Patient Reported? Taking? ASPIRIN/SALICYLAMIDE/CAFFEINE (BC HEADACHE POWDER PO) 2018 at Unknown time Self Yes Yes   Sig: Take 1 Packet by mouth daily as needed (headache). FLUoxetine (PROZAC) 40 mg capsule 2018 at Unknown time Self Yes Yes   Sig: Take 40 mg by mouth daily. cyanocobalamin (VITAMIN B12) 1,000 mcg/mL injection 2017 at Unknown time Self Yes Yes   Si,000 mcg by IntraMUSCular route every twenty-eight (28) days. ergocalciferol (VITAMIN D2) 50,000 unit capsule 2018 at Unknown time Self Yes Yes   Sig: Take 50,000 Units by mouth two (2) times a week. 'On  and  or .'   levothyroxine (SYNTHROID) 50 mcg tablet 2018 at Unknown time Self Yes Yes   Sig: Take 50 mcg by mouth Daily (before breakfast). nebivolol (BYSTOLIC) 5 mg tablet  at Unknown time Self Yes Yes   Sig: Take 5 mg by mouth daily as needed ('rapid heart beat'). traZODone (DESYREL) 100 mg tablet 2018 at Unknown time Self Yes Yes   Sig: Take 100 mg by mouth nightly as needed for Sleep.       Facility-Administered Medications: None          Thank you,  Kay Armstrong, [facility]  Medication History Pharmacy Technician

## 2018-01-29 NOTE — ED PROVIDER NOTES
EMERGENCY DEPARTMENT HISTORY AND PHYSICAL EXAM      Date: 1/29/2018  Patient Name: Margie Atwood    History of Presenting Illness     Chief Complaint   Patient presents with    Urinary Frequency     with burning and urgency x 2 days    Chills    Nausea       History Provided By: Patient and Patient's     HPI: Margie Atwood, 52 y.o. female with PMHx significant for hypothyroidism, chronic pain, anemia, headache, bladder prolapse s/p bladder suspension, hysterectomy presents ambulatory to the ED with cc of mild to moderately severe and constant dysuria x two days. Pt reports no modifying factors of her sx. She states she was admitted to the hospital in 11/2017 for 7-8 days for uro-sepsis and was discharged with a PICC line, and notes her cc feels c/w a recurrent UTI. She also c/o frequency, urgency, lower back pain, nausea, dry heaving, headache, decreased appetite, malaise, fatigue, and dizziness. She specifically denies any measured fevers or flank pain. PCP: Violette Bobo MD   Uro-Gyn: Suella Scheuermann, MD [facility])    There are no other complaints, changes, or physical findings at this time.     Current Facility-Administered Medications   Medication Dose Route Frequency Provider Last Rate Last Dose    sodium chloride (NS) flush 5-10 mL  5-10 mL IntraVENous [address], [place]        sodium chloride (NS) flush 5-10 mL  5-10 mL IntraVENous PRN [place], [place]        ondansetron [facility]) injection 4 mg  4 mg IntraVENous ONCE [place], [place]   Stopped at 01/29/18 1337    0.9% sodium chloride infusion  125 mL/hr IntraVENous CONTINUOUS June Swift  mL/hr at 01/29/18 2012 125 mL/hr at 01/29/18 2012    ondansetron (ZOFRAN) injection 4 mg  4 mg IntraVENous Q4H PRN June Swift MD        enoxaparin (LOVENOX) injection 40 mg  40 mg SubCUTAneous Q24H June Swift MD   40 mg at 01/29/18 1926    cyanocobalamin (VITAMIN B12) injection 1,000 mcg  1,000 mcg IntraMUSCular Q28D Sharyl Mohs, MD   1,000 mcg at 01/29/18 2013    aspirin (ASPIRIN) tablet 650 mg  650 mg Oral DAILY PRN Sharyl Mohs, MD        [START ON 1/30/2018] FLUoxetine (PROzac) capsule 40 mg  40 mg Oral DAILY Sharyl Mohs, MD        traZODone (DESYREL) tablet 100 mg  100 mg Oral QHS PRN Sharyl Mohs, MD        nebivolol (BYSTOLIC) tablet 5 mg  5 mg Oral DAILY PRN Sharyl Mohs, MD        [START ON 1/30/2018] ergocalciferol (ERGOCALCIFEROL) capsule 50,000 Units  50,000 Units Oral EVERY TU & FRI Sharyl Mohs, MD       [address] [place] [address] Apa ON 1/30/2018] levothyroxine (SYNTHROID) tablet 50 mcg  50 mcg Oral ACB Sharyl Mohs, MD        LORazepam (ATIVAN) injection 1 mg  1 mg IntraVENous Q6H PRN Sharyl Mohs, MD        meropenem (MERREM) 1 g in 0.9% sodium chloride (MBP/ADV) 50 mL  1 g IntraVENous NOW Anneliese Thibodeaux MD        [START ON 1/30/2018] meropenem (MERREM) 1 g in 0.9% sodium chloride (MBP/ADV) 50 mL  1 g IntraVENous Q8H Anneliese Thibodeaux MD        ketorolac (TORADOL) injection 15 mg  15 mg IntraVENous Q12H PRN Anneliese Thibodeaux MD           Past History     Past Medical History:  Past Medical History:   Diagnosis Date    Anemia     Anxiety     Arthritis     osteoarthritis    Autoimmune disease ([facility] [address]     Bowel trouble     Burning with urination     Chronic pain     Depression     Dyspepsia and other specified disorders of function of stomach     Headache     Hypothyroid     Osteoporosis        Past Surgical History:  Past Surgical History:   Procedure Laterality Date    HX BLADDER SUSPENSION      HX CATARACT REMOVAL  12/14    HX GASTRIC BYPASS  8/1999    HX HERNIA REPAIR      HX HYSTERECTOMY  2007    NEUROLOGICAL PROCEDURE UNLISTED  2003, 2004    back surgery        Family History:  Family History   Problem Relation Age of Onset    Hypertension Father     Diabetes Father     Diabetes Paternal Aunt     Heart Disease Maternal Grandmother     Stroke Maternal Grandmother Social History:  Social History   Substance Use Topics    Smoking status: Never Smoker    Smokeless tobacco: Never Used    Alcohol use 0.6 oz/week     1 Glasses of wine per week      Comment: 1 glass of wine/day       Allergies:  No Known Allergies      Review of Systems   Review of Systems   Constitutional: Positive for appetite change and fatigue. Negative for chills and fever. HENT: Negative for congestion, rhinorrhea and sore throat. Eyes: Negative for photophobia and visual disturbance. Respiratory: Negative for cough and shortness of breath. Cardiovascular: Negative for chest pain and palpitations. Gastrointestinal: Positive for nausea and vomiting. Negative for abdominal pain and diarrhea. Endocrine: Negative for polydipsia, polyphagia and polyuria. Genitourinary: Positive for dysuria, frequency and urgency. Negative for flank pain and hematuria. Musculoskeletal: Positive for back pain. Negative for neck pain and neck stiffness. Skin: Negative for rash and wound. Allergic/Immunologic: Positive for immunocompromised state. Negative for food allergies. Neurological: Positive for dizziness, weakness and headaches. Psychiatric/Behavioral: Negative for agitation and confusion. The patient is nervous/anxious. Physical Exam   Physical Exam   Constitutional: She is oriented to person, place, and time. She appears well-developed and well-nourished. No distress. WDWN female, alert, anxious, in moderate discomfort   HENT:   Head: Normocephalic and atraumatic. Nose: Nose normal.   Mouth/Throat: Oropharynx is clear and moist. No oropharyngeal exudate. Eyes: Conjunctivae and EOM are normal. Right eye exhibits no discharge. Left eye exhibits no discharge. No scleral icterus. Neck: Normal range of motion. Neck supple. No JVD present. No tracheal deviation present. No thyromegaly present. Cardiovascular: Normal rate, regular rhythm and normal heart sounds. Pulmonary/Chest: Effort normal and breath sounds normal. No respiratory distress. She has no wheezes. Abdominal: Soft. She exhibits no distension. There is no tenderness. There is no rebound and no guarding. Musculoskeletal: Normal range of motion. She exhibits no edema. Lymphadenopathy:     She has no cervical adenopathy. Neurological: She is alert and oriented to person, place, and time. She exhibits normal muscle tone. Coordination normal.   Skin: Skin is warm and dry. She is not diaphoretic. No pallor. Psychiatric: She has a normal mood and affect. Her behavior is normal. Judgment normal.   Nursing note and vitals reviewed. Diagnostic Study Results     Labs -     Recent Results (from the past 12 hour(s))   CBC WITH AUTOMATED DIFF    Collection Time: 01/29/18 11:03 AM   Result Value Ref Range    WBC 5.5 3.6 - 11.0 K/uL    RBC 4.86 3.80 - 5.20 M/uL    HGB 14.6 11.5 - 16.0 g/dL    HCT 44.4 35.0 - 47.0 %    MCV 91.4 80.0 - 99.0 FL    MCH 30.0 26.0 - 34.0 PG    MCHC 32.9 30.0 - 36.5 g/dL    RDW 12.1 11.5 - 14.5 %    PLATELET 613 831 - 402 K/uL    MPV 9.3 8.9 - 12.9 FL    NRBC 0.0 0  WBC    ABSOLUTE NRBC 0.00 0.00 - 0.01 K/uL    NEUTROPHILS 79 (H) 32 - 75 %    LYMPHOCYTES 14 12 - 49 %    MONOCYTES 6 5 - 13 %    EOSINOPHILS 1 0 - 7 %    BASOPHILS 0 0 - 1 %    IMMATURE GRANULOCYTES 0 0.0 - 0.5 %    ABS. NEUTROPHILS 4.3 1.8 - 8.0 K/UL    ABS. LYMPHOCYTES 0.8 0.8 - 3.5 K/UL    ABS. MONOCYTES 0.3 0.0 - 1.0 K/UL    ABS. EOSINOPHILS 0.1 0.0 - 0.4 K/UL    ABS. BASOPHILS 0.0 0.0 - 0.1 K/UL    ABS. IMM.  GRANS. 0.0 0.00 - 0.04 K/UL    DF SMEAR SCANNED      RBC COMMENTS NORMOCYTIC, NORMOCHROMIC     METABOLIC PANEL, COMPREHENSIVE    Collection Time: 01/29/18 11:03 AM   Result Value Ref Range    Sodium 137 136 - 145 mmol/L    Potassium 3.8 3.5 - 5.1 mmol/L    Chloride 99 97 - 108 mmol/L    CO2 31 21 - 32 mmol/L    Anion gap 7 5 - 15 mmol/L    Glucose 101 (H) 65 - 100 mg/dL    BUN 7 6 - 20 MG/DL Creatinine 0.60 0.55 - 1.02 MG/DL    BUN/Creatinine ratio 12 12 - 20      GFR est AA >60 >60 ml/min/1.73m2    GFR est non-AA >60 >60 ml/min/1.73m2    Calcium 8.8 8.5 - 10.1 MG/DL    Bilirubin, total 0.6 0.2 - 1.0 MG/DL    ALT (SGPT) 49 12 - 78 U/L    AST (SGOT) 45 (H) 15 - 37 U/L    Alk. phosphatase 172 (H) 45 - 117 U/L    Protein, total 6.9 6.4 - 8.2 g/dL    Albumin 3.8 3.5 - 5.0 g/dL    Globulin 3.1 2.0 - 4.0 g/dL    A-G Ratio 1.2 1.1 - 2.2     URINALYSIS W/ REFLEX CULTURE    Collection Time: 01/29/18  1:00 PM   Result Value Ref Range    Color YELLOW/STRAW      Appearance CLOUDY (A) CLEAR      Specific gravity 1.015 1.003 - 1.030      pH (UA) 6.5 5.0 - 8.0      Protein TRACE (A) NEG mg/dL    Glucose NEGATIVE  NEG mg/dL    Ketone TRACE (A) NEG mg/dL    Bilirubin NEGATIVE  NEG      Blood TRACE (A) NEG      Urobilinogen 1.0 0.2 - 1.0 EU/dL    Nitrites POSITIVE (A) NEG      Leukocyte Esterase LARGE (A) NEG      WBC >100 (H) 0 - 4 /hpf    RBC 5-10 0 - 5 /hpf    Epithelial cells FEW FEW /lpf    Bacteria 4+ (A) NEG /hpf    UA:UC IF INDICATED URINE CULTURE ORDERED (A) CNI     DRUG SCREEN, URINE    Collection Time: 01/29/18  1:00 PM   Result Value Ref Range    AMPHETAMINES NEGATIVE  NEG      BARBITURATES NEGATIVE  NEG      BENZODIAZEPINES NEGATIVE  NEG      COCAINE NEGATIVE  NEG      METHADONE NEGATIVE  NEG      OPIATES NEGATIVE  NEG      PCP(PHENCYCLIDINE) NEGATIVE  NEG      THC (TH-CANNABINOL) NEGATIVE  NEG      Drug screen comment (NOTE)    MAGNESIUM    Collection Time: 01/29/18  3:49 PM   Result Value Ref Range    Magnesium 2.5 (H) 1.6 - 2.4 mg/dL   LACTIC ACID    Collection Time: 01/29/18  3:49 PM   Result Value Ref Range    Lactic acid 1.2 0.4 - 2.0 MMOL/L   ETHYL ALCOHOL    Collection Time: 01/29/18  3:49 PM   Result Value Ref Range    ALCOHOL(ETHYL),SERUM <10 <10 MG/DL         Medical Decision Making   I am the first provider for this patient.     I reviewed the vital signs, available nursing notes, past medical history, past surgical history, family history and social history. Vital Signs-Reviewed the patient's vital signs. Patient Vitals for the past 12 hrs:   Temp Pulse Resp BP SpO2   01/29/18 1952 98.2 °F (36.8 °C) 85 18 130/74 96 %   01/29/18 1905 98.2 °F (36.8 °C) 90 16 138/89 99 %   01/29/18 1520 - (!) 105 18 154/82 98 %   01/29/18 1047 98.6 °F (37 °C) (!) 104 18 (!) 163/100 95 %       Pulse Oximetry Analysis - 95% on RA      Records Reviewed: Nursing Notes, Old Medical Records and Previous Laboratory Studies    Provider Notes (Medical Decision Making): UTI, electrolyte dysfunction, dehydration, alcohol withdrawal    ED Course:   Initial assessment performed. The patients presenting problems have been discussed, and they are in agreement with the care plan formulated and outlined with them. I have encouraged them to ask questions as they arise throughout their visit. Consult Note:  2:59 PM  ALEENA Henley spoke with Gilmar Guerrero MD,  Specialty: uro-gyn  Discussed pt's hx, disposition, and available diagnostic and imaging results. Reviewed care plans. Consultant agrees with plans as outlined. Dr. Georgina Borden states he has not seen the pt in years and possibly recommends dose of Rocephin. Written by Royanne Favre, ED Scribe, as dictated by Belinda Ridley.     3:15 PM   states that the pt has an ongoing ETOH problem. He states that he had planned to take the pt to a tx center, who will not take the pt until she has been detoxed for 7 days. Yesterday he found the pt had been drinking, then took the pt to her father's house so that their daughter was not exposed to more of this behavior. While at her father's house, the pt consumed more ETOH yesterday. Explained to  that pt's tachycardia may be attributed to possible ETOH withdrawal. Mag, etoh level, UDS and lactate ordered. Written by Royanne Favre, ED Scribe, as dictated by Belinda Ridley.        Consult Note:   3:27 PM  ALEENA Henley spoke with Tim Hernandez MD   Specialty: Hospitalist  Discussed pt's hx, disposition, and available diagnostic and imaging results. Reviewed care plans. Consultant will evaluate pt for admission. Written by Miguel Mazariegos ED Scribe, as dictated by Reji Parson. Critical Care Time: none    Disposition:    1. Admit to hospitalist.    Admit Note:  3:30 PM  Pt is being admitted by Dr. Tim Hernandez, hospitalist. The results of their tests and reason(s) for their admission have been discussed with pt and/or available family. They convey agreement and understanding for the need to be admitted and for admission diagnosis. PLAN:  1. Admit to hospitalist service      Diagnosis     Clinical Impression:   1. Acute cystitis without hematuria    2. History of ESBL E. coli infection    3. History of alcohol dependence ([facility] [address]        Attestations: This note is prepared by Miguel Mazariegos, acting as Scribe for Reji Parson. The scribe's documentation has been prepared under my direction and personally reviewed by me in its entirety. I confirm that the note above accurately reflects all work, treatment, procedures, and medical decision making performed by me.

## 2018-01-29 NOTE — ED NOTES
Vitals updated. Pts  asked to speak in private with this RN. Pts  Camilo [place] stated that his wife is an alcoholic and has been in and out-pt rehab several times. Pt was to supposed to start rehab again today at [facility]. He is worried that she will lose her spot. Pts  is anxious and will leave hospital to attend to 15year old daughter who broke her arm recently. Mr Kevin Pearce left his contact number for follow up [phone] if providers needs information at any time. Pt saw her  speak to RN and to Dottie Roberts and is upset that he is making staff aware of her alcoholism.

## 2018-01-29 NOTE — ED NOTES
Pt medicated per MAR. Pt in bed resting. Pt aware she is awaiting admission. Pt drinking apple juice. Needs met at this time.

## 2018-01-29 NOTE — ED NOTES
RN attempt to IV access unsuccessful x 2. PA and primary RN made aware. PA at bedside and aware that fluids have no been administered. PA will reassess meds at this time. Will continue to monitor. Pt understands and agrees with plan.  at bedside.

## 2018-01-30 PROCEDURE — 74011250636 HC RX REV CODE- 250/636: Performed by: INTERNAL MEDICINE

## 2018-01-30 PROCEDURE — 74011250637 HC RX REV CODE- 250/637: Performed by: INTERNAL MEDICINE

## 2018-01-30 PROCEDURE — 65270000015 HC RM PRIVATE ONCOLOGY

## 2018-01-30 PROCEDURE — 74011000258 HC RX REV CODE- 258: Performed by: INTERNAL MEDICINE

## 2018-01-30 RX ORDER — LORAZEPAM 0.5 MG/1
0.5 TABLET ORAL
Status: DISCONTINUED | OUTPATIENT
Start: 2018-01-30 | End: 2018-02-01 | Stop reason: HOSPADM

## 2018-01-30 RX ORDER — BUTALBITAL, ACETAMINOPHEN AND CAFFEINE 50; 325; 40 MG/1; MG/1; MG/1
1 TABLET ORAL
Status: DISCONTINUED | OUTPATIENT
Start: 2018-01-30 | End: 2018-02-01 | Stop reason: HOSPADM

## 2018-01-30 RX ORDER — KETOROLAC TROMETHAMINE 30 MG/ML
15 INJECTION, SOLUTION INTRAMUSCULAR; INTRAVENOUS
Status: DISPENSED | OUTPATIENT
Start: 2018-01-30 | End: 2018-02-01

## 2018-01-30 RX ADMIN — KETOROLAC TROMETHAMINE 15 MG: 30 INJECTION, SOLUTION INTRAMUSCULAR at 08:29

## 2018-01-30 RX ADMIN — Medication 1 CAPSULE: at 12:25

## 2018-01-30 RX ADMIN — Medication 10 ML: at 21:00

## 2018-01-30 RX ADMIN — BUTALBITAL, ACETAMINOPHEN AND CAFFEINE 1 TABLET: 50; 325; 40 TABLET ORAL at 20:58

## 2018-01-30 RX ADMIN — LORAZEPAM 0.5 MG: 0.5 TABLET ORAL at 18:01

## 2018-01-30 RX ADMIN — LEVOTHYROXINE SODIUM 50 MCG: 50 TABLET ORAL at 08:27

## 2018-01-30 RX ADMIN — ERGOCALCIFEROL 50000 UNITS: 1.25 CAPSULE ORAL at 17:59

## 2018-01-30 RX ADMIN — BUTALBITAL, ACETAMINOPHEN AND CAFFEINE 1 TABLET: 50; 325; 40 TABLET ORAL at 12:17

## 2018-01-30 RX ADMIN — ONDANSETRON HYDROCHLORIDE 4 MG: 2 INJECTION, SOLUTION INTRAMUSCULAR; INTRAVENOUS at 06:28

## 2018-01-30 RX ADMIN — FLUOXETINE 40 MG: 20 CAPSULE ORAL at 08:27

## 2018-01-30 RX ADMIN — MEROPENEM 1 G: 1 INJECTION, POWDER, FOR SOLUTION INTRAVENOUS at 04:43

## 2018-01-30 RX ADMIN — KETOROLAC TROMETHAMINE 15 MG: 30 INJECTION, SOLUTION INTRAMUSCULAR at 14:31

## 2018-01-30 RX ADMIN — MEROPENEM 1 G: 1 INJECTION, POWDER, FOR SOLUTION INTRAVENOUS at 20:11

## 2018-01-30 RX ADMIN — ENOXAPARIN SODIUM 40 MG: 40 INJECTION SUBCUTANEOUS at 17:59

## 2018-01-30 RX ADMIN — MEROPENEM 1 G: 1 INJECTION, POWDER, FOR SOLUTION INTRAVENOUS at 12:25

## 2018-01-30 RX ADMIN — Medication 10 ML: at 06:29

## 2018-01-30 RX ADMIN — TRAZODONE HYDROCHLORIDE 100 MG: 100 TABLET ORAL at 20:58

## 2018-01-30 RX ADMIN — KETOROLAC TROMETHAMINE 15 MG: 30 INJECTION, SOLUTION INTRAMUSCULAR at 20:58

## 2018-01-30 RX ADMIN — Medication 10 ML: at 17:59

## 2018-01-30 RX ADMIN — LORAZEPAM 0.5 MG: 0.5 TABLET ORAL at 12:26

## 2018-01-30 RX ADMIN — SODIUM CHLORIDE 125 ML/HR: 900 INJECTION, SOLUTION INTRAVENOUS at 04:44

## 2018-01-30 NOTE — PROGRESS NOTES
Hospitalist Progress Note    NAME: Tia Arredondo   :  1970   MRN:  [id number]       Assessment / Plan:  Pyelonephritis in setting of bladder prolapse:  H/o ESBL E Coli . Sees Dr. Jamila Morin with [facility] for her bladder prolapse and has had sling procedure in the past.  - blood cultures not sent on admission. Urine culture still pending.  - emperic meropenem given prior culture  - d/c IV fluids  Depression with anxiety:  Admits to dealing with anxiety the last week with EtOH-abuse, no e/o withdrawal while here  - con't home prozac with prn trazodone  - Pt requesting ativan, have ordered for now  - psychiatry consulted for assistance with medication adjustment given that she has begun using alcohol to treat her sx  Hypothyroidism: con't home synthroid   Sarcoidosis, connective tissue d/o NOS  SVT:  Sees Fernando  - con't bystolic  B44 deficiency    Code status: Full [facility] [phone])  Prophylaxis: Lovenox     Subjective:     Chief Complaint / Reason for Physician Visit  \"I'm feeling really anxious and having pain\". Discussed with RN events overnight. Review of Systems:  Symptom Y/N Comments  Symptom Y/N Comments   Fever/Chills n   Chest Pain n    Poor Appetite n   Edema n    Cough n   Abdominal Pain y    Sputum n   Joint Pain     SOB/RIZO n   Pruritis/Rash     Nausea/vomit    Tolerating PT/OT     Diarrhea    Tolerating Diet     Constipation    Other       Could NOT obtain due to:      Objective:     VITALS:   Last 24hrs VS reviewed since prior progress note.  Most recent are:  Patient Vitals for the past 24 hrs:   Temp Pulse Resp BP SpO2   18 0235 97.6 °F (36.4 °C) 86 18 121/64 98 %   18 1952 98.2 °F (36.8 °C) 85 18 130/74 96 %   18 1905 98.2 °F (36.8 °C) 90 16 138/89 99 %   18 1520 - (!) 105 18 154/82 98 %   18 1047 98.6 °F (37 °C) (!) 104 18 (!) 163/100 95 %     No intake or output data in the 24 hours ending 18 0815     PHYSICAL EXAM:  General: WD, WN. Alert, cooperative, no acute distress    EENT:  EOMI. Anicteric sclerae. MMM  Resp:  CTA bilaterally, no wheezing or rales. No accessory muscle use  CV:  Regular rhythm,  No edema  GI:  Soft, mildly distended, Non tender.  +Bowel sounds  Neurologic:  Alert and oriented X 3, normal speech,   Psych:   Some insight. Not anxious nor agitated  Skin:  Pale, No rashes. No jaundice    Reviewed most current lab test results and cultures  YES  Reviewed most current radiology test results   YES  Review and summation of old records today    NO  Reviewed patient's current orders and MAR    YES  PMH/SH reviewed - no change compared to H&P  ________________________________________________________________________  Care Plan discussed with:    Comments   Patient x    Family      RN x    Care Manager     Consultant                        Multidiciplinary team rounds were held today with , nursing, pharmacist and clinical coordinator. Patient's plan of care was discussed; medications were reviewed and discharge planning was addressed. ________________________________________________________________________  Total NON critical care TIME:  35 Minutes    Total CRITICAL CARE TIME Spent:   Minutes non procedure based      Comments   >50% of visit spent in counseling and coordination of care x    ________________________________________________________________________  Cintia Valentin MD     Procedures: see electronic medical records for all procedures/Xrays and details which were not copied into this note but were reviewed prior to creation of Plan. LABS:  I reviewed today's most current labs and imaging studies.   Pertinent labs include:  Recent Labs      01/29/18   1103   WBC  5.5   HGB  14.6   HCT  44.4   PLT  183     Recent Labs      01/29/18   1549  01/29/18   1103   NA   --   137   K   --   3.8   CL   --   99   CO2   --   31   GLU   --   101*   BUN   --   7   CREA   --   0.60   CA   --   8.8   MG  2.5*   -- ALB   --   3.8   TBILI   --   0.6   SGOT   --   45*   ALT   --   49       Signed: Tejas Jaquez MD

## 2018-01-30 NOTE — ROUTINE PROCESS
TRANSFER - OUT REPORT:    Verbal report given to Cristóbal Pak RN on Marcia Zavaleta  being transferred to [address] [place] [address] for routine progression of care       Report consisted of patients Situation, Background, Assessment and   Recommendations(SBAR). Information from the following report(s) SBAR, ED Summary, [facility] and Recent Results was reviewed with the receiving nurse. Lines:   Peripheral IV 01/29/18 Left Wrist (Active)   Site Assessment Clean, dry, & intact 1/29/2018  3:45 PM   Phlebitis Assessment 0 1/29/2018  3:45 PM   Infiltration Assessment 0 1/29/2018  3:45 PM   Dressing Status Clean, dry, & intact 1/29/2018  3:45 PM   Dressing Type Transparent 1/29/2018  3:45 PM   Hub Color/Line Status Pink 1/29/2018  3:45 PM        Opportunity for questions and clarification was provided.       Patient transported with:   Meme

## 2018-01-30 NOTE — PROGRESS NOTES
2058: pt c/o back and head pain, no pain medications ordered. Pt asked for toradol she received in ED, said it seemed to work. Paged hospitalist on call, Dr Yudith Espinoza. Received orders for toradol 15 mg IV q12h PRN. Gave to pt at 2105.

## 2018-01-30 NOTE — PROGRESS NOTES
Oncology Nursing Communication Tool  7:22 AM  1/30/2018     Bedside shift change report given to Marsha James RN (incoming nurse) by Eleazar Pires (outgoing nurse) on [facility]. Report included the following information SBAR, Kardex, Intake/Output, MAR and Recent Results. Shift Summary: admitted at shift change. IVF at 125. Called to get orders for toradol. Slept through most of the night. Received IV antibiotics. Issues for physician to address: urology consult         Oncology Shift Note   Admission Date 1/29/2018   Admission Diagnosis Pyelonephritis   Code Status Full Code   Consults IP CONSULT TO UROLOGY  IP CONSULT TO HOSPITALIST      Cardiac Monitoring [] Yes [x] No      Purposeful Hourly Rounding [x] Yes    Jocelyn Score Total Score: 1   Jocelyn score 3 or > [] Bed Alarm [] Avasys [] 1:1 sitter [] Patient refused (Place signed refusal form in chart)      Pain Managed [x] Yes [] No    Key Pain Meds             ASPIRIN/SALICYLAMIDE/CAFFEINE (BC HEADACHE POWDER PO)  (Taking) Take 1 Packet by mouth daily as needed (headache). Influenza Vaccine Received Flu Vaccine for Current Season (usually Sept-March): No    Patient/Guardian Refused (Notify MD): Yes      Oxygen needs? [x] Room air Oxygen @  []1L    []2L    []3L   []4L    []5L   []6L     Use home O2? [] Yes [] No  Perform O2 challenge test using  smartphrase (.oxygenchallenge)      Last bowel movement Last Bowel Movement Date: 01/27/18  bowel movement      Urinary Catheter             LDAs               Peripheral IV 01/29/18 Left Wrist (Active)   Site Assessment Clean, dry, & intact 1/30/2018  3:35 AM   Phlebitis Assessment 0 1/30/2018  3:35 AM   Infiltration Assessment 0 1/30/2018  3:35 AM   Dressing Status Clean, dry, & intact 1/30/2018  3:35 AM   Dressing Type Transparent 1/30/2018  3:35 AM   Hub Color/Line Status Pink; Infusing 1/30/2018  3:35 AM   Action Taken Other (comment) 1/30/2018  3:35 AM Readmission Risk Assessment Tool Score Low Risk            7       Total Score        3 Has Seen PCP in Last 6 Months (Yes=3, No=0)    4 IP Visits Last 12 Months (1-3=4, 4=9, >4=11)        Criteria that do not apply:    . Living with Significant Other. Assisted Living. LTAC. SNF. or   Rehab    Patient Length of Stay (>5 days = 3)    Pt.  Coverage (Medicare=5 , Medicaid, or Self-Pay=4)    Charlson Comorbidity Score (Age + Comorbid Conditions)       Expected Length of Stay - - -   Actual Length of Stay [address]

## 2018-01-30 NOTE — H&P
[facility]  ACUTE CARE HISTORY AND PHYSICAL    Link Garcia.  MR#: [id number]  : 1970  ACCOUNT #: [de-identified]   DATE OF SERVICE: 2018    CHIEF COMPLAINT:  Bilateral flank pains with chills with increased fatigue, tiredness with dysuria since the last 2 days. HISTORY OF PRESENT ILLNESS:  Patient is a 49-year-old female with a history of recurrent UTIs in the past.  As per the patient, the UTIs have been attributed to her bladder prolapse, for which she had a sling procedure done, which was not effective as per the patient. About 2 days back, she had started noticing was having burning of urination and also the urine color was cloudy. Started noticing bilateral flank pains with significant nausea, was unable to eat anything in the last 24 hours because of the nausea. Did have one episode of emesis. No complaints of any chest pain or chest pressure. Denied any complaints of any cough. PAST MEDICAL HISTORY:  Significant for sarcoidosis, no active treatment noted in the history. Hypothyroidism, depression, vitamin B12 deficiency, SVTs (currently on beta blockers for the same), and history of multiple UTIs in the past.    SURGERIES:  The patient has had bladder suspension surgery history, hysterectomy, and history of gastric bypass. As per the patient, she had lost about 75 pounds after that. Also, has had hernia repair. SOCIAL HISTORY:  Denies having drug abuse. Denies having any history of tobacco abuse. Does consume alcohol on social reasons about one glass of wine/small glass of wine for very short periods of time. The patient claimed that last she had drank recently, her last drink about 2 days back, feels fine. Did not complain of any palpitations.     ALLERGIES:  THERE ARE NO KNOWN ALLERGIES:      MEDICATIONS:  The patient's home meds are as follows:  Vitamin B12 1000 I/m injection every 28 days, Desyrel 100 mg p.o. at bedtime, Prozac 40 mg p.o. every day, Excedrin Migraine 1 tab as needed for headaches, Bystolic 5 mg p.o. every day, Synthroid 50 mcg p.o. every day, and vitamin D supplements. PHYSICAL EXAMINATION:  VITAL SIGNS:  Afebrile, pulse rate of 105, blood pressure 154/82, respirations of 18, sats of 98%. HEAD, EYES, EARS, NOSE, THROAT:  Pupils were equal, react to light. Oral mucosa was moist.  NECK:  Supple. LUNGS:  Clear. HEART:  S1, S2 audible. No S3, S4.  ABDOMEN:  Soft. Right flank tenderness was noted. The CVA was not tender to touch, abdominal exam was also benign. EXTREMITIES:  There was no edema. LABORATORY DATA:  Showed a lactic acid of 1.2. Alcohol was negative. Magnesium of 2.5. Drug screen was negative. Urine showed  , large, nitrites, and leukocyte esterase with greater than 100 WBC count. CBC was normal.  CMP was also within normal limits. REVIEW OF SYSTEMS:    CONSTITUTIONAL:  Positive chills. Negative weight loss. Positive fatigue. EYES:  Negative for eye pain, negative visual disturbance. HEENT:  Negative sore throat. Negative ear aches. Negative postnasal drip. PULMONARY:  Negative cough. Negative wheezing. Negative dyspnea on exertion. CARDIOVASCULAR:  Negative chest pain. Negative palpitations. GASTROINTESTINAL:  Positive nausea. Negative diarrhea. Negative vomiting. ENDOCRINE:  Negative. ASSESSMENT AND PLAN:  The patient is a 70-year-old female with a history of multiple urinary tract infections in the past, presents with another episode of a UTI at this time, possibly pyelonephritis. PLAN:    1. Pyelonephritis. Start the patient on Levaquin. Aggressively hydrate the patient. Push fluids. We will aggressively hydrate the patient with normal saline. 2.  Hypothyroidism. Continue with the Synthroid for now. 3.  Depression. Continue with the fluoxetine and Desyrel. 4.  B12 deficiency. Continue with the vitamin B12 shots. 5.  SVT.   Continue with Bystolic 5 mg p.o. every day.    6.  CODE STATUS:  FULL CODE. 7.  Deep venous thrombosis prophylaxis via Lovenox.       MD MARIAJOSE Murphy / VIJAY  D: 01/29/2018 18:51     T: 01/29/2018 19:34  JOB #: [id number]

## 2018-01-30 NOTE — ED NOTES
Pt given medication per MAR. Pt taken to room 1137 via transport. No further questions and/or concerns at this time.

## 2018-01-31 LAB
ANION GAP SERPL CALC-SCNC: 7 MMOL/L (ref 5–15)
BACTERIA SPEC CULT: ABNORMAL
BUN SERPL-MCNC: 11 MG/DL (ref 6–20)
BUN/CREAT SERPL: 19 (ref 12–20)
CALCIUM SERPL-MCNC: 7.7 MG/DL (ref 8.5–10.1)
CC UR VC: ABNORMAL
CHLORIDE SERPL-SCNC: 110 MMOL/L (ref 97–108)
CO2 SERPL-SCNC: 24 MMOL/L (ref 21–32)
CREAT SERPL-MCNC: 0.58 MG/DL (ref 0.55–1.02)
ERYTHROCYTE [DISTWIDTH] IN BLOOD BY AUTOMATED COUNT: 12.1 % (ref 11.5–14.5)
GLUCOSE SERPL-MCNC: 94 MG/DL (ref 65–100)
HCT VFR BLD AUTO: 38.3 % (ref 35–47)
HGB BLD-MCNC: 12.2 G/DL (ref 11.5–16)
MCH RBC QN AUTO: 30 PG (ref 26–34)
MCHC RBC AUTO-ENTMCNC: 31.9 G/DL (ref 30–36.5)
MCV RBC AUTO: 94.3 FL (ref 80–99)
NRBC # BLD: 0 K/UL (ref 0–0.01)
NRBC BLD-RTO: 0 PER 100 WBC
PLATELET # BLD AUTO: 134 K/UL (ref 150–400)
PMV BLD AUTO: 9.5 FL (ref 8.9–12.9)
POTASSIUM SERPL-SCNC: 3.6 MMOL/L (ref 3.5–5.1)
RBC # BLD AUTO: 4.06 M/UL (ref 3.8–5.2)
SERVICE CMNT-IMP: ABNORMAL
SODIUM SERPL-SCNC: 141 MMOL/L (ref 136–145)
WBC # BLD AUTO: 3.4 K/UL (ref 3.6–11)

## 2018-01-31 PROCEDURE — 80048 BASIC METABOLIC PNL TOTAL CA: CPT | Performed by: INTERNAL MEDICINE

## 2018-01-31 PROCEDURE — 74011250637 HC RX REV CODE- 250/637: Performed by: INTERNAL MEDICINE

## 2018-01-31 PROCEDURE — 74011000258 HC RX REV CODE- 258: Performed by: INTERNAL MEDICINE

## 2018-01-31 PROCEDURE — 74011250636 HC RX REV CODE- 250/636: Performed by: INTERNAL MEDICINE

## 2018-01-31 PROCEDURE — 85027 COMPLETE CBC AUTOMATED: CPT | Performed by: INTERNAL MEDICINE

## 2018-01-31 PROCEDURE — 65270000015 HC RM PRIVATE ONCOLOGY

## 2018-01-31 PROCEDURE — 36415 COLL VENOUS BLD VENIPUNCTURE: CPT | Performed by: INTERNAL MEDICINE

## 2018-01-31 RX ORDER — ACETAMINOPHEN 325 MG/1
650 TABLET ORAL
Status: DISCONTINUED | OUTPATIENT
Start: 2018-01-31 | End: 2018-02-01 | Stop reason: HOSPADM

## 2018-01-31 RX ORDER — METRONIDAZOLE 250 MG/1
500 TABLET ORAL
Status: DISCONTINUED | OUTPATIENT
Start: 2018-01-31 | End: 2018-02-01 | Stop reason: HOSPADM

## 2018-01-31 RX ORDER — CEPHALEXIN 250 MG/1
500 CAPSULE ORAL 4 TIMES DAILY
Status: DISCONTINUED | OUTPATIENT
Start: 2018-01-31 | End: 2018-02-01 | Stop reason: HOSPADM

## 2018-01-31 RX ADMIN — ONDANSETRON HYDROCHLORIDE 4 MG: 2 INJECTION, SOLUTION INTRAMUSCULAR; INTRAVENOUS at 08:18

## 2018-01-31 RX ADMIN — KETOROLAC TROMETHAMINE 15 MG: 30 INJECTION, SOLUTION INTRAMUSCULAR at 16:53

## 2018-01-31 RX ADMIN — FLUOXETINE 40 MG: 20 CAPSULE ORAL at 08:18

## 2018-01-31 RX ADMIN — LORAZEPAM 0.5 MG: 0.5 TABLET ORAL at 19:53

## 2018-01-31 RX ADMIN — LEVOTHYROXINE SODIUM 50 MCG: 50 TABLET ORAL at 08:18

## 2018-01-31 RX ADMIN — TRAZODONE HYDROCHLORIDE 100 MG: 100 TABLET ORAL at 20:59

## 2018-01-31 RX ADMIN — Medication 1 CAPSULE: at 08:18

## 2018-01-31 RX ADMIN — KETOROLAC TROMETHAMINE 15 MG: 30 INJECTION, SOLUTION INTRAMUSCULAR at 09:48

## 2018-01-31 RX ADMIN — Medication 10 ML: at 07:12

## 2018-01-31 RX ADMIN — BUTALBITAL, ACETAMINOPHEN AND CAFFEINE 1 TABLET: 50; 325; 40 TABLET ORAL at 02:57

## 2018-01-31 RX ADMIN — BUTALBITAL, ACETAMINOPHEN AND CAFFEINE 1 TABLET: 50; 325; 40 TABLET ORAL at 09:47

## 2018-01-31 RX ADMIN — NEBIVOLOL HYDROCHLORIDE 5 MG: 2.5 TABLET ORAL at 10:09

## 2018-01-31 RX ADMIN — LORAZEPAM 0.5 MG: 0.5 TABLET ORAL at 07:11

## 2018-01-31 RX ADMIN — BUTALBITAL, ACETAMINOPHEN AND CAFFEINE 1 TABLET: 50; 325; 40 TABLET ORAL at 16:53

## 2018-01-31 RX ADMIN — METRONIDAZOLE 500 MG: 250 TABLET ORAL at 13:25

## 2018-01-31 RX ADMIN — ENOXAPARIN SODIUM 40 MG: 40 INJECTION SUBCUTANEOUS at 17:30

## 2018-01-31 RX ADMIN — Medication 10 ML: at 20:59

## 2018-01-31 RX ADMIN — MEROPENEM 1 G: 1 INJECTION, POWDER, FOR SOLUTION INTRAVENOUS at 11:58

## 2018-01-31 RX ADMIN — CEPHALEXIN 500 MG: 250 CAPSULE ORAL at 21:00

## 2018-01-31 RX ADMIN — LORAZEPAM 0.5 MG: 0.5 TABLET ORAL at 13:25

## 2018-01-31 RX ADMIN — MEROPENEM 1 G: 1 INJECTION, POWDER, FOR SOLUTION INTRAVENOUS at 04:10

## 2018-01-31 RX ADMIN — KETOROLAC TROMETHAMINE 15 MG: 30 INJECTION, SOLUTION INTRAMUSCULAR at 02:57

## 2018-01-31 NOTE — PROGRESS NOTES
80-year-old female with a history of recurrent UTIs in the past.  As per the patient, the UTIs have been attributed to her bladder prolapse, for which she had a sling procedure done, which was not effective as per the patient. Patient states she has had home IV antibiotics in the past - per chart review, patient had [facility] and [facility] - [facility]. MD awaiting final urine culture reports to see if patient will require home IV antibiotics again. Patient is independent at home - does not require any DME. Patient was provided multiple resources for outpatient psychiatric follow-up as well as alcohol rehab. She will review information and inform CM of her choices so CM can make follow-up appts. She may need to follow-up with Psychiatry/Substance Abuse through insurance provider - patient to check on coverage options. Care Management Interventions  PCP Verified by CM: Yes Bigg Granados MD)  Mode of Transport at Discharge: Other (see comment) (Family)  Transition of Care Consult (CM Consult):  Other (Consult for psychiatric follow-up and ETOH counseling)  MyChart Signup: No  Discharge Durable Medical Equipment: No  Physical Therapy Consult: No  Occupational Therapy Consult: No  Speech Therapy Consult: No  Current Support Network: Lives with Spouse, Own Home (spouse and 3 children - ages 6, 15 and 24)  Confirm Follow Up Transport: Family  Plan discussed with Pt/Family/Caregiver: Yes  Freedom of Choice Offered: Yes (Patient provided with list of ETOH and psych resources/providers for [address] - will make follow-up appt per her request)  Discharge Location  Discharge Placement:  (Patient anticipates discharge home w/ family)    Jaya Jones RN, BSN, ACM   - Medical Oncology  [phone]

## 2018-01-31 NOTE — PROGRESS NOTES
Oncology Nursing Communication Tool  7:06 PM  1/30/2018     Bedside shift change report given to Jeffrey Elliott RN (incoming nurse) by Digna Olea (outgoing nurse) on [facility]. Report included the following information SBAR, Kardex, Intake/Output, MAR and Recent Results. Shift Summary:       Issues for physician to address: Oncology Shift Note   Admission Date 1/29/2018   Admission Diagnosis Pyelonephritis   Code Status Full Code   Consults IP CONSULT TO UROLOGY  IP CONSULT TO PSYCHIATRY      Cardiac Monitoring [] Yes [] No      Purposeful Hourly Rounding [] Yes    Jocelyn Score Total Score: 1   Jocelyn score 3 or > [] Bed Alarm [] Avasys [] 1:1 sitter [] Patient refused (Place signed refusal form in chart)      Pain Managed [] Yes [] No    Key Pain Meds             ASPIRIN/SALICYLAMIDE/CAFFEINE (BC HEADACHE POWDER PO)  (Taking) Take 1 Packet by mouth daily as needed (headache). Influenza Vaccine Received Flu Vaccine for Current Season (usually Sept-March): No    Patient/Guardian Refused (Notify MD): Yes      Oxygen needs? [] Room air Oxygen @  []1L    []2L    []3L   []4L    []5L   []6L     Use home O2? [] Yes [] No  Perform O2 challenge test using  smartphrase (.oxygenchallenge)      Last bowel movement Last Bowel Movement Date: 01/27/18  bowel movement      Urinary Catheter             LDAs               Peripheral IV 01/29/18 Left Wrist (Active)   Site Assessment Clean, dry, & intact 1/30/2018  3:00 PM   Phlebitis Assessment 0 1/30/2018  3:00 PM   Infiltration Assessment 0 1/30/2018  3:00 PM   Dressing Status Clean, dry, & intact 1/30/2018  3:00 PM   Dressing Type Tape;Transparent 1/30/2018  3:00 PM   Hub Color/Line Status Pink; Infusing 1/30/2018  3:00 PM   Action Taken Other (comment) 1/30/2018  3:35 AM                         Readmission Risk Assessment Tool Score Low Risk            7       Total Score        3 Has Seen PCP in Last 6 Months (Yes=3, No=0)    4 IP Visits Last 12 Months (1-3=4, 4=9, >4=11)        Criteria that do not apply:    . Living with Significant Other. Assisted Living. LTAC. SNF. or   Rehab    Patient Length of Stay (>5 days = 3)    Pt.  Coverage (Medicare=5 , Medicaid, or Self-Pay=4)    Charlson Comorbidity Score (Age + Comorbid Conditions)       Expected Length of Stay 3d 0h   Actual Length of Stay [address]

## 2018-01-31 NOTE — PROGRESS NOTES
Oncology Nursing Communication Tool  8:18 AM  1/31/2018     Bedside shift change report given to Mariela Gage RN (incoming nurse) by Mabel Portillo (outgoing nurse) on [facility]. Report included the following information SBAR, Kardex, Intake/Output, MAR and Recent Results. Shift Summary: received IV merrem x2. Had one episode of incontinent stool. Requested toradol and fioricet x2, ativan given at AM shift change. C/o nausea with meals. Urinary pain better. Issues for physician to address: d/c         Oncology Shift Note   Admission Date 1/29/2018   Admission Diagnosis Pyelonephritis   Code Status Full Code   Consults IP CONSULT TO UROLOGY  IP CONSULT TO PSYCHIATRY      Cardiac Monitoring [] Yes [x] No      Purposeful Hourly Rounding [x] Yes    Jocelyn Score Total Score: 1   Jocelyn score 3 or > [] Bed Alarm [] Avasys [] 1:1 sitter [] Patient refused (Place signed refusal form in chart)      Pain Managed [x] Yes [] No    Key Pain Meds             ASPIRIN/SALICYLAMIDE/CAFFEINE (BC HEADACHE POWDER PO)  (Taking) Take 1 Packet by mouth daily as needed (headache). Influenza Vaccine Received Flu Vaccine for Current Season (usually Sept-March): No    Patient/Guardian Refused (Notify MD): Yes      Oxygen needs? [x] Room air Oxygen @  []1L    []2L    []3L   []4L    []5L   []6L     Use home O2? [] Yes [] No  Perform O2 challenge test using  smartphrase (.oxygenchallenge)      Last bowel movement Last Bowel Movement Date: 01/30/18  bowel movement      Urinary Catheter             LDAs               Peripheral IV 01/31/18 Right Wrist (Active)   Site Assessment Clean, dry, & intact 1/31/2018  7:15 AM   Phlebitis Assessment 0 1/31/2018  7:15 AM   Infiltration Assessment 0 1/31/2018  7:15 AM   Dressing Status Clean, dry, & intact 1/31/2018  7:15 AM   Dressing Type Tape;Transparent 1/31/2018  7:15 AM   Hub Color/Line Status Yellow; Flushed;Patent; Infusing 1/31/2018  7:15 AM   Action Taken Dressing reinforced 1/31/2018  7:15 AM                         Readmission Risk Assessment Tool Score Low Risk            7       Total Score        3 Has Seen PCP in Last 6 Months (Yes=3, No=0)    4 IP Visits Last 12 Months (1-3=4, 4=9, >4=11)        Criteria that do not apply:    . Living with Significant Other. Assisted Living. LTAC. SNF. or   Rehab    Patient Length of Stay (>5 days = 3)    Pt.  Coverage (Medicare=5 , Medicaid, or Self-Pay=4)    Charlson Comorbidity Score (Age + Comorbid Conditions)       Expected Length of Stay 3d 0h   Actual Length of Stay 2          Dai Rashid

## 2018-01-31 NOTE — CONSULTS
PSYCHIATRIC CONSULTATION                         IDENTIFICATION:    Patient Name  Kirill Kelsey   Date of Birth 1970   [facility] [id number]   Medical Record Number  [id number]      Age  52 y.o. PCP Gregoria Ponce MD   Admit date:  1/29/2018    Room Number  1137/01  @ [facility]   Date of Service  1/30/2018            HISTORY         REASON FOR HOSPITALIZATION/CONSULTATION:  A psychiatric consultation was requested by Juan Luis Benavidez MD to evaluate or provide advice/opinion related to evaluating for alcohol abuse and depression   CC: depressed    HISTORY OF PRESENT ILLNESS:    The patient, Kirill Kelsey, is a 52 y.o. WHITE [place]  female with a past psychiatric history significant for depression  who was admitted to the medical floor for the treatment of Pyelonephritis. She stated she feels sad and depressed and she is not  Feeling well and unhappy about herself drinking too much and she is not happy about her  trying to push her to go to rehab , she is anxious and in tears and she is not feeling suicidal and no homicidal ideation , she stated she started to drink more and more for  The last one year and she started outpatient therapy but she was not  consistent as she was medically ill     Pt seen today for evaluation. ALLERGIES:  No Known Allergies   MEDICATIONS PRIOR TO ADMISSION:  Prescriptions Prior to Admission   Medication Sig    cyanocobalamin (VITAMIN B12) 1,000 mcg/mL injection 1,000 mcg by IntraMUSCular route every twenty-eight (28) days.  traZODone (DESYREL) 100 mg tablet Take 100 mg by mouth nightly as needed for Sleep.  FLUoxetine (PROZAC) 40 mg capsule Take 40 mg by mouth daily.  ASPIRIN/SALICYLAMIDE/CAFFEINE (BC HEADACHE POWDER PO) Take 1 Packet by mouth daily as needed (headache).  nebivolol (BYSTOLIC) 5 mg tablet Take 5 mg by mouth daily as needed ('rapid heart beat').     levothyroxine (SYNTHROID) 50 mcg tablet Take 50 mcg by mouth Daily (before breakfast).  ergocalciferol (VITAMIN D2) 50,000 unit capsule Take 50,000 Units by mouth two (2) times a week. 'On Tuesdays and Thursdays or Fridays.'      PAST MEDICAL HISTORY:  Past Medical History:   Diagnosis Date    Anemia     Anxiety     Arthritis     osteoarthritis    [facility]immune [facility]     Bowel trouble     Burning with urination     Chronic pain     Connective tissue disorder ([facility]     Depression     Dyspepsia and other specified disorders of function of stomach     Headache     Hypothyroid     Osteoporosis     Sarcoidosis      Past Surgical History:   Procedure Laterality Date    HX BLADDER SUSPENSION      HX CATARACT REMOVAL  12/14    HX GASTRIC BYPASS  8/1999    HX HERNIA REPAIR      HX HYSTERECTOMY  2007    NEUROLOGICAL PROCEDURE UNLISTED  2003, 2004    back surgery       SOCIAL HISTORY:    Social History     Social History    Marital status:      Spouse name: N/A    Number of children: N/A    Years of education: N/A     Occupational History    Not on file. Social History Main Topics    Smoking status: Never Smoker    Smokeless tobacco: Never Used    Alcohol use 0.6 oz/week     1 Glasses of wine per week      Comment: 1 glass of wine/day    Drug use: No    Sexual activity: Not on file     Other Topics Concern    Not on file     Social History Narrative      FAMILY HISTORY: History reviewed. No pertinent family history. Family History   Problem Relation Age of Onset    Hypertension Father     Diabetes Father     Diabetes Paternal Aunt     Heart Disease Maternal Grandmother     Stroke Maternal Grandmother        REVIEW of SYSTEMS:   History obtained from the patient  Pertinent items are noted in the History of Present Illness. All other Systems reviewed and are considered negative. MENTAL STATUS EXAM & VITALS       MENTAL STATUS EXAM (MSE):    MSE FINDINGS ARE WITHIN NORMAL LIMITS (WNL) UNLESS OTHERWISE STATED BELOW. Orientation oriented to time, place and person   Vital Signs (BP,Pulse, Temp) See below (reviewed 1/30/2018); vital signs are WNL if not listed below. Gait and Station Stable, no ataxia   Abnormal Muscular Movements/Tone/Behavior No EPS, no Tardive Dyskinesia, no abnormal muscular movements; wnl tone   Relations cooperative   General Appearance:  age appropriate   Language No aphasia or dysarthria   Speech:  normal pitch and normal volume   Thought Processes logical, wnl rate of thoughts, good abstract reasoning and computation   Thought Associations logical   Thought Content free of delusions   Suicidal Ideations no plan    Homicidal Ideations no plan    Mood:  anxious   Affect:  depressed   Memory recent  adequate   Memory remote:  adequate   Concentration/Attention:  adequate   Fund of Knowledge average   Insight:  good   Reliability fair   Judgment:  fair            VITALS:     Patient Vitals for the past 24 hrs:   Temp Pulse Resp BP SpO2   01/30/18 1953 98.2 °F (36.8 °C) 97 18 137/80 98 %   01/30/18 1655 98.3 °F (36.8 °C) 81 18 134/87 98 %   01/30/18 0830 98.2 °F (36.8 °C) 89 18 126/87 96 %   01/30/18 0235 97.6 °F (36.4 °C) 86 18 121/64 98 %              DATA     LABORATORY DATA:  Labs Reviewed   CULTURE, URINE - Abnormal; Notable for the following:        Result Value    Culture result: GRAM NEGATIVE RODS (*)     All other components within normal limits   CBC WITH AUTOMATED DIFF - Abnormal; Notable for the following:     NEUTROPHILS 79 (*)     All other components within normal limits   METABOLIC PANEL, COMPREHENSIVE - Abnormal; Notable for the following:     Glucose 101 (*)     AST (SGOT) 45 (*)     Alk.  phosphatase 172 (*)     All other components within normal limits   URINALYSIS W/ REFLEX CULTURE - Abnormal; Notable for the following:     Appearance CLOUDY (*)     Protein TRACE (*)     Ketone TRACE (*)     Blood TRACE (*)     Nitrites POSITIVE (*)     Leukocyte Esterase LARGE (*)     WBC >100 (*) Bacteria 4+ (*)     UA:UC IF INDICATED URINE CULTURE ORDERED (*)     All other components within normal limits   MAGNESIUM - Abnormal; Notable for the following:     Magnesium 2.5 (*)     All other components within normal limits   LACTIC ACID   ETHYL ALCOHOL   DRUG SCREEN, URINE     Admission on 01/29/2018   Component Date Value Ref Range Status    WBC 01/29/2018 5.5  3.6 - 11.0 K/uL Final    RBC 01/29/2018 4.86  3.80 - 5.20 M/uL Final    HGB 01/29/2018 14.6  11.5 - 16.0 g/dL Final    HCT 01/29/2018 44.4  35.0 - 47.0 % Final    MCV 01/29/2018 91.4  80.0 - 99.0 FL Final    MCH 01/29/2018 30.0  26.0 - 34.0 PG Final    MCHC 01/29/2018 32.9  30.0 - 36.5 g/dL Final    RDW 01/29/2018 12.1  11.5 - 14.5 % Final    PLATELET 95/12/0756 522  150 - 400 K/uL Final    MPV 01/29/2018 9.3  8.9 - 12.9 FL Final    NRBC 01/29/2018 0.0  0  WBC Final    ABSOLUTE NRBC 01/29/2018 0.00  0.00 - 0.01 K/uL Final    NEUTROPHILS 01/29/2018 79* 32 - 75 % Final    LYMPHOCYTES 01/29/2018 14  12 - 49 % Final    MONOCYTES 01/29/2018 6  5 - 13 % Final    EOSINOPHILS 01/29/2018 1  0 - 7 % Final    BASOPHILS 01/29/2018 0  0 - 1 % Final    IMMATURE GRANULOCYTES 01/29/2018 0  0.0 - 0.5 % Final    ABS. NEUTROPHILS 01/29/2018 4.3  1.8 - 8.0 K/UL Final    ABS. LYMPHOCYTES 01/29/2018 0.8  0.8 - 3.5 K/UL Final    ABS. MONOCYTES 01/29/2018 0.3  0.0 - 1.0 K/UL Final    ABS. EOSINOPHILS 01/29/2018 0.1  0.0 - 0.4 K/UL Final    ABS. BASOPHILS 01/29/2018 0.0  0.0 - 0.1 K/UL Final    ABS. IMM.  GRANS. 01/29/2018 0.0  0.00 - 0.04 K/UL Final    DF 01/29/2018 SMEAR SCANNED    Final    RBC COMMENTS 01/29/2018 NORMOCYTIC, NORMOCHROMIC    Final    Sodium 01/29/2018 137  136 - 145 mmol/L Final    Potassium 01/29/2018 3.8  3.5 - 5.1 mmol/L Final    Chloride 01/29/2018 99  97 - 108 mmol/L Final    CO2 01/29/2018 31  21 - 32 mmol/L Final    Anion gap 01/29/2018 7  5 - 15 mmol/L Final    Glucose 01/29/2018 101* 65 - 100 mg/dL Final    BUN 01/29/2018 7  6 - 20 MG/DL Final    Creatinine 01/29/2018 0.60  0.55 - 1.02 MG/DL Final    BUN/Creatinine ratio 01/29/2018 12  12 - 20   Final    GFR est AA 01/29/2018 >60  >60 ml/min/1.73m2 Final    GFR est non-AA 01/29/2018 >60  >60 ml/min/1.73m2 Final    Calcium 01/29/2018 8.8  8.5 - 10.1 MG/DL Final    Bilirubin, total 01/29/2018 0.6  0.2 - 1.0 MG/DL Final    ALT (SGPT) 01/29/2018 49  12 - 78 U/L Final    AST (SGOT) 01/29/2018 45* 15 - 37 U/L Final    Alk.  phosphatase 01/29/2018 172* 45 - 117 U/L Final    Protein, total 01/29/2018 6.9  6.4 - 8.2 g/dL Final    Albumin 01/29/2018 3.8  3.5 - 5.0 g/dL Final    Globulin 01/29/2018 3.1  2.0 - 4.0 g/dL Final    A-G Ratio 01/29/2018 1.2  1.1 - 2.2   Final    Color 01/29/2018 YELLOW/STRAW    Final    Appearance 01/29/2018 CLOUDY* CLEAR   Final    Specific gravity 01/29/2018 1.015  1.003 - 1.030   Final    pH (UA) 01/29/2018 6.5  5.0 - 8.0   Final    Protein 01/29/2018 TRACE* NEG mg/dL Final    Glucose 01/29/2018 NEGATIVE   NEG mg/dL Final    Ketone 01/29/2018 TRACE* NEG mg/dL Final    Bilirubin 01/29/2018 NEGATIVE   NEG   Final    Blood 01/29/2018 TRACE* NEG   Final    Urobilinogen 01/29/2018 1.0  0.2 - 1.0 EU/dL Final    Nitrites 01/29/2018 POSITIVE* NEG   Final    Leukocyte Esterase 01/29/2018 LARGE* NEG   Final    WBC 01/29/2018 >100* 0 - 4 /hpf Final    RBC 01/29/2018 5-10  0 - 5 /hpf Final    Epithelial cells 01/29/2018 FEW  FEW /lpf Final    Bacteria 01/29/2018 4+* NEG /hpf Final    UA:UC IF INDICATED 01/29/2018 URINE CULTURE ORDERED* CNI   Final    Special Requests: 01/29/2018     Preliminary                    Value:NO SPECIAL REQUESTS  Reflexed from [id number]      [place] Count 01/29/2018     Preliminary                    Value:>100,000  COLONIES/mL      Culture result: 01/29/2018 GRAM NEGATIVE RODS*   Preliminary    Magnesium 01/29/2018 2.5* 1.6 - 2.4 mg/dL Final    Lactic acid 01/29/2018 1.2  0.4 - 2.0 MMOL/L Final    ALCOHOL(ETHYL),SERUM 01/29/2018 <10  <10 MG/DL Final    AMPHETAMINES 01/29/2018 NEGATIVE   NEG   Final    BARBITURATES 01/29/2018 NEGATIVE   NEG   Final    BENZODIAZEPINES 01/29/2018 NEGATIVE   NEG   Final    COCAINE 01/29/2018 NEGATIVE   NEG   Final    METHADONE 01/29/2018 NEGATIVE   NEG   Final    OPIATES 01/29/2018 NEGATIVE   NEG   Final    PCP(PHENCYCLIDINE) 01/29/2018 NEGATIVE   NEG   Final    THC (TH-CANNABINOL) 01/29/2018 NEGATIVE   NEG   Final    Drug screen comment 01/29/2018 (NOTE)   Final        RADIOLOGY REPORTS:    Results from Hospital Encounter encounter on 11/16/17   XR CHEST PORT   Narrative INDICATION:   Sepsis    EXAM:  AP CHEST RADIOGRAPH    COMPARISON: November 22, 2016    FINDINGS:    AP portable view of the chest demonstrates a normal cardiomediastinal  silhouette. The lungs are adequately expanded. There is no edema, effusion,  consolidation, or pneumothorax. The osseous structures are unremarkable. Impression IMPRESSION:  No acute process. Ct Abd Pelv W Cont    Result Date: 11/16/2017  INDICATION: R/o pyelonephritis COMPARISON: August 27, 2015 TECHNIQUE: Following the uneventful intravenous administration of 100 cc Isovue-370, thin axial images were obtained through the abdomen and pelvis. Coronal and sagittal reconstructions were generated. Oral contrast was not administered. CT dose reduction was achieved through use of a standardized protocol tailored for this examination and automatic exposure control for dose modulation. FINDINGS: LUNG BASES: No abnormality. LIVER: No mass or biliary dilatation. GALLBLADDER: Surgically absent with postoperative biliary prominence. SPLEEN: No enlargement or lesion. PANCREAS: No mass or ductal dilatation. ADRENALS: No mass. KIDNEYS: No mass, calculus, or hydronephrosis. GI TRACT:  Remote gastric bypass. No evidence of bowel obstruction, unchanged prominent distal jejunal bypass loop PERITONEUM: No free air or free fluid.  APPENDIX: Unremarkable. RETROPERITONEUM: No lymphadenopathy or aortic aneurysm. ADDITIONAL COMMENTS: N/A. URINARY BLADDER: Unremarkable. REPRODUCTIVE ORGANS: Surgically absent. LYMPH NODES:  None enlarged. FREE FLUID:  None. BONES: No destructive bone lesion. ADDITIONAL COMMENTS: N/A. IMPRESSION: Remote gastric bypass. No acute abnormality. Xr Chest Port    Result Date: 11/16/2017  INDICATION:   Sepsis EXAM:  AP CHEST RADIOGRAPH COMPARISON: November 22, 2016 FINDINGS: AP portable view of the chest demonstrates a normal cardiomediastinal silhouette. The lungs are adequately expanded. There is no edema, effusion, consolidation, or pneumothorax. The osseous structures are unremarkable. IMPRESSION: No acute process.               MEDICATIONS       ALL MEDICATIONS  Current Facility-Administered Medications   Medication Dose Route Frequency    LORazepam (ATIVAN) tablet 0.5 mg  0.5 mg Oral Q6H PRN    ketorolac (TORADOL) injection 15 mg  15 mg IntraVENous Q6H PRN    butalbital-acetaminophen-caffeine (FIORICET, ESGIC) -40 mg per tablet 1 Tab  1 Tab Oral Q6H PRN    lactobac ac& pc-s.therm-b.anim (KATT Q/RISAQUAD)  1 Cap Oral DAILY    sodium chloride (NS) flush 5-10 mL  5-10 mL IntraVENous Q8H    sodium chloride (NS) flush 5-10 mL  5-10 mL IntraVENous PRN    ondansetron (ZOFRAN) injection 4 mg  4 mg IntraVENous Q4H PRN    enoxaparin (LOVENOX) injection 40 mg  40 mg SubCUTAneous Q24H    cyanocobalamin (VITAMIN B12) injection 1,000 mcg  1,000 mcg IntraMUSCular Q28D    aspirin (ASPIRIN) tablet 650 mg  650 mg Oral DAILY PRN    FLUoxetine (PROzac) capsule 40 mg  40 mg Oral DAILY    traZODone (DESYREL) tablet 100 mg  100 mg Oral QHS PRN    nebivolol (BYSTOLIC) tablet 5 mg  5 mg Oral DAILY PRN    ergocalciferol (ERGOCALCIFEROL) capsule 50,000 Units  50,000 Units Oral EVERY TU & FRI    levothyroxine (SYNTHROID) tablet 50 mcg  50 mcg Oral ACB    meropenem (MERREM) 1 g in 0.9% sodium chloride (MBP/ADV) 50 mL  1 g IntraVENous Q8H      SCHEDULED MEDICATIONS  Current Facility-Administered Medications   Medication Dose Route Frequency    lactobac ac& pc-s.therm-b.anim (KATT Q/RISAQUAD)  1 Cap Oral DAILY    sodium chloride (NS) flush 5-10 mL  5-10 mL IntraVENous Q8H    enoxaparin (LOVENOX) injection 40 mg  40 mg SubCUTAneous Q24H    cyanocobalamin (VITAMIN B12) injection 1,000 mcg  1,000 mcg IntraMUSCular Q28D    FLUoxetine (PROzac) capsule 40 mg  40 mg Oral DAILY    ergocalciferol (ERGOCALCIFEROL) capsule 50,000 Units  50,000 Units Oral EVERY TU & FRI    levothyroxine (SYNTHROID) tablet 50 mcg  50 mcg Oral ACB    meropenem (MERREM) 1 g in 0.9% sodium chloride (MBP/ADV) 50 mL  1 g IntraVENous Q8H                ASSESSMENT & PLAN        The patient Shandra Lopez is a 52 y.o.  female who presents at this time for treatment of the following diagnoses: Alcohol abuse , Major depressive disorder   Assessment: Sadness , Anxiety and drinking Alcohol     Plan:Continue Prozac 40 mgpo daily , Latuda 20 mg po pm with dinner as Adjunctive antidepressant and mood stabilizer     Disposition: need outpatient Alcohol abuse program and individual counseling and psychiatric follow up , Can be referred to IOP at [facility] doctor   Thank you very much for the opportunity to participate in the care of your patient. I will continue to follow up with patient as deemed appropriate. I have reviewed admission (and previous/old) labs and medical tests in the EHR and or transferring hospital documents. I will continue to order blood tests/labs and diagnostic tests as deemed appropriate and review results as they become available (see orders for details). I have reviewed old psychiatric and medical records available in the EHR. I Will order additional psychiatric records from other institutions to further elucidate the nature of patient's psychopathology and review once available.     I will gather additional collateral information from friends, family and o/p treatment team to further elucidate the nature of patient's psychopathology and baselline level of psychiatric functioning.                      SIGNED:    Luis Bertrand MD  1/30/2018

## 2018-01-31 NOTE — PROGRESS NOTES
Dr Pamela Lozano aware of loose stools,Flagyl ordered. Patient states that she has IBS and has loose stools due to this and takes Flagly lfor this.

## 2018-01-31 NOTE — PROGRESS NOTES
Jenny Noriega  [age]  MRN [id number]  Meropenem 1gm IV q8h for suspected ESBL UTI  Urine w/ >100k E.coli (not ESBL), pan-sensitive  Please consider changing Meropenem to Ceftriaxone 1gm IV q24h or oral therapy with any of the following:  Amoxicillin, Cephalexin, Ciprofloxacin, Levofloxacin, TMP/SMX

## 2018-01-31 NOTE — PROGRESS NOTES
Hospitalist Progress Note    NAME: Keri Hernandez   :  1970   MRN:  [id number]       Assessment / Plan:  Pyelonephritis in setting of bladder prolapse:  H/o ESBL E Coli . Sees Dr. Rashaad Bourne with [facility] for her bladder prolapse and has had sling procedure in the past.  - blood cultures not sent on admission. Urine culture >[id number] pansensitive E Coli.  - change emperic meropenem to keflex  - d/c IV fluids  Depression with anxiety:  Admits to dealing with anxiety the last week with EtOH-abuse, no e/o withdrawal while here  - con't home prozac with prn trazodone  - psychiatry consult appreciated, CM consult placed for outpatient resources per their recommendations  Headache: con't fioricet  Hypothyroidism: con't home synthroid   Sarcoidosis, connective tissue d/o NOS  SVT:  Sees Fernando  - con't bystolic  G58 deficiency     Code status: Full [facility] [phone])  Prophylaxis: Lovenox     Subjective:     Chief Complaint / Reason for Physician Visit  \"I had a large bowel movement this morning\". Pt says she has IBS and occasionally has diarrheal episodes at home. Discussed with RN events overnight. Review of Systems:  Symptom Y/N Comments  Symptom Y/N Comments   Fever/Chills n   Chest Pain n    Poor Appetite n   Edema n    Cough n   Abdominal Pain n    Sputum n   Joint Pain     SOB/RIZO n   Pruritis/Rash     Nausea/vomit    Tolerating PT/OT     Diarrhea    Tolerating Diet     Constipation    Other       Could NOT obtain due to:      Objective:     VITALS:   Last 24hrs VS reviewed since prior progress note. Most recent are:  Patient Vitals for the past 24 hrs:   Temp Pulse Resp BP SpO2   18 0717 97.9 °F (36.6 °C) 84 18 112/68 97 %   18 1953 98.2 °F (36.8 °C) 97 18 137/80 98 %   18 1655 98.3 °F (36.8 °C) 81 18 134/87 98 %   18 0830 98.2 °F (36.8 °C) 89 18 126/87 96 %     No intake or output data in the 24 hours ending 18 0755     PHYSICAL EXAM:  General: WD, WN. Alert, cooperative, no acute distress    EENT:  EOMI. Anicteric sclerae. MMM  Resp:  CTA bilaterally, no wheezing or rales. No accessory muscle use  CV:  Regular  rhythm,  No edema  GI:  Soft, mildly distended, bilateral lower quadrant tenderness.  +Bowel sounds  Neurologic:  Alert and oriented X 3, normal speech,   Psych:   Fair insight. Not anxious nor agitated  Skin:  No rashes. No jaundice    Reviewed most current lab test results and cultures  YES  Reviewed most current radiology test results   YES  Review and summation of old records today    NO  Reviewed patient's current orders and MAR    YES  PMH/SH reviewed - no change compared to H&P  ________________________________________________________________________  Care Plan discussed with:    Comments   Patient x    Family      RN x    Care Manager     Consultant                        Multidiciplinary team rounds were held today with , nursing, pharmacist and clinical coordinator. Patient's plan of care was discussed; medications were reviewed and discharge planning was addressed. ________________________________________________________________________  Total NON critical care TIME:  25 Minutes    Total CRITICAL CARE TIME Spent:   Minutes non procedure based      Comments   >50% of visit spent in counseling and coordination of care x    ________________________________________________________________________  Stu Aldridge MD     Procedures: see electronic medical records for all procedures/Xrays and details which were not copied into this note but were reviewed prior to creation of Plan. LABS:  I reviewed today's most current labs and imaging studies.   Pertinent labs include:  Recent Labs      01/31/18   0259  01/29/18   1103   WBC  3.4*  5.5   HGB  12.2  14.6   HCT  38.3  44.4   PLT  134*  183     Recent Labs      01/31/18   0259  01/29/18   1549  01/29/18   1103   NA  141   --   137   K  3.6   --   3.8   CL  110*   --   99   CO2  24 --   31   GLU  94   --   101*   BUN  11   --   7   CREA  0.58   --   0.60   CA  7.7*   --   8.8   MG   --   2.5*   --    ALB   --    --   3.8   TBILI   --    --   0.6   SGOT   --    --   45*   ALT   --    --   49       Signed: Jose David Garner MD

## 2018-01-31 NOTE — PROGRESS NOTES
Oncology Interdisciplinary rounds were held today to discuss patient plan of care and outcomes. The following members were present: Nursing, Case Management, Pharmacy and Dietary.     Actual Length of Stay: 2    DRG GLOS: 3    Expected Length of Stay: 3d 0h                Plan for Day        Mobility        Plan for Stay      Plan for Way   Regular diet- monitor nausea  Patient stated goal- reduce nausea Up ad tamra  Walk in room/up in chair for meals IV antibiotics  IV fluids  Pain control  Waiting on urine cultures  Labwork in AM Discharge home 2/2

## 2018-02-01 VITALS
TEMPERATURE: 97.6 F | DIASTOLIC BLOOD PRESSURE: 70 MMHG | BODY MASS INDEX: 29.77 KG/M2 | HEART RATE: 111 BPM | OXYGEN SATURATION: 95 % | RESPIRATION RATE: 19 BRPM | WEIGHT: 174.38 LBS | SYSTOLIC BLOOD PRESSURE: 103 MMHG | HEIGHT: 64 IN

## 2018-02-01 LAB
FERRITIN SERPL-MCNC: 262 NG/ML (ref 8–252)
GLUCOSE BLD STRIP.AUTO-MCNC: 55 MG/DL (ref 65–100)
GLUCOSE BLD STRIP.AUTO-MCNC: 94 MG/DL (ref 65–100)
IRON SATN MFR SERPL: 62 % (ref 20–50)
IRON SERPL-MCNC: 127 UG/DL (ref 35–150)
SERVICE CMNT-IMP: ABNORMAL
SERVICE CMNT-IMP: NORMAL
TIBC SERPL-MCNC: 206 UG/DL (ref 250–450)

## 2018-02-01 PROCEDURE — 83540 ASSAY OF IRON: CPT | Performed by: INTERNAL MEDICINE

## 2018-02-01 PROCEDURE — 74011250636 HC RX REV CODE- 250/636: Performed by: INTERNAL MEDICINE

## 2018-02-01 PROCEDURE — 82525 ASSAY OF COPPER: CPT | Performed by: INTERNAL MEDICINE

## 2018-02-01 PROCEDURE — 82962 GLUCOSE BLOOD TEST: CPT

## 2018-02-01 PROCEDURE — 74011250637 HC RX REV CODE- 250/637: Performed by: INTERNAL MEDICINE

## 2018-02-01 PROCEDURE — 36415 COLL VENOUS BLD VENIPUNCTURE: CPT | Performed by: INTERNAL MEDICINE

## 2018-02-01 PROCEDURE — 82728 ASSAY OF FERRITIN: CPT | Performed by: INTERNAL MEDICINE

## 2018-02-01 RX ORDER — METRONIDAZOLE 500 MG/1
500 TABLET ORAL
Qty: 20 TAB | Refills: 0 | Status: SHIPPED | OUTPATIENT
Start: 2018-02-01 | End: 2019-03-20

## 2018-02-01 RX ORDER — BUTALBITAL, ACETAMINOPHEN AND CAFFEINE 50; 325; 40 MG/1; MG/1; MG/1
1 TABLET ORAL
Qty: 20 TAB | Refills: 0 | Status: SHIPPED | OUTPATIENT
Start: 2018-02-01 | End: 2018-04-12

## 2018-02-01 RX ORDER — CEPHALEXIN 500 MG/1
500 CAPSULE ORAL 4 TIMES DAILY
Qty: 40 CAP | Refills: 0 | Status: SHIPPED | OUTPATIENT
Start: 2018-02-01 | End: 2018-02-11

## 2018-02-01 RX ORDER — ONDANSETRON 4 MG/1
4 TABLET, ORALLY DISINTEGRATING ORAL
Qty: 10 TAB | Refills: 0 | Status: SHIPPED | OUTPATIENT
Start: 2018-02-01 | End: 2018-04-12

## 2018-02-01 RX ORDER — OXYCODONE HYDROCHLORIDE 5 MG/1
5 TABLET ORAL
Qty: 20 TAB | Refills: 0 | Status: SHIPPED | OUTPATIENT
Start: 2018-02-01 | End: 2018-04-12

## 2018-02-01 RX ADMIN — LORAZEPAM 0.5 MG: 0.5 TABLET ORAL at 08:13

## 2018-02-01 RX ADMIN — METRONIDAZOLE 500 MG: 250 TABLET ORAL at 01:30

## 2018-02-01 RX ADMIN — LORAZEPAM 0.5 MG: 0.5 TABLET ORAL at 14:49

## 2018-02-01 RX ADMIN — Medication 10 ML: at 14:04

## 2018-02-01 RX ADMIN — NEBIVOLOL HYDROCHLORIDE 5 MG: 2.5 TABLET ORAL at 14:02

## 2018-02-01 RX ADMIN — ONDANSETRON HYDROCHLORIDE 4 MG: 2 INJECTION, SOLUTION INTRAMUSCULAR; INTRAVENOUS at 15:01

## 2018-02-01 RX ADMIN — CEPHALEXIN 500 MG: 250 CAPSULE ORAL at 08:13

## 2018-02-01 RX ADMIN — CEPHALEXIN 500 MG: 250 CAPSULE ORAL at 14:02

## 2018-02-01 RX ADMIN — FLUOXETINE 40 MG: 20 CAPSULE ORAL at 08:13

## 2018-02-01 RX ADMIN — KETOROLAC TROMETHAMINE 15 MG: 30 INJECTION, SOLUTION INTRAMUSCULAR at 08:13

## 2018-02-01 RX ADMIN — LEVOTHYROXINE SODIUM 50 MCG: 50 TABLET ORAL at 08:13

## 2018-02-01 RX ADMIN — Medication 1 CAPSULE: at 08:13

## 2018-02-01 RX ADMIN — KETOROLAC TROMETHAMINE 15 MG: 30 INJECTION, SOLUTION INTRAMUSCULAR at 01:33

## 2018-02-01 RX ADMIN — BUTALBITAL, ACETAMINOPHEN AND CAFFEINE 1 TABLET: 50; 325; 40 TABLET ORAL at 01:31

## 2018-02-01 RX ADMIN — Medication 10 ML: at 01:31

## 2018-02-01 RX ADMIN — BUTALBITAL, ACETAMINOPHEN AND CAFFEINE 1 TABLET: 50; 325; 40 TABLET ORAL at 10:32

## 2018-02-01 NOTE — PROGRESS NOTES
Bedside and Verbal shift change report given to Robel Hwang (oncoming nurse) by SULMA Murdock RN (offgoing nurse). Report given with SBAR, Kardex, Intake/Output, MAR and Recent Results.

## 2018-02-01 NOTE — PROGRESS NOTES
PT CALLED OUT STATING, \"I FEEL LIKE MY SUGAR IS DROPPING. \"  POC READ 55. GAVE OJ, PB AND JOSH CRACKERS. 15 MINS PAST POC 94. WILL CONT TO MONITOR.

## 2018-02-01 NOTE — PROGRESS NOTES
I have reviewed discharge instructions with the patient. The patient verbalized understanding. Discharge medications reviewed with patient and appropriate educational materials and side effects teaching were provided. Home or Self Care. Returned home meds. Pt accompanied by spouse.

## 2018-02-01 NOTE — DISCHARGE INSTRUCTIONS
HOSPITALIST DISCHARGE INSTRUCTIONS    NAME: Glynn Carpio   :  1970   MRN:  [id number]     Date/Time:  2018 8:38 AM    ADMIT DATE: 2018   DISCHARGE DATE: 2018     Attending Physician: Jessica Hood MD    DISCHARGE DIAGNOSIS:  Pyelonephritis in setting of bladder prolapse  Depression with anxiety  Headache  Hypothyroidism  Supraventricular tachycardia history  B12 deficiency    MEDICATIONS:  See above    · It is important that you take the medication exactly as they are prescribed. · Keep your medication in the bottles provided by the pharmacist and keep a list of the medication names, dosages, and times to be taken in your wallet. · Do not take other medications without consulting your doctor. Pain Management: per above medications    What to do at [address]:  Resume previous diet    Recommended activity: Activity as tolerated    If you have questions regarding the hospital related prescriptions or hospital related issues please call [facility] at . You can always direct your questions to your primary care doctor if you are unable to reach your hospital physician; your PCP works as an extension of your hospital doctor just like your hospital doctor is an extension of your PCP for your time at [address] If you experience any of the following symptoms then please call your primary care physician or return to the emergency room if you cannot get hold of your doctor:  Fever, chills, nausea, vomiting, diarrhea, change in mentation, falling, bleeding, shortness of breath    Additional Instructions:    I recommend that you take an over-the-counter probiotic (such as Align, Culturelle, or store generic) while you are on antibiotics. Bring these papers with you to your follow up appointments. The papers will help your doctors be sure to continue the care plan from the hospital.              Information obtained by :   I understand that if any problems occur once I am at home I am to contact my physician. I understand and acknowledge receipt of the instructions indicated above. [de-identified] or R.N.'s Signature                                                                  Date/Time                                                                                                                                              Patient or Representative Signature                                                          Date/Time       Kidney Infection: Care Instructions  Your Care Instructions    A kidney infection (pyelonephritis) is a type of urinary tract infection, or UTI. Most UTIs are bladder infections. Kidney infections tend to make people much sicker than bladder infections do. A kidney infection is also more serious because it can cause lasting damage if it is not treated quickly. Follow-up care is a key part of your treatment and safety. Be sure to make and go to all appointments, and call your doctor if you are having problems. It's also a good idea to know your test results and keep a list of the medicines you take. How can you care for yourself at home? · Take your antibiotics as directed. Do not stop taking them just because you feel better. You need to take the full course of antibiotics. · Drink plenty of water, enough so that your urine is light yellow or clear like water. This may help wash out bacteria that are causing the infection. If you have kidney, heart, or liver disease and have to limit fluids, talk with your doctor before you increase the amount of fluids you drink. · Urinate often. Try to empty your bladder each time. · To relieve pain, take a hot shower or lay a heating pad (set on low) over your lower belly. Never go to sleep with a heating pad in place.  Put a thin cloth between the heating pad and your skin. To help prevent kidney infections  · Drink plenty of water each day. This helps you urinate often, which clears bacteria from your system. If you have kidney, heart, or liver disease and have to limit fluids, talk with your doctor before you increase the amount of fluids you drink. · Urinate when you have the urge. Do not hold your urine for a long time. Urinate before you go to sleep. · If you have symptoms of a bladder infection, such as burning when you urinate or having to urinate often, call your doctor so you can treat the problem before it gets worse. If you do not treat a bladder infection quickly, it can spread to the kidney. · Men should keep the tip of the penis clean. If you are a woman, keep these ideas in mind:  · Urinate right after you have sex. · Change sanitary pads often. Avoid douches, feminine hygiene sprays, and other feminine hygiene products that have deodorants. · After going to the bathroom, wipe from front to back. When should you call for help? Call your doctor now or seek immediate medical care if:  ? · You have symptoms that a kidney infection is getting worse. These may include:  ¨ Pain or burning when you urinate. ¨ A frequent need to urinate without being able to pass much urine. ¨ Pain in the flank, which is just below the rib cage and above the waist on either side of the back. ¨ Blood in the urine. ¨ A fever. ? · You are vomiting or nauseated. ? Watch closely for changes in your health, and be sure to contact your doctor if:  ? · You do not get better as expected. Where can you learn more? Go to http://praful-monserrat.info/. Enter Y084 in the search box to learn more about \"Kidney Infection: Care Instructions. \"  Current as of: May 12, 2017  Content Version: 11.4  © 5485-1915 [employer].  Care instructions adapted under license by [employer] (which disclaims liability or warranty for this information). If you have questions about a medical condition or this instruction, always ask your healthcare professional. [place] [address] any warranty or liability for your use of this information.

## 2018-02-01 NOTE — PROGRESS NOTES
Bedside shift change report given to [address] and Otilia RN (oncoming nurse) by Aydin Nguyen RN (offgoing nurse). Report included the following information SBAR, Kardex, MAR and Recent Results.

## 2018-02-01 NOTE — PROGRESS NOTES
Oncology Nursing Communication Tool  7:13 PM  1/31/2018     Bedside shift change report given to Denzel Hudson RN (incoming nurse) by Jil Spears RN (outgoing nurse) on Clare Zhang. Report included the following information SBAR. Shift Summary:       Issues for physician to address: Oncology Shift Note   Admission Date 1/29/2018   Admission Diagnosis Pyelonephritis   Code Status Full Code   Consults IP CONSULT TO UROLOGY  IP CONSULT TO PSYCHIATRY      Cardiac Monitoring [] Yes [] No      Purposeful Hourly Rounding [x] Yes    Jocelyn Score Total Score: 1   Jocelyn score 3 or > [] Bed Alarm [] Avasys [] 1:1 sitter [] Patient refused (Place signed refusal form in chart)      Pain Managed [x] Yes [] No    Key Pain Meds             ASPIRIN/SALICYLAMIDE/CAFFEINE (BC HEADACHE POWDER PO)  (Taking) Take 1 Packet by mouth daily as needed (headache). Influenza Vaccine Received Flu Vaccine for Current Season (usually Sept-March): No    Patient/Guardian Refused (Notify MD): Yes      Oxygen needs? [] Room air Oxygen @  []1L    []2L    []3L   []4L    []5L   []6L     Use home O2? [] Yes [] No  Perform O2 challenge test using  smartphrase (.oxygenchallenge)      Last bowel movement Last Bowel Movement Date: 01/30/18  bowel movement      Urinary Catheter             LDAs               Peripheral IV 01/31/18 Right Wrist (Active)   Site Assessment Clean, dry, & intact 1/31/2018  8:00 AM   Phlebitis Assessment 0 1/31/2018  8:00 AM   Infiltration Assessment 0 1/31/2018  8:00 AM   Dressing Status Clean, dry, & intact 1/31/2018  8:00 AM   Dressing Type Tape;Transparent 1/31/2018  8:00 AM   Hub Color/Line Status Yellow; Flushed;Patent; Infusing 1/31/2018  7:15 AM   Action Taken Dressing reinforced 1/31/2018  7:15 AM   Alcohol Cap Used Yes 1/31/2018  8:00 AM                         Readmission Risk Assessment Tool Score Low Risk            7       Total Score        3 Has Seen PCP in Last 6 Months (Yes=3, No=0)    4 IP Visits Last 12 Months (1-3=4, 4=9, >4=11)        Criteria that do not apply:    . Living with Significant Other. Assisted Living. LTAC. SNF. or   Rehab    Patient Length of Stay (>5 days = 3)    Pt.  Coverage (Medicare=5 , Medicaid, or Self-Pay=4)    Charlson Comorbidity Score (Age + Comorbid Conditions)       Expected Length of Stay 3d 0h   Actual Length of Stay 2          Johana Sandy RN

## 2018-02-01 NOTE — PROGRESS NOTES
Patient discharged to home. She states her  or she will arrange discharge follow-up with counselor and PCP. Patient states she will \"have to do what my  says because he provides transportation\". Patient requested second copy of ETOH and psych counseling resources. Second copy printed and provided to patient. Care Management Interventions  PCP Verified by CM: Yes (Dr. Cassandra Tuttle - [phone])  Mode of Transport at Discharge: Other (see comment) ( to transport patient after work - she states there is no one to   her up sooner)  Transition of Care Consult (CM Consult):  Other (Discharge note)  MyChart Signup: No  Discharge Durable Medical Equipment: No  Physical Therapy Consult: No  Occupational Therapy Consult: No  Speech Therapy Consult: No  Current Support Network: Lives with Spouse, Own Home  Confirm Follow Up Transport: Family  Plan discussed with Pt/Family/Caregiver: Yes  Freedom of Choice Offered: Yes (Patient provided with list of ETOH and psych resources/providers for [facility] - will make follow-up appt per her request)  Discharge Location  Discharge Placement:  (Home)    Donald Cheung RN, BSN, [address] [place] [address] Oncology  [phone]

## 2018-02-01 NOTE — DISCHARGE SUMMARY
Hospitalist Discharge Summary     Patient ID:  Shelby Adkins  [id number]  59 y.o.  1970    PCP on record: Taran Cloud MD    Admit date: 1/29/2018  Discharge date and time: 2/1/2018      DISCHARGE DIAGNOSIS:  Pyelonephritis in setting of bladder prolapse  Depression with anxiety with alcohol abuse  Headache  Hypothyroidism  Sarcoidosis, connective tissue d/o NOS  SVT  B12 deficiency      CONSULTATIONS:  IP CONSULT TO UROLOGY  IP CONSULT TO PSYCHIATRY    Excerpted HPI from H&P of Shirley Harris MD:  Patient is a 80-year-old female with a history of recurrent UTIs in the past.  As per the patient, the UTIs have been attributed to her bladder prolapse, for which she had a sling procedure done, which was not effective as per the patient. About 2 days back, she had started noticing was having burning of urination and also the urine color was cloudy. Started noticing bilateral flank pains with significant nausea, was unable to eat anything in the last 24 hours because of the nausea. Did have one episode of emesis. No complaints of any chest pain or chest pressure. Denied any complaints of any cough.    ______________________________________________________________________  DISCHARGE SUMMARY/HOSPITAL COURSE:  for full details see H&P, daily progress notes, labs, consult notes. Hospital course:  Pyelonephritis in setting of bladder prolapse:  H/o ESBL E Coli 11/17.  Sees Dr. Michelle Jacobsen with [facility] for her bladder prolapse and has had sling procedure in the past.  Pt did not have blood cultures sent on admission.  Urine culture >002408 pansensitive E Coli. She was initially treated with emperic meropenem based on prior cutlures, then changed to keflex to complete a 14 day Abx course for pyelonephritis. Depression with anxiety:  Admits to dealing with anxiety recently with EtOH-abuse, no e/o withdrawal while here. Pt should con't home prozac with prn trazodone.   She was seen in consultation by psychiatry, CM saw her per their recommendations and gave her outpatient resources. Headache: con't fioricet  Hypothyroidism: con't home synthroid   Sarcoidosis, connective tissue d/o NOS  SVT:  Sees Fernando. She should con't prn bystolic. B12 deficiency      _______________________________________________________________________  Patient seen and examined by me on discharge day. Pertinent Findings:  Gen: awake, appropriate, NAD  HEENT: cl harriet, no lesions  Chest: CTA bilaterally, no crackles or wheezes  Cv: RRR, no murmur, no edema  Abd: soft, NT, ND, BS+, no mass  Neuro: CN intact  _______________________________________________________________________  DISCHARGE MEDICATIONS:   Current Discharge Medication List      START taking these medications    Details   butalbital-acetaminophen-caffeine (FIORICET, ESGIC) -40 mg per tablet Take 1 Tab by mouth every six (6) hours as needed for Headache. Qty: 20 Tab, Refills: 0      cephALEXin (KEFLEX) 500 mg capsule Take 1 Cap by mouth four (4) times daily for 10 days. Qty: 40 Cap, Refills: 0      metroNIDAZOLE (FLAGYL) 500 mg tablet Take 1 Tab by mouth three (3) times daily as needed for Diarrhea or Other. Qty: 20 Tab, Refills: 0      oxyCODONE IR (ROXICODONE) 5 mg immediate release tablet Take 1 Tab by mouth every six (6) hours as needed for Pain. Max Daily Amount: 20 mg.  Qty: 20 Tab, Refills: 0    Associated Diagnoses: Acute cystitis without hematuria      ondansetron (ZOFRAN ODT) 4 mg disintegrating tablet Take 1 Tab by mouth every eight (8) hours as needed for Nausea. Qty: 10 Tab, Refills: 0         CONTINUE these medications which have NOT CHANGED    Details   cyanocobalamin (VITAMIN B12) 1,000 mcg/mL injection 1,000 mcg by IntraMUSCular route every twenty-eight (28) days. traZODone (DESYREL) 100 mg tablet Take 100 mg by mouth nightly as needed for Sleep. FLUoxetine (PROZAC) 40 mg capsule Take 40 mg by mouth daily. ASPIRIN/SALICYLAMIDE/CAFFEINE (BC HEADACHE POWDER PO) Take 1 Packet by mouth daily as needed (headache). nebivolol (BYSTOLIC) 5 mg tablet Take 5 mg by mouth daily as needed ('rapid heart beat'). levothyroxine (SYNTHROID) 50 mcg tablet Take 50 mcg by mouth Daily (before breakfast). ergocalciferol (VITAMIN D2) 50,000 unit capsule Take 50,000 Units by mouth two (2) times a week. 'On Tuesdays and Thursdays or Fridays.'             My Recommended Diet, Activity, Wound Care, and follow-up labs are listed in the patient's Discharge Insturctions which I have personally completed and reviewed.     ______________________________________________________________________    Risk of deterioration: Moderate    Condition at Discharge:  Stable  ______________________________________________________________________    Disposition  Home with family, no needs  ______________________________________________________________________    Care Plan discussed with:   Patient, RN, Care Manager    ______________________________________________________________________    Code Status: Full Code  ______________________________________________________________________      Follow up with:   PCP : Violette Bobo MD  Follow-up Information     Follow up With Details [address], MD In 2 weeks routine hospital follow up with repeat urine analysis [id number]  [address] [place] [address]      Stephanie Shah MD In 2 weeks follow up for your bladder prolapse [address] [place], [place] [address] [facility]  [phone]                Total time in minutes spent coordinating this discharge (includes going over instructions, follow-up, prescriptions, and preparing report for sign off to her PCP) :  35 minutes    Signed:  Clint Acevedo MD

## 2018-02-01 NOTE — PROGRESS NOTES
Pharmacist Discharge Medication Reconciliation    Significant PMH:   Past Medical History:   Diagnosis Date    Anemia     Anxiety     Arthritis     osteoarthritis    Autoimmune disease ([address]     Bowel trouble     Burning with urination     Chronic pain     Connective tissue disorder (HCC)     Depression     Dyspepsia and other specified disorders of function of stomach     Headache     Hypothyroid     Osteoporosis     Sarcoidosis      Chief Complaint for this Admission:   Chief Complaint   Patient presents with    Urinary Frequency     with burning and urgency x 2 days    Chills    Nausea     Allergies: Review of patient's allergies indicates no known allergies. Discharge Medications:   Current Discharge Medication List        START taking these medications    Details   butalbital-acetaminophen-caffeine (FIORICET, ESGIC) -40 mg per tablet Take 1 Tab by mouth every six (6) hours as needed for Headache. Qty: 20 Tab, Refills: 0      cephALEXin (KEFLEX) 500 mg capsule Take 1 Cap by mouth four (4) times daily for 10 days. Qty: 40 Cap, Refills: 0      metroNIDAZOLE (FLAGYL) 500 mg tablet Take 1 Tab by mouth three (3) times daily as needed for Diarrhea or Other. Qty: 20 Tab, Refills: 0      oxyCODONE IR (ROXICODONE) 5 mg immediate release tablet Take 1 Tab by mouth every six (6) hours as needed for Pain. Max Daily Amount: 20 mg.  Qty: 20 Tab, Refills: 0    Associated Diagnoses: Acute cystitis without hematuria      ondansetron (ZOFRAN ODT) 4 mg disintegrating tablet Take 1 Tab by mouth every eight (8) hours as needed for Nausea. Qty: 10 Tab, Refills: 0           CONTINUE these medications which have NOT CHANGED    Details   cyanocobalamin (VITAMIN B12) 1,000 mcg/mL injection 1,000 mcg by IntraMUSCular route every twenty-eight (28) days. traZODone (DESYREL) 100 mg tablet Take 100 mg by mouth nightly as needed for Sleep. FLUoxetine (PROZAC) 40 mg capsule Take 40 mg by mouth daily. ASPIRIN/SALICYLAMIDE/CAFFEINE (BC HEADACHE POWDER PO) Take 1 Packet by mouth daily as needed (headache). nebivolol (BYSTOLIC) 5 mg tablet Take 5 mg by mouth daily as needed ('rapid heart beat'). levothyroxine (SYNTHROID) 50 mcg tablet Take 50 mcg by mouth Daily (before breakfast). ergocalciferol (VITAMIN D2) 50,000 unit capsule Take 50,000 Units by mouth two (2) times a week. 'On Tuesdays and Thursdays or Fridays.'             The patient's chart, MAR and AVS were reviewed by Chase Chaves [facility]. Discharging Provider: RAFAEL Sandra      Thank Salinas Tovar, [facility]

## 2018-02-06 LAB — COPPER SERPL-MCNC: 55 UG/DL (ref 72–166)

## 2018-04-12 ENCOUNTER — ANESTHESIA EVENT (OUTPATIENT)
Dept: SURGERY | Age: 48
End: 2018-04-12
Payer: COMMERCIAL

## 2018-04-12 RX ORDER — SULFAMETHOXAZOLE AND TRIMETHOPRIM 400; 80 MG/1; MG/1
1 TABLET ORAL DAILY
COMMUNITY
End: 2019-03-20

## 2018-04-12 NOTE — PERIOP NOTES
PAT PHONE INTERVIEW COMPLETED WITH PT.  PT WAS GIVEN INFECTION PREVENTION INFORMATION VERBALLY; PT VOICED UNDERSTANDING. PT WAS GIVEN THE OPPORTUNITY TO ASK ADDITIONAL QUESTIONS.     PT STATES SHE HAD ALL PRE OP LABS AND TESTS DONE WITH PCP

## 2018-04-12 NOTE — H&P
Chris Anglin  Location: [facility]  Patient #: [id number]  : 1970   / Language: English / Race: White  Female      History of Present Illness   The patient is a 52year old female who presents with neck pain. This condition occurred without any known injury. Symptoms include neck pain, impaired range of motion and shoulder pain. Symptoms are located in the entire neck and right lateral neck. The pain radiates to the right shoulder, right arm, right upper arm, right forearm and right hand. The patient describes the pain as sharp, aching, burning and stinging. Onset was gradual 9 month(s) ago. The symptoms occur constantly. The patient describes symptoms as severe and worsening. Symptoms are exacerbated by turning the head to the right, use of the right arm, neck flexion, neck extension and neck movement. Associated symptoms include headache and upper extremity weakness (right arm). Current treatment includes nonsteroidal anti-inflammatory drugs, muscle relaxants and opioid analgesics. The patient was previously evaluated by a primary care provider. Past evaluation has included cervical spine x-rays. Past treatment has included nonsteroidal anti-inflammatory drugs, muscle relaxants and opioid analgesics. Allergies   No Known Drug Allergies  [03/15/2018]:    Family History   Anemia   Mother. Arthritis   Father. Bladder problems   Mother. Cerebrovascular Accident   Father. Depression   Mother. Diabetes Mellitus   Father. Heart disease in female family member before age 72    Hypercholesterolemia   Father. Hypertension   Father. Kidney disease   Father. Severe allergy   Mother. Social History   Caffeine use   Carbonated beverages. Current work status   Unemployed, looking for work, Unemployed, not looking for work. Exercise   1-2 times per week, Other, walking. Marital status   . Seat Belt Use   Always uses seat belts. Sun Exposure   Occasionally.   Tobacco / smoke exposure   None. Tobacco use   Never smoker. Medication History   OxyCODONE HCl  (5MG Tablet, Oral) Active. Butalbital-APAP-Caffeine  (-40MG Tablet, Oral) Active. Cephalexin  (500MG Capsule, Oral) Active. FLUoxetine HCl  (40MG Capsule, Oral) Active. Furosemide  (20MG Tablet, Oral) Active. HydrOXYzine HCl  (25MG Tablet, Oral) Active. Nitrofurantoin Macrocrystal  (50MG Capsule, Oral) Active. MetroNIDAZOLE  (500MG Tablet, Oral) Active. Sulfamethoxazole-Trimethoprim  (400-80MG Tablet, Oral) Active. TraZODone HCl  (100MG Tablet, Oral) Active. Ondansetron  (4MG Tablet Disint, Oral) Active. Bystolic  (5MG Tablet, Oral) Active. Cyanocobalamin  (1000MCG/ML Solution, Injection) Active. Gabapentin  (400MG Capsule, Oral) Active. Medications Reconciled     Past Surgical History  Abdominal Hernia Surgery     Delivery   2 times  Foot Surgery   left  Inguinal Hernia Repair   laparoscopic: right  Other Eye Surgery   bilateral  Spinal Decompression   lower back  Spinal Surgery    Weight Loss Surgery   Bypass    Other Problems  Alcohol Abuse    Unspecified Diagnosis    Arthritis    Autoimmune disorder    Bladder Problems    Depression    Migraine Headache      Review of Systems  General Present- Appetite Loss, Fatigue and Weight Gain. Not Present- Chills, Fever, Night Sweats and Weight Loss. HEENT Present- Ringing in the Ears. Not Present- Decreased Hearing, Earache, Hoarseness, Loose Teeth, Nose Bleed and Sore Throat. Cardiovascular Present- Difficulty Breathing On Exertion. Not Present- Bluish Discoloration Of Lips Or Nails, Chest Pain, Difficulty Breathing Lying Down, Leg Cramps With Exertion, Palpitations and Swelling of Extremities. Musculoskeletal Present- Back Pain and Joint Stiffness. Not Present- Joint Pain and Joint Swelling. Neurological Present- Headaches, Numbness, Tingling, Unsteadiness and Weakness. Not Present- Fainting, Memory Loss, Seizures and Tremor.   Psychiatric Present- Anxiety. Not Present- Bipolar and Depression. Endocrine Present- Excessive Thirst and Excessive Urination. Not Present- Cold Intolerance, Excessive Hunger and Heat Intolerance. Physical Exam  Neurologic  Sensory  Light Touch - Decreased - Right C6. Overall Assessment of Muscle Strength and Tone reveals  Upper Extremities - Right Deltoid - 5/5. Left Deltoid - 5/5. Right Bicep - 3+/5. Left Bicep - 5/5. Right Tricep - 5/5. Left Tricep - 5/5. Right Wrist Extensors - 5/5. Left Wrist Extensors - 5/5. Right Wrist Flexors - 3+/5. Left Wrist Flexors - 5/5. Right Intrinsics - 5/5. Left Intrinsics - 5/5. General Assessment of Reflexes  Right Hand - Sandy's sign is negative in the right hand. Left Hand - Sandy's sign is negative in the left hand. Reflexes (Dermatomes)  2/2 Normal - Left Bicep (C5-6), Left Tricep (C7-8), Left Brachioradialis (C5-6), Right Bicep (C5-6), Right Tricep (C7-8) and Right Brachioradialis (C5-6). Musculoskeletal  Global Assessment  Examination of related systems reveals - well-developed, well-nourished, in no acute distress, alert and oriented x 3 and normal coordination. Gait and Station - normal gait and station and normal posture. Spine/Ribs/Pelvis  Cervical Spine - Evaluation of related systems reveals - no lymphadenopathy and neurovascularly intact bilaterally. Inspection and Palpation - Tenderness - moderate and localized. Assessment of pain reveals the following findings: - Location - cervical area, upper trapezius area, (R) and mid scapular area, (R). ROJM - Flexion - 85 °. Right Lateral Flexion - 35 °. Left Lateral Flexion - 35 °. Extension - 70 °. Right Rotation - 80 °. Left Rotation - . Cervical Spine - Functional Testing - Foraminal Compression/Spurling's Test positive and Shoulder Depression Test positive, Upper Limb Tension Test negative.  Lumbosacral Spine - Examination of the lumbosacral spine reveals - no tenderness to palpation, no pain, normal strength and tone, no laxity or crepitus and normal lumbosacral spine movements. Assessment & Plan   Cervical radiculitis (723.4  M54.12)  Impression: She has had severe neck pain and right arm pain. She has weakness in the wrist extensor. She has moderate spondylosis at C5-6 which has been chronic. It was persistently since her accident last year. Apparently she candidate because of the sensory motor deficit for an ACDF at C5-6. The risks and benefits were discussed at length with the patient and the patient has elected to proceed. Indications for surgery include failed conservative treatment. Alternative treatments, risks and the perioperative course were discussed with the patient. All questions were answered. The risks and benefits of the procedure were explained. Benefits include definitive diagnosis, relief of pain, elimination of deformity and improved function. Risks of surgery including bleeding, infection, weakness, numbness, CSF leak, failure to improve symptoms, exacerbation of medical co-morbidities and even death were discussed with the patient. Current Plans  X-RAY EXAM OF CERVICAL SPINE MIN 4 VIEWS (61193) (Severe spondylosis C5-6)  Started Mobic 7.5MG, 1 (one) Tablet two times daily, as needed, #60, 03/15/2018, Ref. x1.  Started Neurontin 300MG, 1 (one) Capsule at bedtime, #30, 03/15/2018, Ref. x1.  Chronic neck pain (723.1  M54.2)  Treatment options were discussed with the patient in full.(V65.49)  Current Plans  Presurgical planning was preformed with the patient today  Surgery to be scheduled  Pt Education - How to access health information online: discussed with patient and provided information. Signed by Rajni Prado MD    Date of Surgery Update:  Chris King was seen and examined. History and physical has been reviewed. The patient has been examined.  There have been no significant clinical changes since the completion of the originally dated History and Physical.    Signed By: Henry Arguello Pedro Anthony MD     April 13, 2018 7:16 AM

## 2018-04-13 ENCOUNTER — HOSPITAL ENCOUNTER (OUTPATIENT)
Age: 48
Setting detail: OUTPATIENT SURGERY
Discharge: HOME | End: 2018-04-13
Attending: ORTHOPAEDIC SURGERY | Admitting: ORTHOPAEDIC SURGERY
Payer: COMMERCIAL

## 2018-04-13 ENCOUNTER — ANESTHESIA (OUTPATIENT)
Dept: SURGERY | Age: 48
End: 2018-04-13
Payer: COMMERCIAL

## 2018-04-13 ENCOUNTER — APPOINTMENT (OUTPATIENT)
Dept: GENERAL RADIOLOGY | Age: 48
End: 2018-04-13
Attending: ORTHOPAEDIC SURGERY
Payer: COMMERCIAL

## 2018-04-13 VITALS
OXYGEN SATURATION: 92 % | SYSTOLIC BLOOD PRESSURE: 129 MMHG | TEMPERATURE: 97.8 F | HEART RATE: 116 BPM | WEIGHT: 182 LBS | DIASTOLIC BLOOD PRESSURE: 71 MMHG | RESPIRATION RATE: 17 BRPM | BODY MASS INDEX: 30.32 KG/M2 | HEIGHT: 65 IN

## 2018-04-13 DIAGNOSIS — M48.02 CERVICAL STENOSIS OF SPINE: Primary | ICD-10-CM

## 2018-04-13 LAB
ABO + RH BLD: NORMAL
BLOOD GROUP ANTIBODIES SERPL: NORMAL
GLUCOSE BLD STRIP.AUTO-MCNC: 85 MG/DL (ref 65–100)
SERVICE CMNT-IMP: NORMAL
SPECIMEN EXP DATE BLD: NORMAL

## 2018-04-13 PROCEDURE — 74011250637 HC RX REV CODE- 250/637

## 2018-04-13 PROCEDURE — 77030013079 HC BLNKT BAIR HGGR 3M -A: Performed by: ANESTHESIOLOGY

## 2018-04-13 PROCEDURE — 76010000162 HC OR TIME 1.5 TO 2 HR INTENSV-TIER 1: Performed by: ORTHOPAEDIC SURGERY

## 2018-04-13 PROCEDURE — 74011250636 HC RX REV CODE- 250/636

## 2018-04-13 PROCEDURE — V2790 AMNIOTIC MEMBRANE: HCPCS | Performed by: ORTHOPAEDIC SURGERY

## 2018-04-13 PROCEDURE — 77030038600 HC TU BPLR IRR DISP STRY -B: Performed by: ORTHOPAEDIC SURGERY

## 2018-04-13 PROCEDURE — 77030012406 HC DRN WND PENRS BARD -A: Performed by: ORTHOPAEDIC SURGERY

## 2018-04-13 PROCEDURE — 77030019908 HC STETH ESOPH SIMS -A: Performed by: ANESTHESIOLOGY

## 2018-04-13 PROCEDURE — 36415 COLL VENOUS BLD VENIPUNCTURE: CPT | Performed by: ORTHOPAEDIC SURGERY

## 2018-04-13 PROCEDURE — 72020 X-RAY EXAM OF SPINE 1 VIEW: CPT

## 2018-04-13 PROCEDURE — 77030032490 HC SLV COMPR SCD KNE COVD -B: Performed by: ORTHOPAEDIC SURGERY

## 2018-04-13 PROCEDURE — 77030037713 HC CLOSR DEV INCIS ZIP STRY -B: Performed by: ORTHOPAEDIC SURGERY

## 2018-04-13 PROCEDURE — 77030026438 HC STYL ET INTUB CARD -A: Performed by: ANESTHESIOLOGY

## 2018-04-13 PROCEDURE — 77030031139 HC SUT VCRL2 J&J -A: Performed by: ORTHOPAEDIC SURGERY

## 2018-04-13 PROCEDURE — 77030020268 HC MISC GENERAL SUPPLY: Performed by: ORTHOPAEDIC SURGERY

## 2018-04-13 PROCEDURE — 76060000034 HC ANESTHESIA 1.5 TO 2 HR: Performed by: ORTHOPAEDIC SURGERY

## 2018-04-13 PROCEDURE — 74011250636 HC RX REV CODE- 250/636: Performed by: PHYSICIAN ASSISTANT

## 2018-04-13 PROCEDURE — 77030008684 HC TU ET CUF COVD -B: Performed by: ANESTHESIOLOGY

## 2018-04-13 PROCEDURE — 77030003666 HC NDL SPINAL BD -A: Performed by: ORTHOPAEDIC SURGERY

## 2018-04-13 PROCEDURE — 82962 GLUCOSE BLOOD TEST: CPT

## 2018-04-13 PROCEDURE — 77030004391 HC BUR FLUT MEDT -C: Performed by: ORTHOPAEDIC SURGERY

## 2018-04-13 PROCEDURE — 77030018836 HC SOL IRR NACL ICUM -A: Performed by: ORTHOPAEDIC SURGERY

## 2018-04-13 PROCEDURE — 77030034475 HC MISC IMPL SPN: Performed by: ORTHOPAEDIC SURGERY

## 2018-04-13 PROCEDURE — 77030029099 HC BN WAX SSPC -A: Performed by: ORTHOPAEDIC SURGERY

## 2018-04-13 PROCEDURE — C1776 JOINT DEVICE (IMPLANTABLE): HCPCS | Performed by: ORTHOPAEDIC SURGERY

## 2018-04-13 PROCEDURE — 77030002933 HC SUT MCRYL J&J -A: Performed by: ORTHOPAEDIC SURGERY

## 2018-04-13 PROCEDURE — 74011000250 HC RX REV CODE- 250

## 2018-04-13 PROCEDURE — 74011250636 HC RX REV CODE- 250/636: Performed by: ANESTHESIOLOGY

## 2018-04-13 PROCEDURE — 76210000021 HC REC RM PH II 0.5 TO 1 HR: Performed by: ORTHOPAEDIC SURGERY

## 2018-04-13 PROCEDURE — 77030038624: Performed by: ORTHOPAEDIC SURGERY

## 2018-04-13 PROCEDURE — 76210000017 HC OR PH I REC 1.5 TO 2 HR: Performed by: ORTHOPAEDIC SURGERY

## 2018-04-13 PROCEDURE — 74011000272 HC RX REV CODE- 272: Performed by: ORTHOPAEDIC SURGERY

## 2018-04-13 PROCEDURE — 76001 XR FLUOROSCOPY OVER 60 MINUTES: CPT

## 2018-04-13 PROCEDURE — 77030013567 HC DRN WND RESERV BARD -A: Performed by: ORTHOPAEDIC SURGERY

## 2018-04-13 PROCEDURE — 74011000250 HC RX REV CODE- 250: Performed by: ORTHOPAEDIC SURGERY

## 2018-04-13 PROCEDURE — 77030011267 HC ELECTRD BLD COVD -A: Performed by: ORTHOPAEDIC SURGERY

## 2018-04-13 PROCEDURE — C1713 ANCHOR/SCREW BN/BN,TIS/BN: HCPCS | Performed by: ORTHOPAEDIC SURGERY

## 2018-04-13 PROCEDURE — 86901 BLOOD TYPING SEROLOGIC RH(D): CPT | Performed by: ORTHOPAEDIC SURGERY

## 2018-04-13 DEVICE — IMPLANTABLE DEVICE
Type: IMPLANTABLE DEVICE | Site: SPINE CERVICAL | Status: FUNCTIONAL
Brand: ASCENDANT PC

## 2018-04-13 DEVICE — GRAFT BNE SUB SM CANC FRZN MORSELIZED W/ VIABLE CELL: Type: IMPLANTABLE DEVICE | Site: SPINE CERVICAL | Status: FUNCTIONAL

## 2018-04-13 DEVICE — Z DUP USE 2602123 GRAFT HUM TISS 3CMX 4CM AMNIO MEM VERSASHIELD: Type: IMPLANTABLE DEVICE | Site: SPINE CERVICAL | Status: FUNCTIONAL

## 2018-04-13 RX ORDER — OXYCODONE HYDROCHLORIDE 5 MG/1
TABLET ORAL
Status: COMPLETED
Start: 2018-04-13 | End: 2018-04-13

## 2018-04-13 RX ORDER — SODIUM CHLORIDE 9 MG/ML
25 INJECTION, SOLUTION INTRAVENOUS CONTINUOUS
Status: DISCONTINUED | OUTPATIENT
Start: 2018-04-13 | End: 2018-04-13 | Stop reason: HOSPADM

## 2018-04-13 RX ORDER — ONDANSETRON 2 MG/ML
4 INJECTION INTRAMUSCULAR; INTRAVENOUS
Status: CANCELLED | OUTPATIENT
Start: 2018-04-13 | End: 2018-04-14

## 2018-04-13 RX ORDER — NEBIVOLOL 5 MG/1
5 TABLET ORAL
Status: CANCELLED | OUTPATIENT
Start: 2018-04-13

## 2018-04-13 RX ORDER — SODIUM CHLORIDE 0.9 % (FLUSH) 0.9 %
5-10 SYRINGE (ML) INJECTION AS NEEDED
Status: DISCONTINUED | OUTPATIENT
Start: 2018-04-13 | End: 2018-04-13 | Stop reason: HOSPADM

## 2018-04-13 RX ORDER — MORPHINE SULFATE 4 MG/ML
2 INJECTION, SOLUTION INTRAMUSCULAR; INTRAVENOUS
Status: CANCELLED | OUTPATIENT
Start: 2018-04-13

## 2018-04-13 RX ORDER — KETOROLAC TROMETHAMINE 30 MG/ML
30 INJECTION, SOLUTION INTRAMUSCULAR; INTRAVENOUS EVERY 6 HOURS
Status: CANCELLED | OUTPATIENT
Start: 2018-04-13 | End: 2018-04-14

## 2018-04-13 RX ORDER — DIPHENHYDRAMINE HYDROCHLORIDE 50 MG/ML
12.5 INJECTION, SOLUTION INTRAMUSCULAR; INTRAVENOUS AS NEEDED
Status: DISCONTINUED | OUTPATIENT
Start: 2018-04-13 | End: 2018-04-13 | Stop reason: HOSPADM

## 2018-04-13 RX ORDER — FENTANYL CITRATE 50 UG/ML
25 INJECTION, SOLUTION INTRAMUSCULAR; INTRAVENOUS
Status: COMPLETED | OUTPATIENT
Start: 2018-04-13 | End: 2018-04-13

## 2018-04-13 RX ORDER — MORPHINE SULFATE 4 MG/ML
2 INJECTION, SOLUTION INTRAMUSCULAR; INTRAVENOUS
Status: DISCONTINUED | OUTPATIENT
Start: 2018-04-13 | End: 2018-04-13 | Stop reason: HOSPADM

## 2018-04-13 RX ORDER — LIDOCAINE HYDROCHLORIDE ANHYDROUS AND DEXTROSE MONOHYDRATE .8; 5 G/100ML; G/100ML
INJECTION, SOLUTION INTRAVENOUS
Status: DISCONTINUED | OUTPATIENT
Start: 2018-04-13 | End: 2018-04-13

## 2018-04-13 RX ORDER — SODIUM CHLORIDE 9 MG/ML
125 INJECTION, SOLUTION INTRAVENOUS CONTINUOUS
Status: DISCONTINUED | OUTPATIENT
Start: 2018-04-13 | End: 2018-04-13 | Stop reason: HOSPADM

## 2018-04-13 RX ORDER — CEFAZOLIN SODIUM/WATER 2 G/20 ML
2 SYRINGE (ML) INTRAVENOUS ONCE
Status: COMPLETED | OUTPATIENT
Start: 2018-04-13 | End: 2018-04-13

## 2018-04-13 RX ORDER — ONDANSETRON 2 MG/ML
4 INJECTION INTRAMUSCULAR; INTRAVENOUS AS NEEDED
Status: DISCONTINUED | OUTPATIENT
Start: 2018-04-13 | End: 2018-04-13 | Stop reason: HOSPADM

## 2018-04-13 RX ORDER — TRAZODONE HYDROCHLORIDE 100 MG/1
100 TABLET ORAL
Status: CANCELLED | OUTPATIENT
Start: 2018-04-13

## 2018-04-13 RX ORDER — POLYETHYLENE GLYCOL 3350 17 G/17G
17 POWDER, FOR SOLUTION ORAL DAILY
Status: CANCELLED | OUTPATIENT
Start: 2018-04-13

## 2018-04-13 RX ORDER — SODIUM CHLORIDE 0.9 % (FLUSH) 0.9 %
5-10 SYRINGE (ML) INJECTION EVERY 8 HOURS
Status: DISCONTINUED | OUTPATIENT
Start: 2018-04-13 | End: 2018-04-13 | Stop reason: HOSPADM

## 2018-04-13 RX ORDER — DIPHENHYDRAMINE HYDROCHLORIDE 50 MG/ML
12.5 INJECTION, SOLUTION INTRAMUSCULAR; INTRAVENOUS
Status: CANCELLED | OUTPATIENT
Start: 2018-04-13 | End: 2018-04-14

## 2018-04-13 RX ORDER — FLUOXETINE HYDROCHLORIDE 20 MG/1
40 CAPSULE ORAL DAILY
Status: CANCELLED | OUTPATIENT
Start: 2018-04-13

## 2018-04-13 RX ORDER — ACETAMINOPHEN 325 MG/1
650 TABLET ORAL EVERY 6 HOURS
Status: CANCELLED | OUTPATIENT
Start: 2018-04-13

## 2018-04-13 RX ORDER — PROPOFOL 10 MG/ML
INJECTION, EMULSION INTRAVENOUS AS NEEDED
Status: DISCONTINUED | OUTPATIENT
Start: 2018-04-13 | End: 2018-04-13 | Stop reason: HOSPADM

## 2018-04-13 RX ORDER — CEFAZOLIN SODIUM/WATER 2 G/20 ML
2 SYRINGE (ML) INTRAVENOUS EVERY 8 HOURS
Status: CANCELLED | OUTPATIENT
Start: 2018-04-13 | End: 2018-04-13

## 2018-04-13 RX ORDER — MIDAZOLAM HYDROCHLORIDE 1 MG/ML
INJECTION, SOLUTION INTRAMUSCULAR; INTRAVENOUS AS NEEDED
Status: DISCONTINUED | OUTPATIENT
Start: 2018-04-13 | End: 2018-04-13 | Stop reason: HOSPADM

## 2018-04-13 RX ORDER — MIDAZOLAM HYDROCHLORIDE 1 MG/ML
1 INJECTION, SOLUTION INTRAMUSCULAR; INTRAVENOUS AS NEEDED
Status: DISCONTINUED | OUTPATIENT
Start: 2018-04-13 | End: 2018-04-13 | Stop reason: HOSPADM

## 2018-04-13 RX ORDER — ONDANSETRON 2 MG/ML
INJECTION INTRAMUSCULAR; INTRAVENOUS AS NEEDED
Status: DISCONTINUED | OUTPATIENT
Start: 2018-04-13 | End: 2018-04-13 | Stop reason: HOSPADM

## 2018-04-13 RX ORDER — ROPIVACAINE HYDROCHLORIDE 5 MG/ML
30 INJECTION, SOLUTION EPIDURAL; INFILTRATION; PERINEURAL ONCE
Status: DISCONTINUED | OUTPATIENT
Start: 2018-04-13 | End: 2018-04-13 | Stop reason: HOSPADM

## 2018-04-13 RX ORDER — ACETAMINOPHEN 10 MG/ML
INJECTION, SOLUTION INTRAVENOUS AS NEEDED
Status: DISCONTINUED | OUTPATIENT
Start: 2018-04-13 | End: 2018-04-13 | Stop reason: HOSPADM

## 2018-04-13 RX ORDER — FENTANYL CITRATE 50 UG/ML
INJECTION, SOLUTION INTRAMUSCULAR; INTRAVENOUS AS NEEDED
Status: DISCONTINUED | OUTPATIENT
Start: 2018-04-13 | End: 2018-04-13 | Stop reason: HOSPADM

## 2018-04-13 RX ORDER — SODIUM CHLORIDE, SODIUM LACTATE, POTASSIUM CHLORIDE, CALCIUM CHLORIDE 600; 310; 30; 20 MG/100ML; MG/100ML; MG/100ML; MG/100ML
75 INJECTION, SOLUTION INTRAVENOUS CONTINUOUS
Status: DISCONTINUED | OUTPATIENT
Start: 2018-04-13 | End: 2018-04-13 | Stop reason: HOSPADM

## 2018-04-13 RX ORDER — SODIUM CHLORIDE 0.9 % (FLUSH) 0.9 %
5-10 SYRINGE (ML) INJECTION EVERY 8 HOURS
Status: CANCELLED | OUTPATIENT
Start: 2018-04-14

## 2018-04-13 RX ORDER — LIDOCAINE HYDROCHLORIDE 20 MG/ML
INJECTION, SOLUTION EPIDURAL; INFILTRATION; INTRACAUDAL; PERINEURAL AS NEEDED
Status: DISCONTINUED | OUTPATIENT
Start: 2018-04-13 | End: 2018-04-13 | Stop reason: HOSPADM

## 2018-04-13 RX ORDER — FACIAL-BODY WIPES
10 EACH TOPICAL DAILY PRN
Status: CANCELLED | OUTPATIENT
Start: 2018-04-15

## 2018-04-13 RX ORDER — OXYCODONE HYDROCHLORIDE 5 MG/1
10 TABLET ORAL
Status: CANCELLED | OUTPATIENT
Start: 2018-04-13

## 2018-04-13 RX ORDER — FENTANYL CITRATE 50 UG/ML
50 INJECTION, SOLUTION INTRAMUSCULAR; INTRAVENOUS AS NEEDED
Status: DISCONTINUED | OUTPATIENT
Start: 2018-04-13 | End: 2018-04-13 | Stop reason: HOSPADM

## 2018-04-13 RX ORDER — MIDAZOLAM HYDROCHLORIDE 1 MG/ML
0.5 INJECTION, SOLUTION INTRAMUSCULAR; INTRAVENOUS
Status: DISCONTINUED | OUTPATIENT
Start: 2018-04-13 | End: 2018-04-13 | Stop reason: HOSPADM

## 2018-04-13 RX ORDER — NALOXONE HYDROCHLORIDE 0.4 MG/ML
0.4 INJECTION, SOLUTION INTRAMUSCULAR; INTRAVENOUS; SUBCUTANEOUS AS NEEDED
Status: CANCELLED | OUTPATIENT
Start: 2018-04-13

## 2018-04-13 RX ORDER — METRONIDAZOLE 250 MG/1
500 TABLET ORAL
Status: CANCELLED | OUTPATIENT
Start: 2018-04-13

## 2018-04-13 RX ORDER — PROPOFOL 10 MG/ML
INJECTION, EMULSION INTRAVENOUS
Status: DISCONTINUED | OUTPATIENT
Start: 2018-04-13 | End: 2018-04-13 | Stop reason: HOSPADM

## 2018-04-13 RX ORDER — ROCURONIUM BROMIDE 10 MG/ML
INJECTION, SOLUTION INTRAVENOUS AS NEEDED
Status: DISCONTINUED | OUTPATIENT
Start: 2018-04-13 | End: 2018-04-13 | Stop reason: HOSPADM

## 2018-04-13 RX ORDER — SUCCINYLCHOLINE CHLORIDE 20 MG/ML
INJECTION INTRAMUSCULAR; INTRAVENOUS AS NEEDED
Status: DISCONTINUED | OUTPATIENT
Start: 2018-04-13 | End: 2018-04-13 | Stop reason: HOSPADM

## 2018-04-13 RX ORDER — SODIUM CHLORIDE, SODIUM LACTATE, POTASSIUM CHLORIDE, CALCIUM CHLORIDE 600; 310; 30; 20 MG/100ML; MG/100ML; MG/100ML; MG/100ML
125 INJECTION, SOLUTION INTRAVENOUS CONTINUOUS
Status: DISCONTINUED | OUTPATIENT
Start: 2018-04-13 | End: 2018-04-13 | Stop reason: HOSPADM

## 2018-04-13 RX ORDER — DEXAMETHASONE SODIUM PHOSPHATE 4 MG/ML
INJECTION, SOLUTION INTRA-ARTICULAR; INTRALESIONAL; INTRAMUSCULAR; INTRAVENOUS; SOFT TISSUE AS NEEDED
Status: DISCONTINUED | OUTPATIENT
Start: 2018-04-13 | End: 2018-04-13 | Stop reason: HOSPADM

## 2018-04-13 RX ORDER — SODIUM CHLORIDE 0.9 % (FLUSH) 0.9 %
5-10 SYRINGE (ML) INJECTION AS NEEDED
Status: CANCELLED | OUTPATIENT
Start: 2018-04-13

## 2018-04-13 RX ORDER — AMOXICILLIN 250 MG
1 CAPSULE ORAL 2 TIMES DAILY
Status: CANCELLED | OUTPATIENT
Start: 2018-04-13

## 2018-04-13 RX ORDER — LIDOCAINE HYDROCHLORIDE 10 MG/ML
0.1 INJECTION, SOLUTION EPIDURAL; INFILTRATION; INTRACAUDAL; PERINEURAL AS NEEDED
Status: DISCONTINUED | OUTPATIENT
Start: 2018-04-13 | End: 2018-04-13 | Stop reason: HOSPADM

## 2018-04-13 RX ORDER — OXYCODONE HYDROCHLORIDE 5 MG/1
5 TABLET ORAL
Qty: 80 TAB | Refills: 0 | Status: SHIPPED | OUTPATIENT
Start: 2018-04-13 | End: 2019-03-07

## 2018-04-13 RX ORDER — OXYCODONE HYDROCHLORIDE 5 MG/1
5 TABLET ORAL ONCE
Status: COMPLETED | OUTPATIENT
Start: 2018-04-13 | End: 2018-04-13

## 2018-04-13 RX ORDER — LIDOCAINE HYDROCHLORIDE ANHYDROUS AND DEXTROSE MONOHYDRATE .8; 5 G/100ML; G/100ML
INJECTION, SOLUTION INTRAVENOUS
Status: DISCONTINUED | OUTPATIENT
Start: 2018-04-13 | End: 2018-04-13 | Stop reason: HOSPADM

## 2018-04-13 RX ORDER — OXYCODONE HYDROCHLORIDE 5 MG/1
5 TABLET ORAL
Status: CANCELLED | OUTPATIENT
Start: 2018-04-13

## 2018-04-13 RX ORDER — LEVOTHYROXINE SODIUM 50 UG/1
50 TABLET ORAL
Status: CANCELLED | OUTPATIENT
Start: 2018-04-13

## 2018-04-13 RX ORDER — SULFAMETHOXAZOLE AND TRIMETHOPRIM 400; 80 MG/1; MG/1
1 TABLET ORAL DAILY
Status: CANCELLED | OUTPATIENT
Start: 2018-04-13

## 2018-04-13 RX ADMIN — LIDOCAINE HYDROCHLORIDE ANHYDROUS AND DEXTROSE MONOHYDRATE 2 MG/KG/HR: .8; 5 INJECTION, SOLUTION INTRAVENOUS at 07:49

## 2018-04-13 RX ADMIN — FENTANYL CITRATE 25 MCG: 50 INJECTION, SOLUTION INTRAMUSCULAR; INTRAVENOUS at 10:00

## 2018-04-13 RX ADMIN — ACETAMINOPHEN 1000 MG: 10 INJECTION, SOLUTION INTRAVENOUS at 08:50

## 2018-04-13 RX ADMIN — FENTANYL CITRATE 50 MCG: 50 INJECTION, SOLUTION INTRAMUSCULAR; INTRAVENOUS at 09:19

## 2018-04-13 RX ADMIN — ROCURONIUM BROMIDE 30 MG: 10 INJECTION, SOLUTION INTRAVENOUS at 08:10

## 2018-04-13 RX ADMIN — SUCCINYLCHOLINE CHLORIDE 140 MG: 20 INJECTION INTRAMUSCULAR; INTRAVENOUS at 07:38

## 2018-04-13 RX ADMIN — ONDANSETRON 4 MG: 2 INJECTION INTRAMUSCULAR; INTRAVENOUS at 08:10

## 2018-04-13 RX ADMIN — FENTANYL CITRATE 25 MCG: 50 INJECTION, SOLUTION INTRAMUSCULAR; INTRAVENOUS at 09:35

## 2018-04-13 RX ADMIN — PROPOFOL 100 MCG/KG/MIN: 10 INJECTION, EMULSION INTRAVENOUS at 07:49

## 2018-04-13 RX ADMIN — FENTANYL CITRATE 150 MCG: 50 INJECTION, SOLUTION INTRAMUSCULAR; INTRAVENOUS at 07:37

## 2018-04-13 RX ADMIN — FENTANYL CITRATE 25 MCG: 50 INJECTION, SOLUTION INTRAMUSCULAR; INTRAVENOUS at 09:45

## 2018-04-13 RX ADMIN — FENTANYL CITRATE 50 MCG: 50 INJECTION, SOLUTION INTRAMUSCULAR; INTRAVENOUS at 08:12

## 2018-04-13 RX ADMIN — MORPHINE SULFATE 2 MG: 4 INJECTION, SOLUTION INTRAMUSCULAR; INTRAVENOUS at 10:55

## 2018-04-13 RX ADMIN — LIDOCAINE HYDROCHLORIDE 75 MG: 20 INJECTION, SOLUTION EPIDURAL; INFILTRATION; INTRACAUDAL; PERINEURAL at 07:37

## 2018-04-13 RX ADMIN — OXYCODONE HYDROCHLORIDE 5 MG: 5 TABLET ORAL at 10:15

## 2018-04-13 RX ADMIN — MIDAZOLAM HYDROCHLORIDE 0.5 MG: 1 INJECTION, SOLUTION INTRAMUSCULAR; INTRAVENOUS at 07:28

## 2018-04-13 RX ADMIN — PROPOFOL 100 MG: 10 INJECTION, EMULSION INTRAVENOUS at 07:37

## 2018-04-13 RX ADMIN — FENTANYL CITRATE 50 MCG: 50 INJECTION, SOLUTION INTRAMUSCULAR; INTRAVENOUS at 07:26

## 2018-04-13 RX ADMIN — FENTANYL CITRATE 25 MCG: 50 INJECTION, SOLUTION INTRAMUSCULAR; INTRAVENOUS at 09:55

## 2018-04-13 RX ADMIN — MIDAZOLAM HYDROCHLORIDE 2 MG: 1 INJECTION, SOLUTION INTRAMUSCULAR; INTRAVENOUS at 07:27

## 2018-04-13 RX ADMIN — ROCURONIUM BROMIDE 5 MG: 10 INJECTION, SOLUTION INTRAVENOUS at 07:37

## 2018-04-13 RX ADMIN — Medication 2 G: at 07:54

## 2018-04-13 RX ADMIN — MIDAZOLAM HYDROCHLORIDE 1 MG: 1 INJECTION, SOLUTION INTRAMUSCULAR; INTRAVENOUS at 09:19

## 2018-04-13 RX ADMIN — SODIUM CHLORIDE, POTASSIUM CHLORIDE, SODIUM LACTATE AND CALCIUM CHLORIDE: 600; 310; 30; 20 INJECTION, SOLUTION INTRAVENOUS at 07:15

## 2018-04-13 RX ADMIN — DEXAMETHASONE SODIUM PHOSPHATE 5 MG: 4 INJECTION, SOLUTION INTRA-ARTICULAR; INTRALESIONAL; INTRAMUSCULAR; INTRAVENOUS; SOFT TISSUE at 08:10

## 2018-04-13 RX ADMIN — MORPHINE SULFATE 2 MG: 4 INJECTION, SOLUTION INTRAMUSCULAR; INTRAVENOUS at 10:30

## 2018-04-13 NOTE — ANESTHESIA POSTPROCEDURE EVALUATION
Post-Anesthesia Evaluation and Assessment    Patient: Ben Chadwick MRN: [id number]  SSN: [id number]    YOB: 1970  Age: 52 y.o. Sex: female       Cardiovascular Function/Vital Signs  Visit Vitals    /71    Pulse (!) 116    Temp 36.6 °C (97.8 °F)    Resp 17    Ht 5' 4.5\" (1.638 m)    Wt 82.6 kg (182 lb)    SpO2 92%    BMI 30.76 kg/m2       Patient is status post general anesthesia for Procedure(s):  C5-6 ANTERIOR CERVICAL DISCECTOMY FUSION. Nausea/Vomiting: None    Postoperative hydration reviewed and adequate. Pain:  Pain Scale 1: Numeric (0 - 10) (04/13/18 1030)  Pain Intensity 1: 8 (04/13/18 1030)   Managed    Neurological Status:   Neuro (WDL): Exceptions to WDL (04/13/18 1030)  Neuro  LUE Motor Response: Purposeful (04/13/18 1030)  LLE Motor Response: Purposeful (04/13/18 1030)  RUE Motor Response: Weak (04/13/18 1030)  RLE Motor Response: Purposeful (04/13/18 1030)   At baseline    Mental Status and Level of Consciousness: Arousable    Pulmonary Status:   O2 Device: Nasal cannula (04/13/18 0945)   Adequate oxygenation and airway patent    Complications related to anesthesia: None    Post-anesthesia assessment completed.  No concerns    Signed By: Hong Pichardo MD     April 13, 2018

## 2018-04-13 NOTE — DISCHARGE INSTRUCTIONS
After Hospital Care Plan:  Discharge Instructions Cervical (Neck) Spine Surgery Dr. Sukhdev Pickard    Patient Name: Chapis Mosley    Date of procedure: 4/13/2018  Date of discharge:      Procedure: Procedure(s):  C5-6 ANTERIOR CERVICAL DISCECTOMY FUSION  PCP: Anna Sahu MD    Follow up appointments   -follow up with Dr. Sukhdev Pickard in 2 weeks. Call [phone] to make an appointment as soon as you get home from the hospital.    When to call your Orthopaedic Surgeon:  -Difficulty swallowing that is worse than when you left the hospital.  -Signs of infection-if your incision is red; continues to have drainage; drainage has a foul odor or if you have a persistent fever over 101 degrees for 24 hours  -nausea or vomiting, severe headache  -changes in sensation in your arms or legs (numbness, tingling, loss of color)  -increased weakness-greater than before your surgery  -severe pain or pain not relieved by medications  -Signs of a blood clot in your leg-calf pain, tenderness, redness, swelling of lower leg    When to call your Primary Care Physician:  -Concerns about medical conditions such as diabetes, high blood pressure, asthma, congestive heart failure  -Call if blood sugars are elevated, persistent headache or dizziness, coughing or congestion, constipation or diarrhea, burning with urination, abnormal heart rate    When to call 911 and go to the nearest emergency room:  -acute onset of chest pain, shortness of breath, difficulty breathing    Activity  - You are going home a well person, be as active as possible. Your only exercise should be walking. Start with short frequent walks and increase your walking distance each day.  -Limit the amount of time you sit to 20-30 minute intervals. Sitting for prolonged periods of time will be uncomfortable for you following surgery.   - Do NOT lift anything over 5 pounds  -From now on, even when lifting light weight, bend with your knees and not your back.  -Do NOT do any neck exercises until you have been instructed by your doctor  -When you are in bed, you may lay on your back or on either side. Do NOT lie on your stomach    Cervical Collar (Aspen Collar)  -You are required to wear your cervical collar at all times; except when showering. You may remove the collar long enough to change the pads when needed and to change your dressing each day. -Do not bend or twist when your collar is off. It is best to have someone assist you when changing the pads and your dressing to prevent you from bending your neck. - Clean the pads on your neck collar every day by hand washing with a mild soap and water. Pat them dry with a towel and lay out to air dry. Do not use heat to dry the pads. Driving  -You may not drive or return to work until instructed by your physician. However, you may ride in the car for short periods of time. Incision Care  Your incision has been closed with absorbable sutures and the Zipline skin closure system. This will assist with healing. The Aruna Code is to remain on your incision for 2 weeks. A dry dressing (ABD and tape) will be placed over it and should be changed daily, for at least the first several days after your surgery. If you have no incisional drainage, you may leave the incision open to air if you wish, still leaving the Zipline in place. Please make sure to wash your hands prior to touching your dressing. You may take brief showers but do not run the water directly onto the wound. After your shower, blot your incision dry with a soft towel and replace the dry dressing. Do not allow the tape to come in contact with the Zipline. Do not rub or apply any lotions or ointments to your incision site. Do not soak or scrub your wound. The Zipline dressing will be removed during your two week follow-up appointment.  If you experience drainage leaking from underneath the Zipline or if it peels off before 2 weeks, please contact your orthopedic surgeons office. Showering  -You may shower in approximately 2 days after your surgery.    -Leave the dressing on during your shower. Do NOT allow the water to run directly onto your dressing. Once you get out of the shower, put on a dry dressing.  -Reminder- Make sure you put clean pads on your collar after your shower.    -Do not take a tub bath. Preventing blood clots  -You have been given T.E.D. stockings to wear. Continue to wear these for 7 days after your discharge. Put them on in the morning and take them off at night.    -They are used to increase your circulation and prevent blood clots from forming in your legs  -T. E.D. stockings can be machine washed, temperature not to exceed 160° F (71°C) and machine dried for 15 to 20 minutes, temperature not to exceed 250° F (121°C). Pain management  -Take pain medication as prescribed; decrease the amount you use as your pain lessens  -Do not wait until you are in extreme pain to take your medication.  -Avoid alcoholic beverages while taking pain medication    Pain Medication Safety  DO:  -Read the Medication Guide   -Take your medicine exactly as prescribed   -Store your medicine away from children and in a safe place   -Flush unused medicine down the toilet   -Call your healthcare provider for medical advice about side effects. You may report side effects to FDA at 1-891-FDA-1616.   -Please be aware that many medications contain Tylenol. We do not want you to over medicate so please read the information below as a guide. Do not take more than 4 Grams of Tylenol in a 24 hour period.   (There are 1000 milligrams in one Gram)                                                                                                                                                                                                    Percocet contains 325 mg of Tylenol per tablet (do not take more than 12 tablets in 24 hours)  Lortab contains 500 mg of Tylenol per tablet (do not take more than 8 tablets in 24 hours)  Norco contains 325 mg of Tylenol per tablet (do not take more than 12 tablets in 24 hours). DO NOT:  -Do not give your medicine to others   -Do not take medicine unless it was prescribed for you   -Do not stop taking your medicine without talking to your healthcare provider   -Do not break, chew, crush, dissolve, or inject your medicine. If you cannot  swallow your medicine whole, talk to your healthcare provider.  -Do not drink alcohol while taking this medicine  -Do not take anti-inflammatory medications or aspirin unless instructed by your     Physician. Diet  -resume usual diet; drink plenty of fluids; eat foods high in fiber  -It is important to have regular bowel movements. Pain medications may cause constipation. You may want to take a stool softener (such as Senokot-S or Colace) to prevent constipation. If constipation occurs, take a laxative (such as Dulcolax tablets, Milk of Magnesia, or a suppository). Laxatives should only be used if the above preventable measures have failed and you still have not had a bowel movement after three days.     Instructions Following Ambulatory Surgery    Activity  · As tolerated and directed by your doctor  · Bathe or shower as directed by your doctor    Diet  · Clear liquids until no nausea or vomiting; then light diet for the first day  · Advance to regular diet on second day, unless your doctor orders otherwise  · If nausea and vomiting continues, call your doctor    Pain  · Take pain medication as directed by your doctor  ·  Call your doctor if pain is NOT relieved by medication  · DO NOT take aspirin or blood thinners until directed by your doctor    Follow-Up Phone Calls  · Will be made nursing staff  · If you have any problems, call your doctor as needed    Call your doctor if  · Excessive bleeding that does not stop after holding mild pressure over the area  · Temperature of 101 degrees F or above  · Redness,excessive swelling or bruising, and/or green or yellow, smelly discharge from incision    After Anesthesia  · For the first 24 hours: DO NOT Drive, Drink alcoholic beverages, or Make important decisions  · Be aware of dizziness following anesthesia and while taking pain medication         Surgical Drain Care: Care Instructions  What is a surgical drain? After a surgery, fluid may collect inside your body in the surgical area. This makes an infection or other problems more likely. A surgical drain allows the fluid to flow out. The doctor will put a thin rubber tube into the area of your body where the fluid is likely to collect. The rubber tube will carry the fluid outside your body. The most common type of surgical drain carries the fluid into a collection bulb that you empty. This is called a Edwin-Herrera drain. The drain uses suction created by the bulb to pull the fluid from your body into the bulb. The rubber tube will probably be held in place by one or two stitches in your skin. Most people attach the bulb with a safety pin to clothing or near the bandage so that it doesn't flip around or pull on the stitches. When you first get the drain, the fluid will be bloody. It will change color from red to pink to a light yellow or clear as the wound heals and the fluid starts to go away. Your doctor may give you specific information on when you no longer need the drain and when it will be removed. In general, you will need the drain until you are collecting less than about 2 tablespoons of fluid in 24 hours. Follow-up care is a key part of your treatment and safety. Be sure to make and go to all appointments, and call your doctor if you are having problems. It's also a good idea to know your test results and keep a list of the medicines you take. How can you care for yourself at home? Fluid collection  Follow any instructions your doctor gives you.  How often you empty the bulb depends on how much fluid is draining. Empty the bulb when it is half full. To empty the bulb:  · Wash your hands with soap and water. · Take the plug out of the bulb. · Empty the bulb. If your doctor asks you to measure the fluid, empty the fluid into a measuring cup, and write down the color and how much you collected. Your doctor will want to know this information. How often you empty the bulb depends on how much fluid there is. Doctors often suggest emptying it when it's about half full. · Clean the plug with alcohol. · Squeeze the bulb until it is flat. This removes all the air from the bulb. You may need to put the bulb on a table or a counter to flatten it. · Keep the bulb flat and put the plug in. · The bulb should stay flat after you put the plug back in. This creates the suction that pulls the fluid into the bulb. · Empty the fluid into the toilet. · Wash your hands. Bandage care  You may have a bandage. Your doctor will tell you how often to change it. · Wash your hands with soap and water. · Take off the bandage from around the drain. · Clean the drain site and the skin around it with soap and water. Use gauze or a cotton swab. · When the site is dry, put on a new bandage. Drain care  Squeezing or \"milking\" the tube can help prevent clogs so that it drains correctly. Your doctor will tell you when you need to do this. In general, you do this when:  · You see a clot in the tube that is preventing fluid from draining. The clot may look like a dark, stringy lining. · You see fluid leaking around the tube where it goes into the skin. · You think there is no suction in the drain. To milk the tube:  · Use one hand to hold and pinch the tube where it leaves the skin. · With the other hand, pinch the tube with your thumb and first finger just below where you're holding it. · Slowly and firmly push your thumb and first finger down the tubing toward the bulb.   · Do this as many times as you need to. The clot should move down the tube and into the bulb. When should you call for help? Call your doctor now or seek immediate medical care if:  ? · You have signs of infection, such as:  ¨ Increased pain, swelling, warmth, or redness around the area. ¨ Red streaks leading from the area. ¨ Pus draining from the area. ¨ A fever. ? · You see a sudden change in the color or smell of the drainage. ? · The tube is coming loose where it leaves your skin. ? Watch closely for changes in your health, and be sure to contact your doctor if:  ? · You see a lot of fluid around the drain. ? · You cannot remove a clot from the tube by milking the tube. Where can you learn more? Go to http://praful-monserrat.info/. Enter K117 in the search box to learn more about \"Surgical Drain Care: Care Instructions. \"  Current as of: March 20, 2017  Content Version: 11.4  © 6955-6444 [employer]. Care instructions adapted under license by [employer] (which disclaims liability or warranty for this information). If you have questions about a medical condition or this instruction, always ask your healthcare professional. [place] [address] any warranty or liability for your use of this information.

## 2018-04-13 NOTE — IP AVS SNAPSHOT
[address] 
[phone] Patient: Jassi Garcia MRN: [id number] :1970 About your hospitalization You were admitted on:  2018 You last received care in the:  [facility] PACU You were discharged on:  2018 Why you were hospitalized Your primary diagnosis was:  Not on File Your diagnoses also included:  Cervical Stenosis Of Spine Follow-up Information Follow up With Details Comments Contact Info Sunny Ceja MD   [address] [place] [address] 
[phone] Discharge Orders None A check janeen indicates which time of day the medication should be taken. My Medications START taking these medications Instructions Each Dose to Equal  
 Morning Noon Evening Bedtime  
 oxyCODONE IR 5 mg immediate release tablet Commonly known as:  Silvio Ching Your last dose was: Your next dose is: Take 1 Tab by mouth every four (4) hours as needed for Pain. Max Daily Amount: 30 mg.  
 5 mg CONTINUE taking these medications Instructions Each Dose to Equal  
 Morning Noon Evening Bedtime BACTRIM  mg per tablet Generic drug:  trimethoprim-sulfamethoxazole Your last dose was: Your next dose is: Take 1 Tab by mouth daily. 1 Tab  
    
   
   
   
  
 BC HEADACHE POWDER PO Your last dose was: Your next dose is: Take 1 Packet by mouth daily as needed (headache). 1 Packet BYSTOLIC 5 mg tablet Generic drug:  nebivolol Your last dose was: Your next dose is: Take 5 mg by mouth daily as needed ('rapid heart beat'). 5 mg  
    
   
   
   
  
 cyanocobalamin 1,000 mcg/mL injection Commonly known as:  VITAMIN B12 Your last dose was: Your next dose is: 1,000 mcg by IntraMUSCular route every twenty-eight (28) days. 1000 mcg FLUoxetine 40 mg capsule Commonly known as:  PROzac Your last dose was: Your next dose is: Take 40 mg by mouth daily. 40 mg  
    
   
   
   
  
 metroNIDAZOLE 500 mg tablet Commonly known as:  FLAGYL Your last dose was: Your next dose is: Take 1 Tab by mouth three (3) times daily as needed for Diarrhea or Other. 500 mg  
    
   
   
   
  
 synthroid 50 mcg tablet Generic drug:  levothyroxine Your last dose was: Your next dose is: Take 50 mcg by mouth Daily (before breakfast). 50 mcg  
    
   
   
   
  
 traZODone 100 mg tablet Commonly known as:  Eward Sheets Your last dose was: Your next dose is: Take 100 mg by mouth nightly as needed for Sleep. 100 mg  
    
   
   
   
  
 VITAMIN D2 50,000 unit capsule Generic drug:  ergocalciferol Your last dose was: Your next dose is: Take 50,000 Units by mouth two (2) times a week. 'On Tuesdays and Thursdays or Fridays.'  
 [address] Units Where to Get Your Medications Information on where to get these meds will be given to you by the nurse or doctor. ! Ask your nurse or doctor about these medications  
  oxyCODONE IR 5 mg immediate release tablet Opioid Education Prescription Opioids: What You Need to Know: 
 
 
Follow up appointments  
-follow up with Dr. Basilio Cowart in 2 weeks. Call [phone] to make an appointment as soon as you get home from the hospital. 
 
When to call your Orthopaedic Surgeon: 
-Difficulty swallowing that is worse than when you left the hospital. 
-Signs of infection-if your incision is red; continues to have drainage; drainage has a foul odor or if you have a persistent fever over 101 degrees for 24 hours 
-nausea or vomiting, severe headache 
-changes in sensation in your arms or legs (numbness, tingling, loss of color) 
-increased weakness-greater than before your surgery 
-severe pain or pain not relieved by medications 
-Signs of a blood clot in your leg-calf pain, tenderness, redness, swelling of lower leg When to call your Primary Care Physician: 
-Concerns about medical conditions such as diabetes, high blood pressure, asthma, congestive heart failure 
-Call if blood sugars are elevated, persistent headache or dizziness, coughing or congestion, constipation or diarrhea, burning with urination, abnormal heart rate When to call 911 and go to the nearest emergency room: 
-acute onset of chest pain, shortness of breath, difficulty breathing Activity - You are going home a well person, be as active as possible. Your only exercise should be walking. Start with short frequent walks and increase your walking distance each day. 
-Limit the amount of time you sit to 20-30 minute intervals. Sitting for prolonged periods of time will be uncomfortable for you following surgery. - Do NOT lift anything over 5 pounds 
-From now on, even when lifting light weight, bend with your knees and not your back. 
-Do NOT do any neck exercises until you have been instructed by your doctor 
-When you are in bed, you may lay on your back or on either side. Do NOT lie on your stomach Cervical Collar (Aspen Collar) -You are required to wear your cervical collar at all times; except when showering. You may remove the collar long enough to change the pads when needed and to change your dressing each day. -Do not bend or twist when your collar is off. It is best to have someone assist you when changing the pads and your dressing to prevent you from bending your neck. - Clean the pads on your neck collar every day by hand washing with a mild soap and water. Pat them dry with a towel and lay out to air dry. Do not use heat to dry the pads. Driving 
-You may not drive or return to work until instructed by your physician. However, you may ride in the car for short periods of time. Incision Care Your incision has been closed with absorbable sutures and the Zipline skin closure system. This will assist with healing. The [place] Caitlin is to remain on your incision for 2 weeks. A dry dressing (ABD and tape) will be placed over it and should be changed daily, for at least the first several days after your surgery. If you have no incisional drainage, you may leave the incision open to air if you wish, still leaving the Zipline in place. Please make sure to wash your hands prior to touching your dressing. You may take brief showers but do not run the water directly onto the wound. After your shower, blot your incision dry with a soft towel and replace the dry dressing. Do not allow the tape to come in contact with the Zipline. Do not rub or apply any lotions or ointments to your incision site. Do not soak or scrub your wound. The Zipline dressing will be removed during your two week follow-up appointment. If you experience drainage leaking from underneath the Zipline or if it peels off before 2 weeks, please contact your orthopedic surgeons office. Showering 
-You may shower in approximately 2 days after your surgery. -Leave the dressing on during your shower. Do NOT allow the water to run directly onto your dressing. Once you get out of the shower, put on a dry dressing. 
-Reminder- Make sure you put clean pads on your collar after your shower.   
-Do not take a tub bath. Preventing blood clots 
-You have been given T.E.D. stockings to wear. Continue to wear these for 7 days after your discharge. Put them on in the morning and take them off at night.   
-They are used to increase your circulation and prevent blood clots from forming in your legs 
-T. E.D. stockings can be machine washed, temperature not to exceed 160° F (71°C) and machine dried for 15 to 20 minutes, temperature not to exceed 250° F (121°C). Pain management 
-Take pain medication as prescribed; decrease the amount you use as your pain lessens 
-Do not wait until you are in extreme pain to take your medication. 
-Avoid alcoholic beverages while taking pain medication Pain Medication Safety DO: 
-Read the Medication Guide  
-Take your medicine exactly as prescribed  
-Store your medicine away from children and in a safe place  
-Flush unused medicine down the toilet  
-Call your healthcare provider for medical advice about side effects. You may report side effects to FDA at 0-279-FDA-9852.  
-Please be aware that many medications contain Tylenol. We do not want you to over medicate so please read the information below as a guide. Do not take more than 4 Grams of Tylenol in a 24 hour period. (There are 1000 milligrams in one Gram) Percocet contains 325 mg of Tylenol per tablet (do not take more than 12 tablets in 24 hours) Lortab contains 500 mg of Tylenol per tablet (do not take more than 8 tablets in 24 hours) Norco contains 325 mg of Tylenol per tablet (do not take more than 12 tablets in 24 hours). DO NOT: 
-Do not give your medicine to others  
-Do not take medicine unless it was prescribed for you  
-Do not stop taking your medicine without talking to your healthcare provider  
-Do not break, chew, crush, dissolve, or inject your medicine. If you cannot  swallow your medicine whole, talk to your healthcare provider. 
-Do not drink alcohol while taking this medicine 
-Do not take anti-inflammatory medications or aspirin unless instructed by your     Physician. Diet 
-resume usual diet; drink plenty of fluids; eat foods high in fiber 
-It is important to have regular bowel movements. Pain medications may cause constipation. You may want to take a stool softener (such as Senokot-S or Colace) to prevent constipation. If constipation occurs, take a laxative (such as Dulcolax tablets, Milk of Magnesia, or a suppository). Laxatives should only be used if the above preventable measures have failed and you still have not had a bowel movement after three days. Instructions Following Ambulatory Surgery Activity · As tolerated and directed by your doctor · Bathe or shower as directed by your doctor Diet · Clear liquids until no nausea or vomiting; then light diet for the first day · Advance to regular diet on second day, unless your doctor orders otherwise · If nausea and vomiting continues, call your doctor Pain · Take pain medication as directed by your doctor ·  Call your doctor if pain is NOT relieved by medication · DO NOT take aspirin or blood thinners until directed by your doctor Follow-Up Phone Calls · Will be made nursing staff · If you have any problems, call your doctor as needed Call your doctor if 
· Excessive bleeding that does not stop after holding mild pressure over the area · Temperature of 101 degrees F or above · Redness,excessive swelling or bruising, and/or green or yellow, smelly discharge from incision After Anesthesia · For the first 24 hours: DO NOT Drive, Drink alcoholic beverages, or Make important decisions · Be aware of dizziness following anesthesia and while taking pain medication Surgical Drain Care: Care Instructions What is a surgical drain? After a surgery, fluid may collect inside your body in the surgical area. This makes an infection or other problems more likely. A surgical drain allows the fluid to flow out. The doctor will put a thin rubber tube into the area of your body where the fluid is likely to collect. The rubber tube will carry the fluid outside your body. The most common type of surgical drain carries the fluid into a collection bulb that you empty. This is called a Edwin-Herrera drain. The drain uses suction created by the bulb to pull the fluid from your body into the bulb. The rubber tube will probably be held in place by one or two stitches in your skin. Most people attach the bulb with a safety pin to clothing or near the bandage so that it doesn't flip around or pull on the stitches. When you first get the drain, the fluid will be bloody. It will change color from red to pink to a light yellow or clear as the wound heals and the fluid starts to go away. Your doctor may give you specific information on when you no longer need the drain and when it will be removed. In general, you will need the drain until you are collecting less than about 2 tablespoons of fluid in 24 hours. Follow-up care is a key part of your treatment and safety. Be sure to make and go to all appointments, and call your doctor if you are having problems. It's also a good idea to know your test results and keep a list of the medicines you take. How can you care for yourself at home? Fluid collection Follow any instructions your doctor gives you.  How often you empty the bulb depends on how much fluid is draining. Empty the bulb when it is half full. To empty the bulb: 
· Wash your hands with soap and water. · Take the plug out of the bulb. · Empty the bulb. If your doctor asks you to measure the fluid, empty the fluid into a measuring cup, and write down the color and how much you collected. Your doctor will want to know this information. How often you empty the bulb depends on how much fluid there is. Doctors often suggest emptying it when it's about half full. · Clean the plug with alcohol. · Squeeze the bulb until it is flat. This removes all the air from the bulb. You may need to put the bulb on a table or a counter to flatten it. · Keep the bulb flat and put the plug in. · The bulb should stay flat after you put the plug back in. This creates the suction that pulls the fluid into the bulb. · Empty the fluid into the toilet. · Wash your hands. Bandage care You may have a bandage. Your doctor will tell you how often to change it. · Wash your hands with soap and water. · Take off the bandage from around the drain. · Clean the drain site and the skin around it with soap and water. Use gauze or a cotton swab. · When the site is dry, put on a new bandage. Drain care Squeezing or \"milking\" the tube can help prevent clogs so that it drains correctly. Your doctor will tell you when you need to do this. In general, you do this when: 
· You see a clot in the tube that is preventing fluid from draining. The clot may look like a dark, stringy lining. · You see fluid leaking around the tube where it goes into the skin. · You think there is no suction in the drain. To milk the tube: · Use one hand to hold and pinch the tube where it leaves the skin. · With the other hand, pinch the tube with your thumb and first finger just below where you're holding it. · Slowly and firmly push your thumb and first finger down the tubing toward the bulb. · Do this as many times as you need to. The clot should move down the tube and into the bulb. When should you call for help? Call your doctor now or seek immediate medical care if: 
? · You have signs of infection, such as: 
¨ Increased pain, swelling, warmth, or redness around the area. ¨ Red streaks leading from the area. ¨ Pus draining from the area. ¨ A fever. ? · You see a sudden change in the color or smell of the drainage. ? · The tube is coming loose where it leaves your skin. ? Watch closely for changes in your health, and be sure to contact your doctor if: 
? · You see a lot of fluid around the drain. ? · You cannot remove a clot from the tube by milking the tube. Where can you learn more? Go to http://praful-monserrat.info/. Enter K117 in the search box to learn more about \"Surgical Drain Care: Care Instructions. \" Current as of: March 20, 2017 Content Version: 11.4 © 1759-7132 [employer]. Care instructions adapted under license by [employer] (which disclaims liability or warranty for this information). If you have questions about a medical condition or this instruction, always ask your healthcare professional. [place] [address] any warranty or liability for your use of this information. Introducing [facility]! [facility] introduces [employer] Badge Style patient portal. Now you can access parts of your medical record, email your doctor's office, and request medication refills online. 1. In your internet browser, go to [email or web address]/"[employer]" 2. Click on the First Time User? Click Here link in the Sign In box. You will see the New Member Sign Up page. 3. Enter your [employer] Access Code exactly as it appears below. You will not need to use this code after youve completed the sign-up process. If you do not sign up before the expiration date, you must request a new code. · [employer] Access Code: [email or web address] Expires: 5/2/2018 11:56 AM 
 
4. Enter the last four digits of your Social Security Number (xxxx) and Date of Birth (mm/dd/yyyy) as indicated and click Submit. You will be taken to the next sign-up page. 5. Create a [employer] D[employer] ID. This will be your [employer] login ID and cannot be changed, so think of one that is secure and easy to remember. 6. Create a [employer] password. You can change your password at any time. 7. Enter your Password Reset Question and Answer. This can be used at a later time if you forget your password. 8. Enter your e-mail address. You will receive e-mail notification when new information is available in [address] 9. Click Sign Up. You can now view and download portions of your medical record. 10. Click the Download Summary menu link to download a portable copy of your medical information. If you have questions, please visit the Frequently Asked Questions section of the [employer] website. Remember, [employer] is NOT to be used for urgent needs. For medical emergencies, dial 911. Now available from your iPhone and Android! Introducing Malachi Herrera As a [employer] patient, I wanted to make you aware of our electronic visit tool called Malachi Mynorrebeccachristine. [employer] 24/7 allows you to connect within minutes with a medical provider 24 hours a day, seven days a week via a mobile device or tablet or logging into a secure website from your computer. You can access Malachi Herrera from anywhere in the [place]. A virtual visit might be right for you when you have a simple condition and feel like you just dont want to get out of bed, or cant get away from work for an appointment, when your regular [employer] provider is not available (evenings, weekends or holidays), or when youre out of town and need minor care.   Electronic visits cost only $49 and if the [employer] [facility] 24/7 provider determines a prescription is needed to treat your condition, one can be electronically transmitted to a nearby pharmacy*. Please take a moment to enroll today if you have not already done so. The enrollment process is free and takes just a few minutes. To enroll, please download the [employer] 24/7 jeffrey to your tablet or phone, or visit [email or web address] to enroll on your computer. And, as an [address] [place] [address] patient with a [employer] account, the results of your visits will be scanned into your electronic medical record and your primary care provider will be able to view the scanned results. We urge you to continue to see your regular [facility] provider for your ongoing medical care. And while your primary care provider may not be the one available when you seek a For Your Imaginationrebeccafin virtual visit, the peace of mind you get from getting a real diagnosis real time can be priceless. For more information on [employer], view our Frequently Asked Questions (FAQs) at [email or web address]. Sincerely, 
 
Freddy Herring MD 
Chief Medical Officer [facility] Emmy Al *:  certain medications cannot be prescribed via [employer] Unresulted Labs-Please follow up with your PCP about these lab tests Order Current Status XR FLUOROSCOPY OVER 60 MINUTES In process Providers Seen During Your Hospitalization Provider Specialty Primary office phone Sudhakar Prater MD Orthopedic Surgery [phone] Your Primary Care Physician (PCP) Primary Care Physician Office Phone Office Fax Bev Blackwood [phone] You are allergic to the following No active allergies Recent Documentation Height Weight BMI OB Status Smoking Status 1.638 m 82.6 kg 30.76 kg/m2 Hysterectomy Never Smoker Emergency Contacts Name Discharge Info Relation Home Work Mobile Barber Dawson DISCHARGE CAREGIVER [3] Spouse [3] [phone] Patient Belongings The following personal items are in your possession at time of discharge: 
  Dental Appliances: None  Visual Aid: Glasses, At home   Hearing Aids/Status:  (NA)  Home Medications: None   Jewelry: None  Clothing: Other (comment) (clothes bag to pacu)    Other Valuables: None Discharge Instructions Attachments/References MEFS - OXYCODONE, RAPID RELEASE (ETH-OXYDOSE, OXY IR, ROXICODONE) - (BY MOUTH) (ENGLISH) Patient Handouts Oxycodone, Rapid Release (ETH-Oxydose, Oxy IR, Roxicodone) - (By mouth) Why this medicine is used:  
Treats moderate to severe pain. This medicine is a narcotic pain reliever. Contact a nurse or doctor right away if you have: 
· Fast or slow heart beat, shallow breathing, blue lips, skin or fingernails · Anxiety, restlessness, fever, sweating, muscle spasms, twitching, seeing or hearing things that are not there · Extreme weakness, shallow breathing, slow heartbeat · Severe confusion, lightheadedness, dizziness, fainting · Sweating or cold, clammy skin, seizures · Severe constipation, stomach pain, nausea, vomiting Common side effects: · Mild constipation · Sleepiness, tiredness © 2017 [address] Information is for End User's use only and may not be sold, redistributed or otherwise used for commercial purposes. Please provide this summary of care documentation to your next provider. Signatures-by signing, you are acknowledging that this After Visit Summary has been reviewed with you and you have received a copy. Patient Signature:  ____________________________________________________________ Date:  ____________________________________________________________  
  
Larri Hazard Provider Signature:  ____________________________________________________________ Date:  ____________________________________________________________

## 2018-04-13 NOTE — OP NOTES
[address]   ACUTE CARE OP NOTE    Makayla Rojas  MR#: [id number]  : 1970  ACCOUNT #: [de-identified]   DATE OF SERVICE: 2018    PREOPERATIVE DIAGNOSES:  Cervical spondylosis, C5-6, cervical stenosis, C5-6, degenerative C5-6. POSTOPERATIVE DIAGNOSES:  Cervical spondylosis, C5-6, cervical stenosis, C5-6, degenerative C5-6. PROCEDURE:  Anterior cervical diskectomy, C5-6, anterior interbody fusion, C5-6, anterior plate fixation, F1-H8. SURGEON: Mihci Broussard MD    ASSISTANT:  Lida Sherman PA-C    ESTIMATED BLOOD LOSS:  Minimal.    COMPLICATIONS:  None. ANESTHESIA:  General.    SPECIMENS REMOVED:  None. IMPLANTS:  ORthofix Cetra. INDICATIONS:  A pleasant 51-year-old female with history of neck pain, bilateral arm pain and shoulder pain. Spondylosis C5-6. Severe foraminal stenosis and central stenosis. The patient failed conservative treatment, understood the risks and benefits and elected to proceed. DESCRIPTION OF PROCEDURE:  Patient was identified and brought to the operative suite and underwent general anesthesia without difficulty. She was placed in the supine position on the Aaron frame with all bony prominences well-padded. Ten pounds of traction across the table with a chin strap. Neck was prepped and draped sterilely. After prepping and draping, a timeout was performed. Transverse incision was made approximately at the cricoid cartilage on the left-hand side. Dissection was carried to the platysma. Hemostasis with bipolar cautery. We then confirmed the appropriate level and then placed radiolucent retractors. Anterior osteophytes were removed followed by placement of annulotomy followed by complete diskectomy at C5-6. Cartilaginous endplates were removed, remainder of disk material.  This allowed access to the posterior vertebral osteophytes, which were then resected.   We elevated the posterior longitudinal ligament as well and resected that for a more central decompression as well. We did bilateral foraminotomies as well with under cutting the uncinate process with a  #1 and #2 Kerrisons. After satisfactory central and bilateral decompression, we then did trial spacers with a 14 x 12 Choice Ascending graft. We chose a 7 mm height which provided good anterior reconstruction as well as restoration of foraminal height. Endplates were lightly decorticated with the occluded rasp followed by placement of the Choice Ascending graft 14 x 12 x 7. This was then tracked back to the small Molly allograft. Neuromonitoring was stable. Weight was removed off the head. We then contoured an 18 mm Orthofix CETRA plate to the anterior cervical spine. Temporary fixation with a threaded pin followed by 12 mm screws in C5-C6 with good fixation obtained. A small bursal seal was then placed over the graft. Wound was thoroughly irrigated. A #7 flat CIRILO drain was placed, 2-0 Vicryl subcutaneous layer and 3-0 Vicryl to skin in a zip line closure.       MD Anel Faria / MO  D: 04/13/2018 09:17     T: 04/13/2018 12:09  JOB #: [id number]

## 2018-04-13 NOTE — ANESTHESIA PREPROCEDURE EVALUATION
Anesthetic History   No history of anesthetic complications            Review of Systems / Medical History  Patient summary reviewed, nursing notes reviewed and pertinent labs reviewed    Pulmonary  Within defined limits                 Neuro/Psych   Within defined limits           Cardiovascular  Within defined limits                     GI/Hepatic/Renal  Within defined limits              Endo/Other      Hypothyroidism    Pertinent negatives: No morbid obesity   Other Findings            Physical Exam    Airway  Mallampati: II  TM Distance: > 6 cm  Neck ROM: normal range of motion   Mouth opening: Normal     Cardiovascular  Regular rate and rhythm,  S1 and S2 normal,  no murmur, click, rub, or gallop             Dental  No notable dental hx       Pulmonary  Breath sounds clear to auscultation               Abdominal  GI exam deferred       Other Findings            Anesthetic Plan    ASA: 2  Anesthesia type: general          Induction: Intravenous  Anesthetic plan and risks discussed with: Patient

## 2018-04-13 NOTE — PERIOP NOTES
Pt up OOB and ambulated with standby assist. No numbness or tingling or dizziness. Continues moderate weakness RUE. Reviewed activity limitations. Pt verbalized understanding and copy of written instructions on discharge papers.

## 2018-04-13 NOTE — BRIEF OP NOTE
BRIEF OPERATIVE NOTE    Date of Procedure: 4/13/2018   Preoperative Diagnosis: RADICULITIS, CHRONIC NECK PAIN  Postoperative Diagnosis: RADICULITIS, CHRONIC NECK PAIN    Procedure(s):  C5-6 ANTERIOR CERVICAL DISCECTOMY FUSION  Surgeon(s) and Role:     * Aleksandra Patrick MD - Primary         Surgical Assistant: Dion Mello PA-C    Surgical Staff:  Circ-1: Lucy Burgess  Physician Assistant: THELMA Winter  Scrub RN-1: Ajay Peña RN  Event Time In   Incision Start 3583   Incision Close      Anesthesia: General   Estimated Blood Loss: See full op note  Specimens: * No specimens in log *   Findings: Stenosis   Complications: None  Implants:   Implant Name Type Inv.  Item Serial No.  Lot No. LRB No. Used Action   GRAFT BNE ELITE LEILA SM --  - [id number]  GRAFT BNE ELITE LEILA SM --  [id number] MUSCULOSKELETAL TRANS 4710 N/A 1 Implanted   MEMBRANE AMNIOTIC WND 3X4CM -- VERSASHIELD - [id number]  MEMBRANE AMNIOTIC WND 3X4CM -- VERSASHIELD [id number] ORTHOFIX INC 7210 N/A 1 Implanted   Lnlkzebcf-Vg-YMcltzt Lordotic    NA CHOICE SPINE [id number] N/A 1 Implanted   Screws   NA ORTHOFIX SPINAL IMPLANTS NA N/A 4 Implanted   INSERT TIB UNIV LPS XSM 18MM --  - SNA   INSERT TIB UNIV LPS XSM 18MM --  NA [employer] NA N/A 1 Implanted

## 2018-04-23 ENCOUNTER — HOSPITAL ENCOUNTER (EMERGENCY)
Age: 48
Discharge: HOME | End: 2018-04-23
Attending: EMERGENCY MEDICINE
Payer: COMMERCIAL

## 2018-04-23 ENCOUNTER — APPOINTMENT (OUTPATIENT)
Dept: CT IMAGING | Age: 48
End: 2018-04-23
Attending: PHYSICIAN ASSISTANT
Payer: COMMERCIAL

## 2018-04-23 VITALS
RESPIRATION RATE: 16 BRPM | HEART RATE: 105 BPM | BODY MASS INDEX: 30.83 KG/M2 | OXYGEN SATURATION: 94 % | TEMPERATURE: 98.7 F | WEIGHT: 180.56 LBS | DIASTOLIC BLOOD PRESSURE: 78 MMHG | SYSTOLIC BLOOD PRESSURE: 115 MMHG | HEIGHT: 64 IN

## 2018-04-23 DIAGNOSIS — R51.9 FACIAL PAIN: ICD-10-CM

## 2018-04-23 DIAGNOSIS — M54.2 NECK PAIN: Primary | ICD-10-CM

## 2018-04-23 LAB
ANION GAP SERPL CALC-SCNC: 7 MMOL/L (ref 5–15)
APPEARANCE UR: CLEAR
BACTERIA URNS QL MICRO: NEGATIVE /HPF
BASOPHILS # BLD: 0 K/UL (ref 0–0.1)
BASOPHILS NFR BLD: 0 % (ref 0–1)
BILIRUB UR QL CFM: NEGATIVE
BUN SERPL-MCNC: 19 MG/DL (ref 6–20)
BUN/CREAT SERPL: 37 (ref 12–20)
CALCIUM SERPL-MCNC: 8.3 MG/DL (ref 8.5–10.1)
CHLORIDE SERPL-SCNC: 108 MMOL/L (ref 97–108)
CO2 SERPL-SCNC: 25 MMOL/L (ref 21–32)
COLOR UR: ABNORMAL
CREAT SERPL-MCNC: 0.52 MG/DL (ref 0.55–1.02)
DIFFERENTIAL METHOD BLD: NORMAL
EOSINOPHIL # BLD: 0.1 K/UL (ref 0–0.4)
EOSINOPHIL NFR BLD: 2 % (ref 0–7)
EPITH CASTS URNS QL MICRO: ABNORMAL /LPF
ERYTHROCYTE [DISTWIDTH] IN BLOOD BY AUTOMATED COUNT: 11.9 % (ref 11.5–14.5)
GLUCOSE SERPL-MCNC: 79 MG/DL (ref 65–100)
GLUCOSE UR STRIP.AUTO-MCNC: NEGATIVE MG/DL
HCT VFR BLD AUTO: 42.8 % (ref 35–47)
HGB BLD-MCNC: 13.6 G/DL (ref 11.5–16)
HGB UR QL STRIP: NEGATIVE
HYALINE CASTS URNS QL MICRO: ABNORMAL /LPF (ref 0–5)
IMM GRANULOCYTES # BLD: 0 K/UL (ref 0–0.04)
IMM GRANULOCYTES NFR BLD AUTO: 0 % (ref 0–0.5)
KETONES UR QL STRIP.AUTO: 40 MG/DL
LACTATE SERPL-SCNC: 0.5 MMOL/L (ref 0.4–2)
LEUKOCYTE ESTERASE UR QL STRIP.AUTO: NEGATIVE
LYMPHOCYTES # BLD: 1.2 K/UL (ref 0.8–3.5)
LYMPHOCYTES NFR BLD: 23 % (ref 12–49)
MCH RBC QN AUTO: 31.3 PG (ref 26–34)
MCHC RBC AUTO-ENTMCNC: 31.8 G/DL (ref 30–36.5)
MCV RBC AUTO: 98.6 FL (ref 80–99)
MONOCYTES # BLD: 0.4 K/UL (ref 0–1)
MONOCYTES NFR BLD: 7 % (ref 5–13)
NEUTS SEG # BLD: 3.7 K/UL (ref 1.8–8)
NEUTS SEG NFR BLD: 68 % (ref 32–75)
NITRITE UR QL STRIP.AUTO: NEGATIVE
NRBC # BLD: 0 K/UL (ref 0–0.01)
NRBC BLD-RTO: 0 PER 100 WBC
PH UR STRIP: 6 [PH] (ref 5–8)
PLATELET # BLD AUTO: 225 K/UL (ref 150–400)
PMV BLD AUTO: 9.8 FL (ref 8.9–12.9)
POTASSIUM SERPL-SCNC: 3.9 MMOL/L (ref 3.5–5.1)
PROT UR STRIP-MCNC: NEGATIVE MG/DL
RBC # BLD AUTO: 4.34 M/UL (ref 3.8–5.2)
RBC #/AREA URNS HPF: ABNORMAL /HPF (ref 0–5)
SODIUM SERPL-SCNC: 140 MMOL/L (ref 136–145)
SP GR UR REFRACTOMETRY: 1.01 (ref 1–1.03)
UA: UC IF INDICATED,UAUC: ABNORMAL
UROBILINOGEN UR QL STRIP.AUTO: 1 EU/DL (ref 0.2–1)
WBC # BLD AUTO: 5.5 K/UL (ref 3.6–11)
WBC URNS QL MICRO: ABNORMAL /HPF (ref 0–4)

## 2018-04-23 PROCEDURE — 80048 BASIC METABOLIC PNL TOTAL CA: CPT | Performed by: PHYSICIAN ASSISTANT

## 2018-04-23 PROCEDURE — 74011636320 HC RX REV CODE- 636/320: Performed by: PHYSICIAN ASSISTANT

## 2018-04-23 PROCEDURE — 72126 CT NECK SPINE W/DYE: CPT

## 2018-04-23 PROCEDURE — 96361 HYDRATE IV INFUSION ADD-ON: CPT

## 2018-04-23 PROCEDURE — 99283 EMERGENCY DEPT VISIT LOW MDM: CPT

## 2018-04-23 PROCEDURE — 74011250636 HC RX REV CODE- 250/636: Performed by: PHYSICIAN ASSISTANT

## 2018-04-23 PROCEDURE — 83605 ASSAY OF LACTIC ACID: CPT | Performed by: PHYSICIAN ASSISTANT

## 2018-04-23 PROCEDURE — 96360 HYDRATION IV INFUSION INIT: CPT

## 2018-04-23 PROCEDURE — 85025 COMPLETE CBC W/AUTO DIFF WBC: CPT | Performed by: PHYSICIAN ASSISTANT

## 2018-04-23 PROCEDURE — 36415 COLL VENOUS BLD VENIPUNCTURE: CPT | Performed by: PHYSICIAN ASSISTANT

## 2018-04-23 PROCEDURE — 81001 URINALYSIS AUTO W/SCOPE: CPT | Performed by: PHYSICIAN ASSISTANT

## 2018-04-23 PROCEDURE — 74011250637 HC RX REV CODE- 250/637: Performed by: EMERGENCY MEDICINE

## 2018-04-23 RX ORDER — SODIUM CHLORIDE 9 MG/ML
50 INJECTION, SOLUTION INTRAVENOUS
Status: DISCONTINUED | OUTPATIENT
Start: 2018-04-23 | End: 2018-04-23 | Stop reason: HOSPADM

## 2018-04-23 RX ORDER — OXYCODONE HYDROCHLORIDE 5 MG/1
5 TABLET ORAL
Status: COMPLETED | OUTPATIENT
Start: 2018-04-23 | End: 2018-04-23

## 2018-04-23 RX ORDER — SODIUM CHLORIDE 0.9 % (FLUSH) 0.9 %
10 SYRINGE (ML) INJECTION
Status: COMPLETED | OUTPATIENT
Start: 2018-04-23 | End: 2018-04-23

## 2018-04-23 RX ORDER — OXYCODONE AND ACETAMINOPHEN 5; 325 MG/1; MG/1
2 TABLET ORAL
Status: DISCONTINUED | OUTPATIENT
Start: 2018-04-23 | End: 2018-04-23

## 2018-04-23 RX ADMIN — SODIUM CHLORIDE 50 ML/HR: 900 INJECTION, SOLUTION INTRAVENOUS at 15:47

## 2018-04-23 RX ADMIN — IOPAMIDOL 100 ML: 755 INJECTION, SOLUTION INTRAVENOUS at 15:47

## 2018-04-23 RX ADMIN — Medication 10 ML: at 16:45

## 2018-04-23 RX ADMIN — SODIUM CHLORIDE 1000 ML: 900 INJECTION, SOLUTION INTRAVENOUS at 16:45

## 2018-04-23 RX ADMIN — OXYCODONE HYDROCHLORIDE 5 MG: 5 TABLET ORAL at 17:03

## 2018-04-23 NOTE — ED NOTES
Received patient to exam room, sitting in a chair in a position of comfort, call bell within reach; pt is anterior approach cervical fusion on 4/13/18, states she got her drain caught on Friday and pulled it partially out, since then she has left sided facial and neck pain with general malaise

## 2018-04-23 NOTE — ED NOTES
[place], RN confirmed with CT that pt is at CT at this time and will return to room 20. Pt ready for pt.

## 2018-04-23 NOTE — ED NOTES
Assumed care of pt. Dr Diane Durbin at bedside. Pt presents post op surgery from cervical fusion 4/13/18. Pt states that the post op drain was accidentally pulled and she has had pain 7/10 on left side of face, c/o earache, left eye (vision) sensitivity, pt describes \" a lot of the tube\" came out. Pt has jpratt in place with tubing taped to chest. Pt did not report issue to her surgeon. Pt states she has general malaise. Pt AOx3. Pt clear on auscultation. Denies chest pain. Call bell within reach.

## 2018-04-23 NOTE — DISCHARGE INSTRUCTIONS
Neck Pain: Care Instructions  Your Care Instructions    You can have neck pain anywhere from the bottom of your head to the top of your shoulders. It can spread to the upper back or arms. Injuries, painting a ceiling, sleeping with your neck twisted, staying in one position for too long, and many other activities can cause neck pain. Most neck pain gets better with home care. Your doctor may recommend medicine to relieve pain or relax your muscles. He or she may suggest exercise and physical therapy to increase flexibility and relieve stress. You may need to wear a special (cervical) collar to support your neck for a day or two. Follow-up care is a key part of your treatment and safety. Be sure to make and go to all appointments, and call your doctor if you are having problems. It's also a good idea to know your test results and keep a list of the medicines you take. How can you care for yourself at home? · Try using a heating pad on a low or medium setting for 15 to 20 minutes every 2 or 3 hours. Try a warm shower in place of one session with the heating pad. · You can also try an ice pack for 10 to 15 minutes every 2 to 3 hours. Put a thin cloth between the ice and your skin. · Take pain medicines exactly as directed. ¨ If the doctor gave you a prescription medicine for pain, take it as prescribed. ¨ If you are not taking a prescription pain medicine, ask your doctor if you can take an over-the-counter medicine. · If your doctor recommends a cervical collar, wear it exactly as directed. When should you call for help? Call your doctor now or seek immediate medical care if:  ? · You have new or worsening numbness in your arms, buttocks or legs. ? · You have new or worsening weakness in your arms or legs. (This could make it hard to stand up.)   ? · You lose control of your bladder or bowels. ? Watch closely for changes in your health, and be sure to contact your doctor if:  ? · Your neck pain is getting worse. ? · You are not getting better after 1 week. ? · You do not get better as expected. Where can you learn more? Go to http://praful-monserrat.info/. Enter [id number] in the search box to learn more about \"Neck Pain: Care Instructions. \"  Current as of: March 21, 2017  Content Version: 11.5  © 7883-8073 [employer]. Care instructions adapted under license by [employer] (which disclaims liability or warranty for this information). If you have questions about a medical condition or this instruction, always ask your healthcare professional. [place] [address] any warranty or liability for your use of this information. Head or Face Pain: Care Instructions  Your Care Instructions    Common causes of head or face pain are allergies, stress, and injuries. Other causes include tooth problems and sinus infections. Eating certain foods, such as chocolate or cheese, or drinking certain liquids, such as coffee or cola, can cause head pain for some people. If you have mild head pain, you may not need treatment. It is important to watch your symptoms and talk to your doctor if your pain continues or gets worse. Follow-up care is a key part of your treatment and safety. Be sure to make and go to all appointments, and call your doctor if you are having problems. It's also a good idea to know your test results and keep a list of the medicines you take. How can you care for yourself at home? · Take pain medicines exactly as directed. ¨ If the doctor gave you a prescription medicine for pain, take it as prescribed. ¨ If you are not taking a prescription pain medicine, ask your doctor if you can take an over-the-counter pain medicine. · Take it easy for the next few days or longer if you are not feeling well. · Use a warm, moist towel or heating pad set on low to relax tight muscles in your shoulder and neck.  Have someone gently massage your neck and shoulders. · Put ice or a cold pack on the area for 10 to 20 minutes at a time. Put a thin cloth between the ice and your skin. When should you call for help? Call 911 anytime you think you may need emergency care. For example, call if:  ? · You have twitching, jerking, or a seizure. ? · You passed out (lost consciousness). ? · You have symptoms of a stroke. These may include:  ¨ Sudden numbness, tingling, weakness, or loss of movement in your face, arm, or leg, especially on only one side of your body. ¨ Sudden vision changes. ¨ Sudden trouble speaking. ¨ Sudden confusion or trouble understanding simple statements. ¨ Sudden problems with walking or balance. ¨ A sudden, severe headache that is different from past headaches. ? · You have jaw pain and pain in your chest, shoulder, neck, or arm. ?Call your doctor now or seek immediate medical care if:  ? · You have a fever with a stiff neck or a severe headache. ? · You have nausea and vomiting, or you cannot keep food or liquids down. ? Watch closely for changes in your health, and be sure to contact your doctor if:  ? · Your head or face pain does not get better as expected. Where can you learn more? Go to http://praful-monserrat.info/. Enter P568 in the search box to learn more about \"Head or Face Pain: Care Instructions. \"  Current as of: March 20, 2017  Content Version: 11.4  © 1503-1919 [employer]. Care instructions adapted under license by [employer] (which disclaims liability or warranty for this information). If you have questions about a medical condition or this instruction, always ask your healthcare professional. [place] [address] any warranty or liability for your use of this information.

## 2018-04-23 NOTE — ED PROVIDER NOTES
EMERGENCY DEPARTMENT HISTORY AND PHYSICAL EXAM      Date: 4/23/2018  Patient Name: Ross Andrews     I have seen and evaluated this patient in the Express Care portion of triage for unilateral head pain, malaise, weakness, and neck pain after cervical fusion. The patients care will begin now and orders have been placed. This patient will be seen and provided further care in the Emergency Room. Written by Chitra Mcguire, tosha scribe for ALEENA Kim. History of Presenting Illness     Chief Complaint   Patient presents with    Neck Pain     pt reported she had a C5-C6 fusion on 4/13. Pt has drain in placed from surgery which the drain fell and pulled really hard x3 days ago. Pt reported she tried to put the drain back in place and it is still draining per patient but she is having pains in the left side where the drain is. Pt reported she hasn't called her surgeons office about this issue. History Provided By: Patient    HPI: Ross Andrews, 52 y.o. female with PMHx significant for arthritis, anemia, hypoglycemia, arrhythmia, sarcoidosis, presents ambulatory to the ED with cc of stabbing, intermittent L sided face pain that radiates down to her R side of her neck, along with associated nausea and subjective fever x 3 days. Pt reports having a C5-C6 spinal fusion with no complications on 47/04 performed by Ricky Christopher. Of note, she had a post-surgical drain placed, and further adds pulling on the draining tube which caused part of the tubing to come out of her neck. She notes trying to put drain tube back in her neck, however does not know if she placed it back in properly. There are no other denying factors at this time. Social Hx:  ETOH: no  Tobacco: no  Illicit drug use: no    PCP: Hansa Pritchett MD   Ortho: Megha Og MD    There are no other complaints, changes, or physical findings at this time.     Current Outpatient Prescriptions   Medication Sig Dispense Refill    oxyCODONE IR (ROXICODONE) 5 mg immediate release tablet Take 1 Tab by mouth every four (4) hours as needed for Pain. Max Daily Amount: 30 mg. 80 Tab 0    trimethoprim-sulfamethoxazole (BACTRIM)  mg per tablet Take 1 Tab by mouth daily.  metroNIDAZOLE (FLAGYL) 500 mg tablet Take 1 Tab by mouth three (3) times daily as needed for Diarrhea or Other. 20 Tab 0    cyanocobalamin (VITAMIN B12) 1,000 mcg/mL injection 1,000 mcg by IntraMUSCular route every twenty-eight (28) days.  traZODone (DESYREL) 100 mg tablet Take 100 mg by mouth nightly as needed for Sleep.  FLUoxetine (PROZAC) 40 mg capsule Take 40 mg by mouth daily.  ASPIRIN/SALICYLAMIDE/CAFFEINE (BC HEADACHE POWDER PO) Take 1 Packet by mouth daily as needed (headache).  nebivolol (BYSTOLIC) 5 mg tablet Take 5 mg by mouth daily as needed ('rapid heart beat').  levothyroxine (SYNTHROID) 50 mcg tablet Take 50 mcg by mouth Daily (before breakfast).  ergocalciferol (VITAMIN D2) 50,000 unit capsule Take 50,000 Units by mouth two (2) times a week.  'On  and  or .'         Past History     Past Medical History:  Past Medical History:   Diagnosis Date    Anemia     Anxiety     Arrhythmia     tachy with exertion; HAS PRN MEDS FOR THIS    Arthritis     osteoarthritis    Autoimmune disease ([facility] [address]     sarcoidosis    Bowel trouble     Burning with urination     Chronic pain     Connective tissue disorder ([facility] [address]     Depression     NOT SEVERE    Dyspepsia and other specified disorders of function of stomach     Headache     Hypoglycemia     Hypothyroid     Osteoporosis     Prolapse of female genital organs     Sarcoidosis        Past Surgical History:  Past Surgical History:   Procedure Laterality Date    HX BLADDER SUSPENSION      HX CATARACT REMOVAL      HX  SECTION      X2    HX GASTRIC BYPASS  1999    HX HERNIA REPAIR      HX HYSTERECTOMY      HX OTHER SURGICAL      TUMMY ROSALIOCK    NEUROLOGICAL PROCEDURE UNLISTED  2003, 2004    back surgery        Family History:  Family History   Problem Relation Age of Onset    Hypertension Father     Diabetes Father     Cancer Father      SKIN, \"MOLE\", AND A CANCER OVER EYE    Diabetes Paternal Aunt     Heart Disease Maternal Grandmother     Stroke Maternal Grandmother     Anemia Mother     No Known Problems Brother     Anesth Problems Other      \"REALLY BAD REACTION, I DON'T REMEMBER WHAT HAPPENED\"    No Known Problems Son     No Known Problems Daughter     No Known Problems Daughter        Social History:  Social History   Substance Use Topics    Smoking status: Never Smoker    Smokeless tobacco: Never Used    Alcohol use No       Allergies:  No Known Allergies    Review of Systems   Review of Systems   Constitutional: Positive for fever. Negative for chills. HENT: Negative for congestion and sore throat. Eyes: Negative for visual disturbance. Respiratory: Negative for cough and shortness of breath. Cardiovascular: Negative for chest pain and leg swelling. Gastrointestinal: Positive for nausea. Negative for abdominal pain, blood in stool and diarrhea. Endocrine: Negative for polyuria. Genitourinary: Negative for dysuria, flank pain, vaginal bleeding and vaginal discharge. Musculoskeletal: Positive for neck pain. Negative for myalgias. Skin: Negative for rash. Allergic/Immunologic: Negative for immunocompromised state. Neurological: Negative for weakness and headaches. Psychiatric/Behavioral: Negative for confusion. Physical Exam   Physical Exam   Constitutional: She is oriented to person, place, and time. She appears well-developed and well-nourished. HENT:   Head: Normocephalic and atraumatic. Moist mucous membranes   Eyes: Conjunctivae are normal. Pupils are equal, round, and reactive to light. Right eye exhibits no discharge. Left eye exhibits no discharge.    Neck: Normal range of motion. Neck supple. No tracheal deviation present. Surgical wound to the L neck superior to the clavicle with CIRILO drain in place draining serous fluid. Cardiovascular: Normal rate, regular rhythm and normal heart sounds. No murmur heard. Pulmonary/Chest: Effort normal and breath sounds normal. No respiratory distress. She has no wheezes. She has no rales. Abdominal: Soft. Bowel sounds are normal. There is no tenderness. There is no rebound and no guarding. Musculoskeletal: Normal range of motion. She exhibits no edema, tenderness or deformity. Neurological: She is alert and oriented to person, place, and time. Skin: Skin is warm and dry. No rash noted. No erythema. Psychiatric: Her behavior is normal.   Nursing note and vitals reviewed. Diagnostic Study Results     Labs -   Recent Results (from the past 12 hour(s))   METABOLIC PANEL, BASIC    Collection Time: 04/23/18  4:39 PM   Result Value Ref Range    Sodium 140 136 - 145 mmol/L    Potassium 3.9 3.5 - 5.1 mmol/L    Chloride 108 97 - 108 mmol/L    CO2 25 21 - 32 mmol/L    Anion gap 7 5 - 15 mmol/L    Glucose 79 65 - 100 mg/dL    BUN 19 6 - 20 MG/DL    Creatinine 0.52 (L) 0.55 - 1.02 MG/DL    BUN/Creatinine ratio 37 (H) 12 - 20      GFR est AA >60 >60 ml/min/1.73m2    GFR est non-AA >60 >60 ml/min/1.73m2    Calcium 8.3 (L) 8.5 - 10.1 MG/DL   CBC WITH AUTOMATED DIFF    Collection Time: 04/23/18  4:39 PM   Result Value Ref Range    WBC 5.5 3.6 - 11.0 K/uL    RBC 4.34 3.80 - 5.20 M/uL    HGB 13.6 11.5 - 16.0 g/dL    HCT 42.8 35.0 - 47.0 %    MCV 98.6 80.0 - 99.0 FL    MCH 31.3 26.0 - 34.0 PG    MCHC 31.8 30.0 - 36.5 g/dL    RDW 11.9 11.5 - 14.5 %    PLATELET 862 595 - 586 K/uL    MPV 9.8 8.9 - 12.9 FL    NRBC 0.0 0  WBC    ABSOLUTE NRBC 0.00 0.00 - 0.01 K/uL    NEUTROPHILS 68 32 - 75 %    LYMPHOCYTES 23 12 - 49 %    MONOCYTES 7 5 - 13 %    EOSINOPHILS 2 0 - 7 %    BASOPHILS 0 0 - 1 %    IMMATURE GRANULOCYTES 0 0.0 - 0.5 %    ABS. NEUTROPHILS 3.7 1.8 - 8.0 K/UL    ABS. LYMPHOCYTES 1.2 0.8 - 3.5 K/UL    ABS. MONOCYTES 0.4 0.0 - 1.0 K/UL    ABS. EOSINOPHILS 0.1 0.0 - 0.4 K/UL    ABS. BASOPHILS 0.0 0.0 - 0.1 K/UL    ABS. IMM. GRANS. 0.0 0.00 - 0.04 K/UL    DF AUTOMATED     LACTIC ACID    Collection Time: 04/23/18  4:40 PM   Result Value Ref Range    Lactic acid 0.5 0.4 - 2.0 MMOL/L   URINALYSIS W/ REFLEX CULTURE    Collection Time: 04/23/18  5:49 PM   Result Value Ref Range    Color YELLOW/STRAW      Appearance CLEAR CLEAR      Specific gravity 1.015 1.003 - 1.030      pH (UA) 6.0 5.0 - 8.0      Protein NEGATIVE  NEG mg/dL    Glucose NEGATIVE  NEG mg/dL    Ketone 40 (A) NEG mg/dL    Blood NEGATIVE  NEG      Urobilinogen 1.0 0.2 - 1.0 EU/dL    Nitrites NEGATIVE  NEG      Leukocyte Esterase NEGATIVE  NEG      UA:UC IF INDICATED CULTURE NOT INDICATED BY UA RESULT CNI      WBC 0-4 0 - 4 /hpf    RBC 0-5 0 - 5 /hpf    Epithelial cells FEW FEW /lpf    Bacteria NEGATIVE  NEG /hpf    Hyaline cast 0-2 0 - 5 /lpf   BILIRUBIN, CONFIRM    Collection Time: 04/23/18  5:49 PM   Result Value Ref Range    Bilirubin UA, confirm NEGATIVE  NEG         Radiologic Studies -   CT SPINE CERV W CONT   Final Result   IMPRESSION: C5-C6 anterior cervical disc fusion. No acute abnormality. EXAM:  CT CERVICAL SPINE WITHOUT CONTRAST  INDICATION: Neck pain, cervical spine, recent cervical fusion, pain and  dysphagia. COMPARISON: 5/10/2017. CONTRAST: 100 cc [id number].     TECHNIQUE:   Multislice helical CT of the cervical spine was performed during uneventful  rapid bolus intravenous contrast administration. Sagittal and coronal  reformations were generated. CT dose reduction was achieved through use of a  standardized protocol tailored for this examination and automatic exposure  control for dose modulation.     FINDINGS:  The alignment of the cervical spine is normal. There are degenerative changes at  C1-C2 articulation. The odontoid process is intact.  The craniocervical junction  is normal. The prevertebral soft tissues are normal.     The findings at each level are as follows:      C2-C3: No abnormality. There is no spinal canal or neural foraminal stenosis. C3-C4: No abnormality. There is no spinal canal or neural foraminal stenosis. C4-C5: Disc space narrowing. There is no spinal canal or neural foraminal  stenosis. C5-C6: Anterior cervical fusion with interbody bone graft and plate and screws. The position of hardware and alignment is satisfactory. There is no spinal canal  or neural foraminal stenosis. C6-C7: No abnormality. There is no spinal canal or neural foraminal stenosis. C7-T1: No abnormality. There is no spinal canal or neural foraminal stenosis.     IMPRESSION  IMPRESSION: C5-C6 anterior cervical disc fusion. No acute abnormality. Medical Decision Making   I am the first provider for this patient. I reviewed the vital signs, available nursing notes, past medical history, past surgical history, family history and social history. Vital Signs-Reviewed the patient's vital signs. Patient Vitals for the past 12 hrs:   Temp Pulse Resp BP SpO2   04/23/18 1844 - - - 115/78 94 %   04/23/18 1822 - - - - 94 %   04/23/18 1800 - - - 115/76 96 %   04/23/18 1700 - - - 112/75 94 %   04/23/18 1644 - - - 128/84 -   04/23/18 1444 98.7 °F (37.1 °C) (!) 105 16 (!) 140/96 100 %     Records Reviewed: Old Medical Records    Provider Notes (Medical Decision Making):     DDx: post-operative pain, abscess, muscle injury secondary to surgery, carotid dissection less likely. Discussed with radiology, ct shows no obvious injury to carotids, no abscess, no soft tissue fluid collection. Patient safe for discharge, will follow up orthopedic surgeon. ED Course:   Initial assessment performed. The patients presenting problems have been discussed, and they are in agreement with the care plan formulated and outlined with them.   I have encouraged them to ask questions as they arise throughout their visit. Disposition:  DISCHARGE NOTE  6:22 PM  The patient has been re-evaluated and is ready for discharge. Reviewed available results with patient. Counseled pt on diagnosis and care plan. Pt has expressed understanding, and all questions have been answered. Pt agrees with plan and agrees to F/U as recommended, or return to the ED if their sxs worsen. Discharge instructions have been provided and explained to the pt, along with reasons to return to the ED. PLAN:  1. Discharge Medication List as of 4/23/2018  6:22 PM        2. Follow-up Information     Follow up With Details Comments [id number] Keaton Chanel MD Call in 1 day  [address] [facility] [address]  [phone]      [facility] EMERGENCY DEPT  If symptoms worsen [address] [place] [address]  [phone]        Return to ED if worse     Diagnosis     Clinical Impression:   1. Neck pain    2. Facial pain        Attestations: This note is prepared by Marilyn Duffy, acting as Scribe for Kirill Smart DO. The scribe's documentation has been prepared under my direction and personally reviewed by me in its entirety. I confirm that the note above accurately reflects all work, treatment, procedures, and medical decision making performed by me.   Kirill Smart DO

## 2018-04-23 NOTE — ED NOTES
Dr Ariadna Ayala has reviewed discharge instructions with the patient. The patient verbalized understanding. Pt. A&Ox4, respirations even and unlabored. VS stable as noted in flowsheet. Pt Declined wheelchair assist from department; paperwork in hand.

## 2018-06-13 ENCOUNTER — HOSPITAL ENCOUNTER (OUTPATIENT)
Dept: ULTRASOUND IMAGING | Age: 48
Discharge: HOME | End: 2018-06-13
Attending: FAMILY MEDICINE
Payer: COMMERCIAL

## 2018-06-13 DIAGNOSIS — K76.9 LIVER DISEASE: ICD-10-CM

## 2018-06-13 PROCEDURE — 76700 US EXAM ABDOM COMPLETE: CPT

## 2019-02-08 ENCOUNTER — HOSPITAL ENCOUNTER (EMERGENCY)
Age: 49
Discharge: HOME | End: 2019-02-08
Attending: EMERGENCY MEDICINE
Payer: COMMERCIAL

## 2019-02-08 VITALS
DIASTOLIC BLOOD PRESSURE: 80 MMHG | RESPIRATION RATE: 13 BRPM | HEART RATE: 99 BPM | OXYGEN SATURATION: 93 % | TEMPERATURE: 98 F | SYSTOLIC BLOOD PRESSURE: 139 MMHG

## 2019-02-08 DIAGNOSIS — F10.920 ALCOHOLIC INTOXICATION WITHOUT COMPLICATION (HCC): Primary | ICD-10-CM

## 2019-02-08 LAB
ALBUMIN SERPL-MCNC: 4 G/DL (ref 3.5–5)
ALBUMIN/GLOB SERPL: 1.2 {RATIO} (ref 1.1–2.2)
ALP SERPL-CCNC: 92 U/L (ref 45–117)
ALT SERPL-CCNC: 52 U/L (ref 12–78)
AMPHET UR QL SCN: NEGATIVE
ANION GAP SERPL CALC-SCNC: 8 MMOL/L (ref 5–15)
APAP SERPL-MCNC: <2 UG/ML (ref 10–30)
AST SERPL-CCNC: 36 U/L (ref 15–37)
BARBITURATES UR QL SCN: POSITIVE
BASOPHILS # BLD: 0 K/UL (ref 0–0.1)
BASOPHILS NFR BLD: 0 % (ref 0–1)
BENZODIAZ UR QL: NEGATIVE
BILIRUB SERPL-MCNC: <0.1 MG/DL (ref 0.2–1)
BUN SERPL-MCNC: 14 MG/DL (ref 6–20)
BUN/CREAT SERPL: 17 (ref 12–20)
CALCIUM SERPL-MCNC: 8.2 MG/DL (ref 8.5–10.1)
CANNABINOIDS UR QL SCN: NEGATIVE
CHLORIDE SERPL-SCNC: 113 MMOL/L (ref 97–108)
CO2 SERPL-SCNC: 22 MMOL/L (ref 21–32)
COCAINE UR QL SCN: NEGATIVE
CREAT SERPL-MCNC: 0.82 MG/DL (ref 0.55–1.02)
DIFFERENTIAL METHOD BLD: NORMAL
DRUG SCRN COMMENT,DRGCM: ABNORMAL
EOSINOPHIL # BLD: 0.2 K/UL (ref 0–0.4)
EOSINOPHIL NFR BLD: 4 % (ref 0–7)
ERYTHROCYTE [DISTWIDTH] IN BLOOD BY AUTOMATED COUNT: 12.7 % (ref 11.5–14.5)
ETHANOL SERPL-MCNC: 255 MG/DL
GLOBULIN SER CALC-MCNC: 3.4 G/DL (ref 2–4)
GLUCOSE BLD STRIP.AUTO-MCNC: 117 MG/DL (ref 65–100)
GLUCOSE SERPL-MCNC: 107 MG/DL (ref 65–100)
HCT VFR BLD AUTO: 43.8 % (ref 35–47)
HGB BLD-MCNC: 14.3 G/DL (ref 11.5–16)
IMM GRANULOCYTES # BLD AUTO: 0 K/UL (ref 0–0.04)
IMM GRANULOCYTES NFR BLD AUTO: 0 % (ref 0–0.5)
LYMPHOCYTES # BLD: 2.2 K/UL (ref 0.8–3.5)
LYMPHOCYTES NFR BLD: 39 % (ref 12–49)
MCH RBC QN AUTO: 31 PG (ref 26–34)
MCHC RBC AUTO-ENTMCNC: 32.6 G/DL (ref 30–36.5)
MCV RBC AUTO: 94.8 FL (ref 80–99)
METHADONE UR QL: NEGATIVE
MONOCYTES # BLD: 0.3 K/UL (ref 0–1)
MONOCYTES NFR BLD: 6 % (ref 5–13)
NEUTS SEG # BLD: 2.9 K/UL (ref 1.8–8)
NEUTS SEG NFR BLD: 51 % (ref 32–75)
NRBC # BLD: 0 K/UL (ref 0–0.01)
NRBC BLD-RTO: 0 PER 100 WBC
OPIATES UR QL: NEGATIVE
PCP UR QL: NEGATIVE
PLATELET # BLD AUTO: 179 K/UL (ref 150–400)
PMV BLD AUTO: 10 FL (ref 8.9–12.9)
POTASSIUM SERPL-SCNC: 3.7 MMOL/L (ref 3.5–5.1)
PROT SERPL-MCNC: 7.4 G/DL (ref 6.4–8.2)
RBC # BLD AUTO: 4.62 M/UL (ref 3.8–5.2)
SALICYLATES SERPL-MCNC: 12.8 MG/DL (ref 2.8–20)
SERVICE CMNT-IMP: ABNORMAL
SODIUM SERPL-SCNC: 143 MMOL/L (ref 136–145)
WBC # BLD AUTO: 5.7 K/UL (ref 3.6–11)

## 2019-02-08 PROCEDURE — 80307 DRUG TEST PRSMV CHEM ANLYZR: CPT

## 2019-02-08 PROCEDURE — 85025 COMPLETE CBC W/AUTO DIFF WBC: CPT

## 2019-02-08 PROCEDURE — 80053 COMPREHEN METABOLIC PANEL: CPT

## 2019-02-08 PROCEDURE — 82962 GLUCOSE BLOOD TEST: CPT

## 2019-02-08 PROCEDURE — 99284 EMERGENCY DEPT VISIT MOD MDM: CPT

## 2019-02-08 PROCEDURE — 36415 COLL VENOUS BLD VENIPUNCTURE: CPT

## 2019-02-08 RX ORDER — PEPPERMINT OIL
SPIRIT ORAL ONCE
Status: DISCONTINUED | OUTPATIENT
Start: 2019-02-08 | End: 2019-02-09 | Stop reason: HOSPADM

## 2019-02-08 NOTE — ED PROVIDER NOTES
EMERGENCY DEPARTMENT HISTORY AND PHYSICAL EXAM 
     
 
Date: 2/8/2019 Patient Name: Claudette Wolf History of Presenting Illness Chief Complaint Patient presents with  Altered mental status History Provided By: EMS 
 
HPI: Claudette Wolf is a 50 y.o. female, pmhx anxiety, chronic pain, sarcoidosis, hypoglycemia, depression, who presents via EMS to the ED for evaluation of AMS. Per EMS, pt's  came home and found her slumped over a chair and passed out. EMS reports hx of alcohol abuse and several visits to rehab. Her  stated pt was currently 70 days sober and does not believe she was drinking today. EMS reports pt denied any alcohol use today. Per EMS, pt has a hx of taking excessive narcotics, was taken off by her doctors, and then she turned to alcohol. Her  stated pt has a hx of hypoglycemia but she is not on any insulin. Her BS was noted to be 69 by EMS and she was given D10. Pt denied any SI to EMS. Upon examination pt states she \"is trying to relax. \" She denies any N, abdominal pain, CP, or SOB. She denies any recent injuries or head trauma. She denies any narcotic use. Hx and ROS otherwise limited given AMS. PCP: Murray Hilliard MD 
 
Allergies: NKDA Social Hx: -tobacco, -EtOH (former), -Illicit Drugs Current Facility-Administered Medications Medication Dose Route Frequency Provider Last Rate Last Dose  peppermint spirit solution   Other ONCE Subhash De Luna MD      
 sodium chloride 0.9 % bolus infusion 1,000 mL  1,000 mL IntraVENous ONCE MarkrSubhash MD      
 
Current Outpatient Medications Medication Sig Dispense Refill  oxyCODONE IR (ROXICODONE) 5 mg immediate release tablet Take 1 Tab by mouth every four (4) hours as needed for Pain. Max Daily Amount: 30 mg. 80 Tab 0  
 trimethoprim-sulfamethoxazole (BACTRIM)  mg per tablet Take 1 Tab by mouth daily.  metroNIDAZOLE (FLAGYL) 500 mg tablet Take 1 Tab by mouth three (3) times daily as needed for Diarrhea or Other. 20 Tab 0  
 cyanocobalamin (VITAMIN B12) 1,000 mcg/mL injection 1,000 mcg by IntraMUSCular route every twenty-eight (28) days.  traZODone (DESYREL) 100 mg tablet Take 100 mg by mouth nightly as needed for Sleep.  FLUoxetine (PROZAC) 40 mg capsule Take 40 mg by mouth daily.  ASPIRIN/SALICYLAMIDE/CAFFEINE (BC HEADACHE POWDER PO) Take 1 Packet by mouth daily as needed (headache).  nebivolol (BYSTOLIC) 5 mg tablet Take 5 mg by mouth daily as needed ('rapid heart beat').  levothyroxine (SYNTHROID) 50 mcg tablet Take 50 mcg by mouth Daily (before breakfast).  ergocalciferol (VITAMIN D2) 50,000 unit capsule Take 50,000 Units by mouth two (2) times a week. 'On  and  or .' Past History Past Medical History: 
Past Medical History:  
Diagnosis Date  Anemia  Anxiety  Arrhythmia   
 tachy with exertion; HAS PRN MEDS FOR THIS  
 Arthritis   
 osteoarthritis  Autoimmune disease ([address]   
 sarcoidosis  Bowel trouble  Burning with urination  Chronic pain  Connective tissue disorder ([address]  Depression NOT SEVERE  Dyspepsia and other specified disorders of function of stomach   
 Headache  Hypoglycemia  Hypothyroid  Osteoporosis  Prolapse of female genital organs  Sarcoidosis Past Surgical History: 
Past Surgical History:  
Procedure Laterality Date  HX BLADDER SUSPENSION    
 HX CATARACT REMOVAL    HX  SECTION X2  
 HX GASTRIC BYPASS  1999  HX HERNIA REPAIR    
 HX HYSTERECTOMY    HX OTHER SURGICAL    
 TUMMY TUCK  
 NEUROLOGICAL PROCEDURE UNLISTED  ,   
 back surgery Family History: 
Family History Problem Relation Age of Onset  Hypertension Father  Diabetes Father  Cancer Father SKIN, \"MOLE\", AND A CANCER OVER EYE  Diabetes Paternal Aunt  Heart Disease Maternal Grandmother  Stroke Maternal Grandmother  Anemia Mother  No Known Problems Brother  Anesth Problems Other \"REALLY BAD REACTION, I DON'T REMEMBER WHAT HAPPENED\"  No Known Problems Son  No Known Problems Daughter  No Known Problems Daughter Social History: 
Social History Tobacco Use  Smoking status: Never Smoker  Smokeless tobacco: Never Used Substance Use Topics  Alcohol use: No  
  Alcohol/week: 0.6 oz Types: 1 Glasses of wine per week  Drug use: No  
 
 
Allergies: 
No Known Allergies Review of Systems Review of Systems Unable to perform ROS: Mental status change Constitutional: Negative for activity change, appetite change, chills, fever and unexpected weight change. HENT: Negative for congestion. Eyes: Negative for pain and visual disturbance. Respiratory: Negative for cough and shortness of breath. Cardiovascular: Negative for chest pain. Gastrointestinal: Negative for abdominal pain, diarrhea, nausea and vomiting. Genitourinary: Negative for dysuria. Musculoskeletal: Negative for back pain. Skin: Negative for rash. Neurological: Negative for headaches. Physical Exam  
Physical Exam  
Constitutional: She is oriented to person, place, and time. She appears well-developed and well-nourished. Intoxicated middle aged female with slurred speech HENT:  
Head: Normocephalic and atraumatic. Dry MM Eyes: Conjunctivae and EOM are normal. Pupils are equal, round, and reactive to light. Right eye exhibits no discharge. Left eye exhibits no discharge. Neck: Normal range of motion. Neck supple. Cardiovascular: Regular rhythm and normal heart sounds. No murmur heard. Borderline tachycardia Pulmonary/Chest: Effort normal and breath sounds normal. No respiratory distress. She has no wheezes. She has no rales. She exhibits no tenderness. Abdominal: Soft. Bowel sounds are normal. She exhibits no distension and no mass. There is no tenderness. There is no rebound and no guarding. Musculoskeletal: Normal range of motion. She exhibits no edema, tenderness or deformity. Neurological: She is alert and oriented to person, place, and time. No cranial nerve deficit. She exhibits normal muscle tone. Skin: Skin is warm and dry. No rash noted. She is not diaphoretic. Nursing note and vitals reviewed. Diagnostic Study Results Labs - Recent Results (from the past 12 hour(s)) GLUCOSE, POC Collection Time: 02/08/19  5:34 PM  
Result Value Ref Range Glucose (POC) 117 (H) 65 - 100 mg/dL Performed by [employer] (PCT) CBC WITH AUTOMATED DIFF Collection Time: 02/08/19  5:45 PM  
Result Value Ref Range WBC 5.7 3.6 - 11.0 K/uL  
 RBC 4.62 3.80 - 5.20 M/uL  
 HGB 14.3 11.5 - 16.0 g/dL HCT 43.8 35.0 - 47.0 % MCV 94.8 80.0 - 99.0 FL  
 MCH 31.0 26.0 - 34.0 PG  
 MCHC 32.6 30.0 - 36.5 g/dL  
 RDW 12.7 11.5 - 14.5 % PLATELET 944 909 - 414 K/uL MPV 10.0 8.9 - 12.9 FL  
 NRBC 0.0 0  WBC ABSOLUTE NRBC 0.00 0.00 - 0.01 K/uL NEUTROPHILS 51 32 - 75 % LYMPHOCYTES 39 12 - 49 % MONOCYTES 6 5 - 13 % EOSINOPHILS 4 0 - 7 % BASOPHILS 0 0 - 1 % IMMATURE GRANULOCYTES 0 0.0 - 0.5 % ABS. NEUTROPHILS 2.9 1.8 - 8.0 K/UL  
 ABS. LYMPHOCYTES 2.2 0.8 - 3.5 K/UL  
 ABS. MONOCYTES 0.3 0.0 - 1.0 K/UL  
 ABS. EOSINOPHILS 0.2 0.0 - 0.4 K/UL  
 ABS. BASOPHILS 0.0 0.0 - 0.1 K/UL  
 ABS. IMM. GRANS. 0.0 0.00 - 0.04 K/UL  
 DF AUTOMATED METABOLIC PANEL, COMPREHENSIVE Collection Time: 02/08/19  5:45 PM  
Result Value Ref Range Sodium 143 136 - 145 mmol/L Potassium 3.7 3.5 - 5.1 mmol/L Chloride 113 (H) 97 - 108 mmol/L  
 CO2 22 21 - 32 mmol/L Anion gap 8 5 - 15 mmol/L Glucose 107 (H) 65 - 100 mg/dL  BUN 14 6 - 20 MG/DL  
 Creatinine 0.82 0.55 - 1.02 MG/DL  
 BUN/Creatinine ratio 17 12 - 20 GFR est AA >60 >60 ml/min/1.73m2 GFR est non-AA >60 >60 ml/min/1.73m2 Calcium 8.2 (L) 8.5 - 10.1 MG/DL Bilirubin, total <0.1 (L) 0.2 - 1.0 MG/DL  
 ALT (SGPT) 52 12 - 78 U/L  
 AST (SGOT) 36 15 - 37 U/L Alk. phosphatase 92 45 - 117 U/L Protein, total 7.4 6.4 - 8.2 g/dL Albumin 4.0 3.5 - 5.0 g/dL Globulin 3.4 2.0 - 4.0 g/dL A-G Ratio 1.2 1.1 - 2.2 ETHYL ALCOHOL Collection Time: 02/08/19  5:45 PM  
Result Value Ref Range ALCOHOL(ETHYL),SERUM 255 (H) <10 MG/DL  
DRUG SCREEN, URINE Collection Time: 02/08/19  5:45 PM  
Result Value Ref Range AMPHETAMINES NEGATIVE  NEG    
 BARBITURATES POSITIVE (A) NEG BENZODIAZEPINES NEGATIVE  NEG    
 COCAINE NEGATIVE  NEG METHADONE NEGATIVE  NEG    
 OPIATES NEGATIVE  NEG    
 PCP(PHENCYCLIDINE) NEGATIVE  NEG    
 THC (TH-CANNABINOL) NEGATIVE  NEG Drug screen comment (NOTE) ACETAMINOPHEN Collection Time: 02/08/19  5:45 PM  
Result Value Ref Range Acetaminophen level <2 (L) 10 - 30 ug/mL SALICYLATE Collection Time: 02/08/19  5:45 PM  
Result Value Ref Range Salicylate level 95.4 2.8 - 20.0 MG/DL Radiologic Studies - No orders to display CT Results  (Last 48 hours) None CXR Results  (Last 48 hours) None Medical Decision Making I am the first provider for this patient. I reviewed the vital signs, available nursing notes, past medical history, past surgical history, family history and social history. Vital Signs-Reviewed the patient's vital signs. Patient Vitals for the past 12 hrs: 
 Temp Pulse Resp BP SpO2  
02/08/19 1745  99 13 139/80 93 % 02/08/19 1731 98 °F (36.7 °C) (!) 103 14  98 % 02/08/19 1730    133/82  Pulse Oximetry Analysis - 98% on RA Records Reviewed: Nursing Notes, Old Medical Records, Ambulance Run Sheet, Previous Radiology Studies and Previous Laboratory Studies Provider Notes (Medical Decision Making): Acute intoxication without overt evidence of trauma. Patient able to answer questioning after PD left room. Denies ROS, assault, trauma. ED Course:  
Initial assessment performed. PROGRESS NOTE: 
6:30 Patient medically cleared, remains awake and alert. IVF infusion with discharge when able to ambulate safely. PROGRESS NOTE: 
7:10 PM 
Nursing staff spoke with . He will not let her come back home tonight since she was found by her children passed out and drunk. He states pt does not have any friends because they have all distanced themselves from her. She will be discharged once we find a place she can be dispositioned to. PROGRESS NOTE: 
19:30 Patient pulled out her IV and got dressed, walking down the hallway normally (without shoes). Request RN to call spouse to bring her clothing and money and phone so she can find a place to go tonight. Critical Care Time:  
0 Diagnosis Clinical Impression: 1. Alcoholic intoxication without complication ([address] 2. Alcoholism /alcohol abuse ([address] PLAN: 
1. Current Discharge Medication List  
  
 
2. Follow-up Information Follow up With Specialties Details Why Contact Info [facility] EMERGENCY DEPT Emergency Medicine  If symptoms worsen [address] [facility] 
[phone] Day Roman MD Family Practice  As needed [facility] [address]. [phone] Return to ED if worse Disposition: 
 
Discharge Note: 
19:30 The patient has been re-evaluated and is ready for discharge. Reviewed available results with patient. Counseled patient on diagnosis and care plan. Patient has expressed understanding, and all questions have been answered.  Patient agrees with plan and agrees to follow up as recommended, or to return to the ED if their symptoms worsen. Discharge instructions have been provided and explained to the patient, along with reasons to return to the ED. Attestations: This note is prepared by Duc Shrestha, acting as Scribe for MD Aquilino Kay MD : The scribe's documentation has been prepared under my direction and personally reviewed by me in its entirety. I confirm that the note above accurately reflects all work, treatment, procedures, and medical decision making performed by me. This note will not be viewable in [address]

## 2019-02-09 NOTE — ED NOTES
This RN spoke with Charge RN about plan to get patient discharged and home d/t some social issues. Pt's  does not want her back in the house. Charge RN will call the patient a cab and spoke with Carlos Enrique [employer] about having an officer meet her at the house.

## 2019-02-09 NOTE — DISCHARGE INSTRUCTIONS
Patient Education        Learning About Alcohol Misuse  What is alcohol misuse? Alcohol misuse means drinking so much that it causes problems for you or others. Early problems with alcohol can start at home. You may argue with loved ones about how much you're drinking. Your job may be affected because of drinking. You may drink when it's dangerous or illegal, such as when you drive. Drinking too much for a long time can lead to health conditions like high blood pressure and liver problems. What are the symptoms? Symptoms of alcohol misuse may include:  · Drinking much more than you planned. · Drinking even though it's causing problems for you or others. · Putting yourself in situations where you might get hurt. · Wanting to cut down or stop drinking, but not being able to. · Feeling guilty about how much you're drinking. How is alcohol misuse treated? Getting help for problems with alcohol is up to you. But you don't have to do it alone. There are many people and kinds of treatments to help with alcohol problems. Talking to your doctor is the first step. When you get a doctor's help, treatment for alcohol problems can be safer and quicker. Treatment options can include:  · Treatment programs. Examples are group therapy, one or more types of counseling, and alcohol education. · Medicines. A doctor or counselor can help you know what kinds of medicines might help with cravings. · Free social support groups. These groups include AA (Alcoholics Anonymous) and SMART (Self-Management and Recovery Training). Your doctor can help you decide which type of program is best for you. Follow-up care is a key part of your treatment and safety. Be sure to make and go to all appointments, and call your doctor if you are having problems. It's also a good idea to know your test results and keep a list of the medicines you take. Where can you learn more? Go to http://praful-monserrat.info/.   Enter H758 in the search box to learn more about \"Learning About Alcohol Misuse. \"  Current as of: May 7, 2018  Content Version: 11.9  © 4558-1733 [employer]. Care instructions adapted under license by [employer] (which disclaims liability or warranty for this information). If you have questions about a medical condition or this instruction, always ask your healthcare professional. [place] [address] any warranty or liability for your use of this information.

## 2019-03-07 ENCOUNTER — APPOINTMENT (OUTPATIENT)
Dept: ULTRASOUND IMAGING | Age: 49
End: 2019-03-07
Attending: EMERGENCY MEDICINE
Payer: COMMERCIAL

## 2019-03-07 ENCOUNTER — HOSPITAL ENCOUNTER (EMERGENCY)
Age: 49
Discharge: HOME | End: 2019-03-07
Attending: EMERGENCY MEDICINE
Payer: COMMERCIAL

## 2019-03-07 VITALS
HEART RATE: 97 BPM | RESPIRATION RATE: 17 BRPM | DIASTOLIC BLOOD PRESSURE: 71 MMHG | OXYGEN SATURATION: 96 % | HEIGHT: 64 IN | WEIGHT: 197.53 LBS | SYSTOLIC BLOOD PRESSURE: 120 MMHG | BODY MASS INDEX: 33.72 KG/M2 | TEMPERATURE: 98.3 F

## 2019-03-07 DIAGNOSIS — M54.50 ACUTE BILATERAL LOW BACK PAIN WITHOUT SCIATICA: ICD-10-CM

## 2019-03-07 DIAGNOSIS — N39.0 URINARY TRACT INFECTION WITHOUT HEMATURIA, SITE UNSPECIFIED: Primary | ICD-10-CM

## 2019-03-07 LAB
ALBUMIN SERPL-MCNC: 4.2 G/DL (ref 3.5–5)
ALBUMIN/GLOB SERPL: 1.3 {RATIO} (ref 1.1–2.2)
ALP SERPL-CCNC: 101 U/L (ref 45–117)
ALT SERPL-CCNC: 32 U/L (ref 12–78)
ANION GAP SERPL CALC-SCNC: 6 MMOL/L (ref 5–15)
APPEARANCE UR: CLEAR
AST SERPL-CCNC: 28 U/L (ref 15–37)
BACTERIA URNS QL MICRO: ABNORMAL /HPF
BASOPHILS # BLD: 0 K/UL (ref 0–0.1)
BASOPHILS NFR BLD: 0 % (ref 0–1)
BILIRUB SERPL-MCNC: 0.2 MG/DL (ref 0.2–1)
BILIRUB UR QL: NEGATIVE
BUN SERPL-MCNC: 16 MG/DL (ref 6–20)
BUN/CREAT SERPL: 29 (ref 12–20)
CALCIUM SERPL-MCNC: 8.6 MG/DL (ref 8.5–10.1)
CHLORIDE SERPL-SCNC: 108 MMOL/L (ref 97–108)
CO2 SERPL-SCNC: 25 MMOL/L (ref 21–32)
COLOR UR: ABNORMAL
CREAT SERPL-MCNC: 0.56 MG/DL (ref 0.55–1.02)
DIFFERENTIAL METHOD BLD: NORMAL
EOSINOPHIL # BLD: 0.1 K/UL (ref 0–0.4)
EOSINOPHIL NFR BLD: 2 % (ref 0–7)
EPITH CASTS URNS QL MICRO: ABNORMAL /LPF
ERYTHROCYTE [DISTWIDTH] IN BLOOD BY AUTOMATED COUNT: 12.7 % (ref 11.5–14.5)
GLOBULIN SER CALC-MCNC: 3.3 G/DL (ref 2–4)
GLUCOSE SERPL-MCNC: 89 MG/DL (ref 65–100)
GLUCOSE UR STRIP.AUTO-MCNC: NEGATIVE MG/DL
HCT VFR BLD AUTO: 44.8 % (ref 35–47)
HGB BLD-MCNC: 14.4 G/DL (ref 11.5–16)
HGB UR QL STRIP: NEGATIVE
HYALINE CASTS URNS QL MICRO: ABNORMAL /LPF (ref 0–5)
IMM GRANULOCYTES # BLD AUTO: 0 K/UL (ref 0–0.04)
IMM GRANULOCYTES NFR BLD AUTO: 0 % (ref 0–0.5)
KETONES UR QL STRIP.AUTO: NEGATIVE MG/DL
LEUKOCYTE ESTERASE UR QL STRIP.AUTO: NEGATIVE
LYMPHOCYTES # BLD: 1.8 K/UL (ref 0.8–3.5)
LYMPHOCYTES NFR BLD: 35 % (ref 12–49)
MCH RBC QN AUTO: 30.1 PG (ref 26–34)
MCHC RBC AUTO-ENTMCNC: 32.1 G/DL (ref 30–36.5)
MCV RBC AUTO: 93.5 FL (ref 80–99)
MONOCYTES # BLD: 0.4 K/UL (ref 0–1)
MONOCYTES NFR BLD: 8 % (ref 5–13)
NEUTS SEG # BLD: 2.7 K/UL (ref 1.8–8)
NEUTS SEG NFR BLD: 55 % (ref 32–75)
NITRITE UR QL STRIP.AUTO: POSITIVE
NRBC # BLD: 0 K/UL (ref 0–0.01)
NRBC BLD-RTO: 0 PER 100 WBC
PH UR STRIP: 5.5 [PH] (ref 5–8)
PLATELET # BLD AUTO: 200 K/UL (ref 150–400)
PMV BLD AUTO: 10.5 FL (ref 8.9–12.9)
POTASSIUM SERPL-SCNC: 4 MMOL/L (ref 3.5–5.1)
PROT SERPL-MCNC: 7.5 G/DL (ref 6.4–8.2)
PROT UR STRIP-MCNC: NEGATIVE MG/DL
RBC # BLD AUTO: 4.79 M/UL (ref 3.8–5.2)
RBC #/AREA URNS HPF: ABNORMAL /HPF (ref 0–5)
SODIUM SERPL-SCNC: 139 MMOL/L (ref 136–145)
SP GR UR REFRACTOMETRY: 1.02 (ref 1–1.03)
UA: UC IF INDICATED,UAUC: ABNORMAL
UROBILINOGEN UR QL STRIP.AUTO: 0.2 EU/DL (ref 0.2–1)
WBC # BLD AUTO: 5.1 K/UL (ref 3.6–11)
WBC URNS QL MICRO: ABNORMAL /HPF (ref 0–4)

## 2019-03-07 PROCEDURE — 85025 COMPLETE CBC W/AUTO DIFF WBC: CPT

## 2019-03-07 PROCEDURE — 87077 CULTURE AEROBIC IDENTIFY: CPT

## 2019-03-07 PROCEDURE — 87186 SC STD MICRODIL/AGAR DIL: CPT

## 2019-03-07 PROCEDURE — 74011250637 HC RX REV CODE- 250/637: Performed by: EMERGENCY MEDICINE

## 2019-03-07 PROCEDURE — 81001 URINALYSIS AUTO W/SCOPE: CPT

## 2019-03-07 PROCEDURE — 80053 COMPREHEN METABOLIC PANEL: CPT

## 2019-03-07 PROCEDURE — 36415 COLL VENOUS BLD VENIPUNCTURE: CPT

## 2019-03-07 PROCEDURE — 99283 EMERGENCY DEPT VISIT LOW MDM: CPT

## 2019-03-07 PROCEDURE — 87086 URINE CULTURE/COLONY COUNT: CPT

## 2019-03-07 PROCEDURE — 76770 US EXAM ABDO BACK WALL COMP: CPT

## 2019-03-07 RX ORDER — TOPIRAMATE 50 MG/1
50 TABLET, FILM COATED ORAL 2 TIMES DAILY
COMMUNITY
End: 2021-10-06 | Stop reason: ALTCHOICE

## 2019-03-07 RX ORDER — BUTALBITAL, ASPIRIN, AND CAFFEINE 325; 50; 40 MG/1; MG/1; MG/1
1 CAPSULE ORAL
COMMUNITY
End: 2019-03-20

## 2019-03-07 RX ORDER — GRANULES FOR ORAL 3 G/1
3 POWDER ORAL ONCE
Status: COMPLETED | OUTPATIENT
Start: 2019-03-07 | End: 2019-03-07

## 2019-03-07 RX ORDER — GRANULES FOR ORAL 3 G/1
3 POWDER ORAL ONCE
Qty: 1 PACKET | Refills: 0 | Status: SHIPPED | OUTPATIENT
Start: 2019-03-10 | End: 2019-03-14

## 2019-03-07 RX ORDER — BUPROPION HYDROCHLORIDE 150 MG/1
150 TABLET, EXTENDED RELEASE ORAL 2 TIMES DAILY
COMMUNITY
End: 2019-03-20

## 2019-03-07 RX ORDER — OXYCODONE HYDROCHLORIDE 5 MG/1
5 TABLET ORAL
Status: COMPLETED | OUTPATIENT
Start: 2019-03-07 | End: 2019-03-07

## 2019-03-07 RX ORDER — OXYCODONE HYDROCHLORIDE 5 MG/1
5 TABLET ORAL
Qty: 12 TAB | Refills: 0 | Status: SHIPPED | OUTPATIENT
Start: 2019-03-07 | End: 2019-03-10

## 2019-03-07 RX ADMIN — FOSFOMYCIN TROMETHAMINE 3 G: 3 POWDER ORAL at 21:02

## 2019-03-07 RX ADMIN — OXYCODONE HYDROCHLORIDE 5 MG: 5 TABLET ORAL at 21:01

## 2019-03-08 NOTE — DISCHARGE INSTRUCTIONS
Patient Education        Back Pain: Care Instructions  Your Care Instructions    Back pain has many possible causes. It is often related to problems with muscles and ligaments of the back. It may also be related to problems with the nerves, discs, or bones of the back. Moving, lifting, standing, sitting, or sleeping in an awkward way can strain the back. Sometimes you don't notice the injury until later. Arthritis is another common cause of back pain. Although it may hurt a lot, back pain usually improves on its own within several weeks. Most people recover in 12 weeks or less. Using good home treatment and being careful not to stress your back can help you feel better sooner. Follow-up care is a key part of your treatment and safety. Be sure to make and go to all appointments, and call your doctor if you are having problems. It's also a good idea to know your test results and keep a list of the medicines you take. How can you care for yourself at home? · Sit or lie in positions that are most comfortable and reduce your pain. Try one of these positions when you lie down:  ? Lie on your back with your knees bent and supported by large pillows. ? Lie on the floor with your legs on the seat of a sofa or chair. ? Lie on your side with your knees and hips bent and a pillow between your legs. ? Lie on your stomach if it does not make pain worse. · Do not sit up in bed, and avoid soft couches and twisted positions. Bed rest can help relieve pain at first, but it delays healing. Avoid bed rest after the first day of back pain. · Change positions every 30 minutes. If you must sit for long periods of time, take breaks from sitting. Get up and walk around, or lie in a comfortable position. · Try using a heating pad on a low or medium setting for 15 to 20 minutes every 2 or 3 hours. Try a warm shower in place of one session with the heating pad. · You can also try an ice pack for 10 to 15 minutes every 2 to 3 hours. Put a thin cloth between the ice pack and your skin. · Take pain medicines exactly as directed. ? If the doctor gave you a prescription medicine for pain, take it as prescribed. ? If you are not taking a prescription pain medicine, ask your doctor if you can take an over-the-counter medicine. · Take short walks several times a day. You can start with 5 to 10 minutes, 3 or 4 times a day, and work up to longer walks. Walk on level surfaces and avoid hills and stairs until your back is better. · Return to work and other activities as soon as you can. Continued rest without activity is usually not good for your back. · To prevent future back pain, do exercises to stretch and strengthen your back and stomach. Learn how to use good posture, safe lifting techniques, and proper body mechanics. When should you call for help? Call your doctor now or seek immediate medical care if:    · You have new or worsening numbness in your legs.     · You have new or worsening weakness in your legs. (This could make it hard to stand up.)     · You lose control of your bladder or bowels.    Watch closely for changes in your health, and be sure to contact your doctor if:    · You have a fever, lose weight, or don't feel well.     · You do not get better as expected. Where can you learn more? Go to http://praful-monserrat.info/. Enter A542 in the search box to learn more about \"Back Pain: Care Instructions. \"  Current as of: September 20, 2018  Content Version: 11.9  © 8507-0023 [employer]. Care instructions adapted under license by [employer] (which disclaims liability or warranty for this information). If you have questions about a medical condition or this instruction, always ask your healthcare professional. [place] [address] any warranty or liability for your use of this information.

## 2019-03-08 NOTE — ED TRIAGE NOTES
Pt arrives to ER with urinary discomfort. Pt states she has been using catheters for 4-6 months TID. PT reports having constant pain in pelvic area, pt has extreme pain with touch or movement. Pt states able to urinate very small amounts. Pt also having episodes of dizziness and lethargy. Pt reports being on several antibiotics recently. Pt placed on monitor x3, with call bell in reach.

## 2019-03-08 NOTE — ED PROVIDER NOTES
EMERGENCY DEPARTMENT HISTORY AND PHYSICAL EXAM      Date: 3/7/2019  Patient Name: Dominic Cotton    History of Presenting Illness     Chief Complaint   Patient presents with    Urinary Pain     Pt c/o UTI x 3 months and states she has been on multiple abx and is having continued urinary pain       History Provided By: Patient    HPI: Dominic Cotton, 50 y.o. female with PMHx significant for sarcoidosis, anemia, chronic pain, chronic UTI, genital organ prolapse, presents ambulatory to the ED with cc of persistent pelvic pain, lower back pain, dysuria and urinary frequency x \"months\" worsening over the past day. She explains that she has had recurrent UTI's, noting that she has most recently been on ciprofloxacin, bactrim, and nitrofurantoin. Pt reports that she has been taking BC powder w/o any relief of her pain. She notes that she sees GYN ONC due to genital organ prolapse, and states that she has been told that it may be the cause of her recurrent UTI's. Pt denies any alleviating/exacerbating factors for her current sx's. She specifically denies any fever, chills, SOB, CP, HA, N/V/D, or rash. There are no other complaints, changes, or physical findings at this time. Social Hx: + EtOH; - Smoker; - Illicit Drugs     PCP: Eliana Chambers MD   GYN-ONC: Trudy Sandhu MD    Current Outpatient Medications   Medication Sig Dispense Refill    buPROPion SR [facility] SR) 150 mg SR tablet Take 150 mg by mouth two (2) times a day.  butalbital-aspirin-caffeine (FIORINAL) capsule Take 1 Cap by mouth every four (4) hours as needed for Pain.  topiramate (TOPAMAX) 50 mg tablet Take 50 mg by mouth two (2) times a day.  oxyCODONE IR (ROXICODONE) 5 mg immediate release tablet Take 1 Tab by mouth every six (6) hours as needed for Pain for up to 3 days. Max Daily Amount: 20 mg. 12 Tab 0    [START ON 3/10/2019] fosfomycin (MONUROL) 3 gram pack oral packet Take 1 Packet by mouth once for 1 dose.  1 Packet 0    trimethoprim-sulfamethoxazole (BACTRIM)  mg per tablet Take 1 Tab by mouth daily.  metroNIDAZOLE (FLAGYL) 500 mg tablet Take 1 Tab by mouth three (3) times daily as needed for Diarrhea or Other. 20 Tab 0    cyanocobalamin (VITAMIN B12) 1,000 mcg/mL injection 1,000 mcg by IntraMUSCular route every twenty-eight (28) days.  traZODone (DESYREL) 100 mg tablet Take 100 mg by mouth nightly as needed for Sleep.  FLUoxetine (PROZAC) 40 mg capsule Take 40 mg by mouth daily.  ASPIRIN/SALICYLAMIDE/CAFFEINE (BC HEADACHE POWDER PO) Take 1 Packet by mouth daily as needed (headache).  nebivolol (BYSTOLIC) 5 mg tablet Take 5 mg by mouth daily as needed ('rapid heart beat').  levothyroxine (SYNTHROID) 50 mcg tablet Take 50 mcg by mouth Daily (before breakfast).  ergocalciferol (VITAMIN D2) 50,000 unit capsule Take 50,000 Units by mouth two (2) times a week.  'On  and  or .'         Past History     Past Medical History:  Past Medical History:   Diagnosis Date    Anemia     Anxiety     Arrhythmia     tachy with exertion; HAS PRN MEDS FOR THIS    Arthritis     osteoarthritis    Autoimmune disease ([address]     sarcoidosis    Bowel trouble     Burning with urination     Chronic pain     Connective tissue disorder ([address]     Depression     NOT SEVERE    Dyspepsia and other specified disorders of function of stomach     Headache     Hypoglycemia     Hypothyroid     Osteoporosis     Prolapse of female genital organs     Sarcoidosis        Past Surgical History:  Past Surgical History:   Procedure Laterality Date    HX BLADDER SUSPENSION      HX CATARACT REMOVAL      HX  SECTION      X2    HX GASTRIC BYPASS  1999    HX HERNIA REPAIR      HX HYSTERECTOMY      HX OTHER SURGICAL      TUMMY TUCK    NEUROLOGICAL PROCEDURE UNLISTED  ,     back surgery        Family History:  Family History   Problem Relation Age of Onset  Hypertension Father     Diabetes Father     Cancer Father         SKIN, \"MOLE\", AND A CANCER OVER EYE    Diabetes Paternal Aunt     Heart Disease Maternal Grandmother     Stroke Maternal Grandmother     Anemia Mother     No Known Problems Brother     Anesth Problems Other         \"REALLY BAD REACTION, I DON'T REMEMBER WHAT HAPPENED\"    No Known Problems Son     No Known Problems Daughter     No Known Problems Daughter        Social History:  Social History     Tobacco Use    Smoking status: Never Smoker    Smokeless tobacco: Never Used   Substance Use Topics    Alcohol use: No     Alcohol/week: 0.6 oz     Types: 1 Glasses of wine per week    Drug use: No       Allergies:  No Known Allergies    Review of Systems   Review of Systems   Constitutional: Negative for chills and fever. HENT: Negative for congestion and sore throat. Eyes: Negative for visual disturbance. Respiratory: Negative for cough and shortness of breath. Cardiovascular: Negative for chest pain and leg swelling. Gastrointestinal: Negative for abdominal pain, blood in stool, diarrhea and nausea. Endocrine: Negative for polyuria. Genitourinary: Positive for dysuria. Negative for flank pain, vaginal bleeding and vaginal discharge. Musculoskeletal: Positive for back pain. Negative for myalgias. Skin: Negative for rash. Allergic/Immunologic: Negative for immunocompromised state. Neurological: Negative for weakness and headaches. Psychiatric/Behavioral: Negative for confusion. Physical Exam   Physical Exam   Constitutional: She is oriented to person, place, and time. She appears well-developed and well-nourished. HENT:   Head: Normocephalic and atraumatic. Moist mucous membranes   Eyes: Conjunctivae are normal. Pupils are equal, round, and reactive to light. Right eye exhibits no discharge. Left eye exhibits no discharge. Neck: Normal range of motion. Neck supple. No tracheal deviation present. Cardiovascular: Normal rate, regular rhythm and normal heart sounds. No murmur heard. Pulmonary/Chest: Effort normal and breath sounds normal. No respiratory distress. She has no wheezes. She has no rales. Abdominal: Soft. Bowel sounds are normal. There is no tenderness. There is no rebound, no guarding and no CVA tenderness. Musculoskeletal: Normal range of motion. She exhibits no edema, tenderness or deformity. Neurological: She is alert and oriented to person, place, and time. Skin: Skin is warm and dry. No rash noted. No erythema. Psychiatric: Her behavior is normal.   Nursing note and vitals reviewed. Diagnostic Study Results     Labs -     Recent Results (from the past 12 hour(s))   CBC WITH AUTOMATED DIFF    Collection Time: 03/07/19  7:17 PM   Result Value Ref Range    WBC 5.1 3.6 - 11.0 K/uL    RBC 4.79 3.80 - 5.20 M/uL    HGB 14.4 11.5 - 16.0 g/dL    HCT 44.8 35.0 - 47.0 %    MCV 93.5 80.0 - 99.0 FL    MCH 30.1 26.0 - 34.0 PG    MCHC 32.1 30.0 - 36.5 g/dL    RDW 12.7 11.5 - 14.5 %    PLATELET 793 812 - 182 K/uL    MPV 10.5 8.9 - 12.9 FL    NRBC 0.0 0  WBC    ABSOLUTE NRBC 0.00 0.00 - 0.01 K/uL    NEUTROPHILS 55 32 - 75 %    LYMPHOCYTES 35 12 - 49 %    MONOCYTES 8 5 - 13 %    EOSINOPHILS 2 0 - 7 %    BASOPHILS 0 0 - 1 %    IMMATURE GRANULOCYTES 0 0.0 - 0.5 %    ABS. NEUTROPHILS 2.7 1.8 - 8.0 K/UL    ABS. LYMPHOCYTES 1.8 0.8 - 3.5 K/UL    ABS. MONOCYTES 0.4 0.0 - 1.0 K/UL    ABS. EOSINOPHILS 0.1 0.0 - 0.4 K/UL    ABS. BASOPHILS 0.0 0.0 - 0.1 K/UL    ABS. IMM.  GRANS. 0.0 0.00 - 0.04 K/UL    DF AUTOMATED     METABOLIC PANEL, COMPREHENSIVE    Collection Time: 03/07/19  7:17 PM   Result Value Ref Range    Sodium 139 136 - 145 mmol/L    Potassium 4.0 3.5 - 5.1 mmol/L    Chloride 108 97 - 108 mmol/L    CO2 25 21 - 32 mmol/L    Anion gap 6 5 - 15 mmol/L    Glucose 89 65 - 100 mg/dL    BUN 16 6 - 20 MG/DL    Creatinine 0.56 0.55 - 1.02 MG/DL    BUN/Creatinine ratio 29 (H) 12 - 20      GFR est AA >60 >60 ml/min/1.73m2    GFR est non-AA >60 >60 ml/min/1.73m2    Calcium 8.6 8.5 - 10.1 MG/DL    Bilirubin, total 0.2 0.2 - 1.0 MG/DL    ALT (SGPT) 32 12 - 78 U/L    AST (SGOT) 28 15 - 37 U/L    Alk. phosphatase 101 45 - 117 U/L    Protein, total 7.5 6.4 - 8.2 g/dL    Albumin 4.2 3.5 - 5.0 g/dL    Globulin 3.3 2.0 - 4.0 g/dL    A-G Ratio 1.3 1.1 - 2.2     URINALYSIS W/ REFLEX CULTURE    Collection Time: 03/07/19  7:43 PM   Result Value Ref Range    Color YELLOW/STRAW      Appearance CLEAR CLEAR      Specific gravity 1.018 1.003 - 1.030      pH (UA) 5.5 5.0 - 8.0      Protein NEGATIVE  NEG mg/dL    Glucose NEGATIVE  NEG mg/dL    Ketone NEGATIVE  NEG mg/dL    Bilirubin NEGATIVE  NEG      Blood NEGATIVE  NEG      Urobilinogen 0.2 0.2 - 1.0 EU/dL    Nitrites POSITIVE (A) NEG      Leukocyte Esterase NEGATIVE  NEG      WBC 0-4 0 - 4 /hpf    RBC 0-5 0 - 5 /hpf    Epithelial cells FEW FEW /lpf    Bacteria 4+ (A) NEG /hpf    UA:UC IF INDICATED URINE CULTURE ORDERED (A) CNI      Hyaline cast 0-2 0 - 5 /lpf       Radiologic Studies -   US RETROPERITONEUM COMP   Final Result   IMPRESSION: Normal renal ultrasound examination. Medical Decision Making   I am the first provider for this patient. I reviewed the vital signs, available nursing notes, past medical history, past surgical history, family history and social history. Vital Signs-Reviewed the patient's vital signs. Patient Vitals for the past 12 hrs:   Temp Pulse Resp BP SpO2   03/07/19 1738 98.3 °F (36.8 °C) 97 17 (!) 152/93 96 %       Pulse Oximetry Analysis - 96% on RA    Cardiac Monitor:   Rate: 97 bpm  Rhythm: Normal Sinus Rhythm      Records Reviewed: Nursing Notes, Old Medical Records, Previous Radiology Studies and Previous Laboratory Studies    Provider Notes (Medical Decision Making):   DDx: musculoskeletal back pain, UTI. Doubt pyelonephritis. ED Course:   Initial assessment performed.  The patients presenting problems have been discussed, and they are in agreement with the care plan formulated and outlined with them. I have encouraged them to ask questions as they arise throughout their visit. Critical Care Time:   None    Disposition:  Discharge Note:  10:12 PM  The patient has been re-evaluated and is ready for discharge. Reviewed available results with patient. Counseled patient/parent/guardian on diagnosis and care plan. Patient has expressed understanding, and all questions have been answered. Patient agrees with plan and agrees to follow up as recommended, or return to the ED if their symptoms worsen. Discharge instructions have been provided and explained to the patient, along with reasons to return to the ED. PLAN:  1. Current Discharge Medication List      START taking these medications    Details   oxyCODONE IR (ROXICODONE) 5 mg immediate release tablet Take 1 Tab by mouth every six (6) hours as needed for Pain for up to 3 days. Max Daily Amount: 20 mg.  Qty: 12 Tab, Refills: 0    Associated Diagnoses: Acute bilateral low back pain without sciatica      fosfomycin (MONUROL) 3 gram pack oral packet Take 1 Packet by mouth once for 1 dose. Qty: 1 Packet, Refills: 0           2. Follow-up Information     Follow up With Specialties Details Why Contact Info    Dhaval Sanders MD Family Practice Schedule an appointment as soon as possible for a visit  [facility]  [address] [facility]      Reggie Calvo MD Gynecology Schedule an appointment as soon as possible for a visit  [address] [place] [address] 137  [address] [facility]  [phone]      [facility] EMERGENCY DEPT Emergency Medicine  If symptoms worsen [address] [place] [address]  [phone]        Return to ED if worse     Diagnosis     Clinical Impression:   1. Urinary tract infection without hematuria, site unspecified    2.  Acute bilateral low back pain without sciatica        This note is prepared by Hodan Abbasi, acting as Scribe for [employer], DO,    [employer], DO: The scribe's documentation has been prepared under my direction and personally reviewed by me in its entirety. I confirm that the note above accurately reflects all work, treatment, procedures, and medical decision making performed by me. This note will not be viewable in [address]

## 2019-03-08 NOTE — ED NOTES
Discharge instructions given to patient by MD Мария Hemphill. Verbalized understanding of instructions. Patient discharged without difficulty. Patient discharged in stable condition via ambulation accompanied by family.

## 2019-03-09 LAB
BACTERIA SPEC CULT: ABNORMAL
CC UR VC: ABNORMAL
SERVICE CMNT-IMP: ABNORMAL

## 2019-03-14 RX ORDER — GRANULES FOR ORAL 3 G/1
3 POWDER ORAL ONCE
Qty: 1 PACKET | Refills: 1 | Status: SHIPPED | OUTPATIENT
Start: 2019-03-14 | End: 2019-03-14

## 2019-03-19 ENCOUNTER — APPOINTMENT (OUTPATIENT)
Dept: CT IMAGING | Age: 49
DRG: 690 | End: 2019-03-19
Attending: EMERGENCY MEDICINE
Payer: COMMERCIAL

## 2019-03-19 ENCOUNTER — HOSPITAL ENCOUNTER (INPATIENT)
Age: 49
LOS: 1 days | Discharge: HOME | DRG: 690 | End: 2019-03-21
Attending: EMERGENCY MEDICINE | Admitting: INTERNAL MEDICINE
Payer: COMMERCIAL

## 2019-03-19 DIAGNOSIS — R10.84 ABDOMINAL PAIN, GENERALIZED: ICD-10-CM

## 2019-03-19 DIAGNOSIS — R10.9 BILATERAL FLANK PAIN: ICD-10-CM

## 2019-03-19 DIAGNOSIS — N30.00 ACUTE CYSTITIS WITHOUT HEMATURIA: Primary | ICD-10-CM

## 2019-03-19 LAB
ALBUMIN SERPL-MCNC: 3.8 G/DL (ref 3.5–5)
ALBUMIN/GLOB SERPL: 1.1 {RATIO} (ref 1.1–2.2)
ALP SERPL-CCNC: 93 U/L (ref 45–117)
ALT SERPL-CCNC: 32 U/L (ref 12–78)
ANION GAP SERPL CALC-SCNC: 5 MMOL/L (ref 5–15)
APPEARANCE UR: CLEAR
AST SERPL-CCNC: 26 U/L (ref 15–37)
BACTERIA URNS QL MICRO: ABNORMAL /HPF
BASOPHILS # BLD: 0 K/UL (ref 0–0.1)
BASOPHILS NFR BLD: 0 % (ref 0–1)
BILIRUB SERPL-MCNC: 0.1 MG/DL (ref 0.2–1)
BILIRUB UR QL: NEGATIVE
BUN SERPL-MCNC: 22 MG/DL (ref 6–20)
BUN/CREAT SERPL: 25 (ref 12–20)
CALCIUM SERPL-MCNC: 8.6 MG/DL (ref 8.5–10.1)
CHLORIDE SERPL-SCNC: 107 MMOL/L (ref 97–108)
CO2 SERPL-SCNC: 27 MMOL/L (ref 21–32)
COLOR UR: ABNORMAL
CREAT SERPL-MCNC: 0.89 MG/DL (ref 0.55–1.02)
DIFFERENTIAL METHOD BLD: NORMAL
EOSINOPHIL # BLD: 0.2 K/UL (ref 0–0.4)
EOSINOPHIL NFR BLD: 3 % (ref 0–7)
EPITH CASTS URNS QL MICRO: ABNORMAL /LPF
ERYTHROCYTE [DISTWIDTH] IN BLOOD BY AUTOMATED COUNT: 12.7 % (ref 11.5–14.5)
GLOBULIN SER CALC-MCNC: 3.4 G/DL (ref 2–4)
GLUCOSE SERPL-MCNC: 92 MG/DL (ref 65–100)
GLUCOSE UR STRIP.AUTO-MCNC: NEGATIVE MG/DL
HCT VFR BLD AUTO: 41.4 % (ref 35–47)
HGB BLD-MCNC: 13.1 G/DL (ref 11.5–16)
HGB UR QL STRIP: NEGATIVE
HYALINE CASTS URNS QL MICRO: ABNORMAL /LPF (ref 0–5)
IMM GRANULOCYTES # BLD AUTO: 0 K/UL (ref 0–0.04)
IMM GRANULOCYTES NFR BLD AUTO: 0 % (ref 0–0.5)
KETONES UR QL STRIP.AUTO: NEGATIVE MG/DL
LACTATE SERPL-SCNC: 0.6 MMOL/L (ref 0.4–2)
LEUKOCYTE ESTERASE UR QL STRIP.AUTO: ABNORMAL
LYMPHOCYTES # BLD: 2.2 K/UL (ref 0.8–3.5)
LYMPHOCYTES NFR BLD: 33 % (ref 12–49)
MCH RBC QN AUTO: 30.3 PG (ref 26–34)
MCHC RBC AUTO-ENTMCNC: 31.6 G/DL (ref 30–36.5)
MCV RBC AUTO: 95.6 FL (ref 80–99)
MONOCYTES # BLD: 0.6 K/UL (ref 0–1)
MONOCYTES NFR BLD: 8 % (ref 5–13)
NEUTS SEG # BLD: 3.8 K/UL (ref 1.8–8)
NEUTS SEG NFR BLD: 56 % (ref 32–75)
NITRITE UR QL STRIP.AUTO: NEGATIVE
NRBC # BLD: 0 K/UL (ref 0–0.01)
NRBC BLD-RTO: 0 PER 100 WBC
PH UR STRIP: 6.5 [PH] (ref 5–8)
PLATELET # BLD AUTO: 226 K/UL (ref 150–400)
PMV BLD AUTO: 10 FL (ref 8.9–12.9)
POTASSIUM SERPL-SCNC: 4.4 MMOL/L (ref 3.5–5.1)
PROT SERPL-MCNC: 7.2 G/DL (ref 6.4–8.2)
PROT UR STRIP-MCNC: NEGATIVE MG/DL
RBC # BLD AUTO: 4.33 M/UL (ref 3.8–5.2)
RBC #/AREA URNS HPF: ABNORMAL /HPF (ref 0–5)
SODIUM SERPL-SCNC: 139 MMOL/L (ref 136–145)
SP GR UR REFRACTOMETRY: 1.02 (ref 1–1.03)
UA: UC IF INDICATED,UAUC: ABNORMAL
UROBILINOGEN UR QL STRIP.AUTO: 0.2 EU/DL (ref 0.2–1)
WBC # BLD AUTO: 6.7 K/UL (ref 3.6–11)
WBC URNS QL MICRO: ABNORMAL /HPF (ref 0–4)

## 2019-03-19 PROCEDURE — 99285 EMERGENCY DEPT VISIT HI MDM: CPT

## 2019-03-19 PROCEDURE — 96375 TX/PRO/DX INJ NEW DRUG ADDON: CPT

## 2019-03-19 PROCEDURE — 80053 COMPREHEN METABOLIC PANEL: CPT

## 2019-03-19 PROCEDURE — 74011250636 HC RX REV CODE- 250/636: Performed by: EMERGENCY MEDICINE

## 2019-03-19 PROCEDURE — 83605 ASSAY OF LACTIC ACID: CPT

## 2019-03-19 PROCEDURE — 74177 CT ABD & PELVIS W/CONTRAST: CPT

## 2019-03-19 PROCEDURE — 87186 SC STD MICRODIL/AGAR DIL: CPT

## 2019-03-19 PROCEDURE — 87040 BLOOD CULTURE FOR BACTERIA: CPT

## 2019-03-19 PROCEDURE — 85025 COMPLETE CBC W/AUTO DIFF WBC: CPT

## 2019-03-19 PROCEDURE — 87086 URINE CULTURE/COLONY COUNT: CPT

## 2019-03-19 PROCEDURE — 96365 THER/PROPH/DIAG IV INF INIT: CPT

## 2019-03-19 PROCEDURE — 36415 COLL VENOUS BLD VENIPUNCTURE: CPT

## 2019-03-19 PROCEDURE — 87077 CULTURE AEROBIC IDENTIFY: CPT

## 2019-03-19 PROCEDURE — 74011636320 HC RX REV CODE- 636/320: Performed by: EMERGENCY MEDICINE

## 2019-03-19 PROCEDURE — 81001 URINALYSIS AUTO W/SCOPE: CPT

## 2019-03-19 PROCEDURE — 96361 HYDRATE IV INFUSION ADD-ON: CPT

## 2019-03-19 PROCEDURE — 74011000258 HC RX REV CODE- 258: Performed by: EMERGENCY MEDICINE

## 2019-03-19 RX ORDER — GRANULES FOR ORAL 3 G/1
3 POWDER ORAL ONCE
COMMUNITY
End: 2019-03-20

## 2019-03-19 RX ORDER — FENTANYL CITRATE 50 UG/ML
25 INJECTION, SOLUTION INTRAMUSCULAR; INTRAVENOUS
Status: COMPLETED | OUTPATIENT
Start: 2019-03-19 | End: 2019-03-19

## 2019-03-19 RX ORDER — ONDANSETRON 2 MG/ML
4 INJECTION INTRAMUSCULAR; INTRAVENOUS
Status: COMPLETED | OUTPATIENT
Start: 2019-03-19 | End: 2019-03-19

## 2019-03-19 RX ORDER — SODIUM CHLORIDE 0.9 % (FLUSH) 0.9 %
10 SYRINGE (ML) INJECTION
Status: COMPLETED | OUTPATIENT
Start: 2019-03-19 | End: 2019-03-19

## 2019-03-19 RX ADMIN — Medication 10 ML: at 22:12

## 2019-03-19 RX ADMIN — SODIUM CHLORIDE 1000 ML: 900 INJECTION, SOLUTION INTRAVENOUS at 22:46

## 2019-03-19 RX ADMIN — IOPAMIDOL 100 ML: 755 INJECTION, SOLUTION INTRAVENOUS at 22:12

## 2019-03-19 RX ADMIN — PIPERACILLIN SODIUM,TAZOBACTAM SODIUM 3.38 G: 3; .375 INJECTION, POWDER, FOR SOLUTION INTRAVENOUS at 21:06

## 2019-03-19 RX ADMIN — FENTANYL CITRATE 25 MCG: 50 INJECTION, SOLUTION INTRAMUSCULAR; INTRAVENOUS at 22:18

## 2019-03-19 RX ADMIN — ONDANSETRON 4 MG: 2 INJECTION INTRAMUSCULAR; INTRAVENOUS at 22:18

## 2019-03-19 NOTE — ED TRIAGE NOTES
Pt presents from home via walk in triage for follow up on call back request for bacteruria. Pt was on medication at home for UTI and completed course. States gets frequent infections due to anatomical changes from prolapses.

## 2019-03-20 LAB
ANION GAP SERPL CALC-SCNC: 5 MMOL/L (ref 5–15)
BUN SERPL-MCNC: 23 MG/DL (ref 6–20)
BUN/CREAT SERPL: 45 (ref 12–20)
CALCIUM SERPL-MCNC: 8.1 MG/DL (ref 8.5–10.1)
CHLORIDE SERPL-SCNC: 112 MMOL/L (ref 97–108)
CO2 SERPL-SCNC: 26 MMOL/L (ref 21–32)
CREAT SERPL-MCNC: 0.51 MG/DL (ref 0.55–1.02)
ERYTHROCYTE [DISTWIDTH] IN BLOOD BY AUTOMATED COUNT: 13 % (ref 11.5–14.5)
GLUCOSE SERPL-MCNC: 86 MG/DL (ref 65–100)
HCT VFR BLD AUTO: 35.9 % (ref 35–47)
HGB BLD-MCNC: 11.6 G/DL (ref 11.5–16)
MCH RBC QN AUTO: 30.6 PG (ref 26–34)
MCHC RBC AUTO-ENTMCNC: 32.3 G/DL (ref 30–36.5)
MCV RBC AUTO: 94.7 FL (ref 80–99)
NRBC # BLD: 0 K/UL (ref 0–0.01)
NRBC BLD-RTO: 0 PER 100 WBC
PLATELET # BLD AUTO: 181 K/UL (ref 150–400)
PMV BLD AUTO: 9.8 FL (ref 8.9–12.9)
POTASSIUM SERPL-SCNC: 4 MMOL/L (ref 3.5–5.1)
RBC # BLD AUTO: 3.79 M/UL (ref 3.8–5.2)
SODIUM SERPL-SCNC: 143 MMOL/L (ref 136–145)
TSH SERPL DL<=0.05 MIU/L-ACNC: 2.87 UIU/ML (ref 0.36–3.74)
WBC # BLD AUTO: 4.9 K/UL (ref 3.6–11)

## 2019-03-20 PROCEDURE — 74011250636 HC RX REV CODE- 250/636: Performed by: EMERGENCY MEDICINE

## 2019-03-20 PROCEDURE — 80048 BASIC METABOLIC PNL TOTAL CA: CPT

## 2019-03-20 PROCEDURE — 84443 ASSAY THYROID STIM HORMONE: CPT

## 2019-03-20 PROCEDURE — 65270000015 HC RM PRIVATE ONCOLOGY

## 2019-03-20 PROCEDURE — 74011250637 HC RX REV CODE- 250/637: Performed by: INTERNAL MEDICINE

## 2019-03-20 PROCEDURE — 85027 COMPLETE CBC AUTOMATED: CPT

## 2019-03-20 PROCEDURE — 74011250637 HC RX REV CODE- 250/637

## 2019-03-20 PROCEDURE — 36415 COLL VENOUS BLD VENIPUNCTURE: CPT

## 2019-03-20 PROCEDURE — 74011250636 HC RX REV CODE- 250/636: Performed by: INTERNAL MEDICINE

## 2019-03-20 PROCEDURE — 74011000258 HC RX REV CODE- 258: Performed by: EMERGENCY MEDICINE

## 2019-03-20 RX ORDER — ACETAMINOPHEN 325 MG/1
650 TABLET ORAL
Status: DISCONTINUED | OUTPATIENT
Start: 2019-03-20 | End: 2019-03-21 | Stop reason: HOSPADM

## 2019-03-20 RX ORDER — GABAPENTIN 300 MG/1
300 CAPSULE ORAL 3 TIMES DAILY
Refills: 3 | COMMUNITY
Start: 2019-03-13 | End: 2021-10-06 | Stop reason: ALTCHOICE

## 2019-03-20 RX ORDER — GABAPENTIN 300 MG/1
300 CAPSULE ORAL 3 TIMES DAILY
Status: DISCONTINUED | OUTPATIENT
Start: 2019-03-20 | End: 2019-03-21 | Stop reason: HOSPADM

## 2019-03-20 RX ORDER — FUROSEMIDE 20 MG/1
20 TABLET ORAL DAILY
Status: DISCONTINUED | OUTPATIENT
Start: 2019-03-20 | End: 2019-03-21 | Stop reason: HOSPADM

## 2019-03-20 RX ORDER — FLUOXETINE HYDROCHLORIDE 20 MG/1
40 CAPSULE ORAL DAILY
Status: DISCONTINUED | OUTPATIENT
Start: 2019-03-20 | End: 2019-03-21 | Stop reason: HOSPADM

## 2019-03-20 RX ORDER — FUROSEMIDE 20 MG/1
20 TABLET ORAL
Refills: 0 | COMMUNITY
Start: 2019-02-28

## 2019-03-20 RX ORDER — BUPROPION HYDROCHLORIDE 150 MG/1
150 TABLET ORAL DAILY
Status: DISCONTINUED | OUTPATIENT
Start: 2019-03-21 | End: 2019-03-21 | Stop reason: HOSPADM

## 2019-03-20 RX ORDER — BUPROPION HYDROCHLORIDE 150 MG/1
150 TABLET, EXTENDED RELEASE ORAL 2 TIMES DAILY
Status: DISCONTINUED | OUTPATIENT
Start: 2019-03-20 | End: 2019-03-20 | Stop reason: DRUGHIGH

## 2019-03-20 RX ORDER — LEVOTHYROXINE SODIUM 50 UG/1
50 TABLET ORAL
Status: DISCONTINUED | OUTPATIENT
Start: 2019-03-20 | End: 2019-03-21 | Stop reason: HOSPADM

## 2019-03-20 RX ORDER — TRAMADOL HYDROCHLORIDE 50 MG/1
50 TABLET ORAL
Status: DISCONTINUED | OUTPATIENT
Start: 2019-03-20 | End: 2019-03-21 | Stop reason: HOSPADM

## 2019-03-20 RX ORDER — BUPROPION HYDROCHLORIDE 150 MG/1
150 TABLET ORAL DAILY
Refills: 0 | COMMUNITY
Start: 2019-02-01 | End: 2021-10-06

## 2019-03-20 RX ORDER — TRAZODONE HYDROCHLORIDE 100 MG/1
100 TABLET ORAL
Status: DISCONTINUED | OUTPATIENT
Start: 2019-03-20 | End: 2019-03-20 | Stop reason: DRUGHIGH

## 2019-03-20 RX ORDER — BUTALBITAL, ACETAMINOPHEN AND CAFFEINE 50; 325; 40 MG/1; MG/1; MG/1
2 TABLET ORAL
Refills: 0 | COMMUNITY
Start: 2019-02-22 | End: 2021-10-20

## 2019-03-20 RX ORDER — TRAMADOL HYDROCHLORIDE 50 MG/1
TABLET ORAL
Status: COMPLETED
Start: 2019-03-20 | End: 2019-03-20

## 2019-03-20 RX ORDER — TRAZODONE HYDROCHLORIDE 150 MG/1
150 TABLET ORAL
Refills: 0 | COMMUNITY
Start: 2019-02-01 | End: 2021-10-06

## 2019-03-20 RX ORDER — TOPIRAMATE 25 MG/1
50 TABLET ORAL 2 TIMES DAILY
Status: DISCONTINUED | OUTPATIENT
Start: 2019-03-20 | End: 2019-03-21 | Stop reason: HOSPADM

## 2019-03-20 RX ORDER — BUTALBITAL, ACETAMINOPHEN AND CAFFEINE 50; 325; 40 MG/1; MG/1; MG/1
1 TABLET ORAL
Status: DISCONTINUED | OUTPATIENT
Start: 2019-03-20 | End: 2019-03-21 | Stop reason: HOSPADM

## 2019-03-20 RX ORDER — ENOXAPARIN SODIUM 100 MG/ML
40 INJECTION SUBCUTANEOUS DAILY
Status: DISCONTINUED | OUTPATIENT
Start: 2019-03-20 | End: 2019-03-21 | Stop reason: HOSPADM

## 2019-03-20 RX ADMIN — FLUOXETINE 40 MG: 20 CAPSULE ORAL at 08:56

## 2019-03-20 RX ADMIN — ACETAMINOPHEN 650 MG: 325 TABLET ORAL at 15:10

## 2019-03-20 RX ADMIN — FUROSEMIDE 20 MG: 40 TABLET ORAL at 08:55

## 2019-03-20 RX ADMIN — ENOXAPARIN SODIUM 40 MG: 40 INJECTION SUBCUTANEOUS at 08:56

## 2019-03-20 RX ADMIN — TOPIRAMATE 50 MG: 25 TABLET, FILM COATED ORAL at 17:05

## 2019-03-20 RX ADMIN — BUPROPION HYDROCHLORIDE 150 MG: 150 TABLET, FILM COATED, EXTENDED RELEASE ORAL at 08:56

## 2019-03-20 RX ADMIN — PIPERACILLIN AND TAZOBACTAM 3.38 G: 3; .375 INJECTION, POWDER, FOR SOLUTION INTRAVENOUS at 13:57

## 2019-03-20 RX ADMIN — PIPERACILLIN AND TAZOBACTAM 3.38 G: 3; .375 INJECTION, POWDER, FOR SOLUTION INTRAVENOUS at 20:06

## 2019-03-20 RX ADMIN — GABAPENTIN 300 MG: 300 CAPSULE ORAL at 17:04

## 2019-03-20 RX ADMIN — TRAMADOL HYDROCHLORIDE: 50 TABLET, FILM COATED ORAL at 02:21

## 2019-03-20 RX ADMIN — LEVOTHYROXINE SODIUM 50 MCG: 50 TABLET ORAL at 06:31

## 2019-03-20 RX ADMIN — GABAPENTIN 300 MG: 300 CAPSULE ORAL at 22:00

## 2019-03-20 RX ADMIN — TRAMADOL HYDROCHLORIDE 50 MG: 50 TABLET, COATED ORAL at 15:10

## 2019-03-20 RX ADMIN — TOPIRAMATE 50 MG: 25 TABLET, FILM COATED ORAL at 08:55

## 2019-03-20 NOTE — PROGRESS NOTES
TRANSFER - IN REPORT: 
 
Verbal report received from Sherrell(name) on Oral Brawn  being received from ED(unit) for routine progression of care Report consisted of patients Situation, Background, Assessment and  
Recommendations(SBAR). Information from the following report(s) SBAR, Kardex, ED Summary, Intake/Output, MAR and Recent Results was reviewed with the receiving nurse. Opportunity for questions and clarification was provided. Assessment completed upon patients arrival to unit and care assumed.

## 2019-03-20 NOTE — ED NOTES
TRANSFER - OUT REPORT: 
 
Verbal report given to KARYN Bravo(name) on Lina Pal  being transferred to Med/Onc Rm 1141 (unit) for routine progression of care Report consisted of patients Situation, Background, Assessment and  
Recommendations(SBAR). Information from the following report(s) SBAR, Kardex, ED Summary, MAR, Accordion and Recent Results was reviewed with the receiving nurse. Lines:  
Peripheral IV 03/20/19 Left Forearm (Active) Site Assessment Clean, dry, & intact 3/20/2019  3:26 PM  
Phlebitis Assessment 0 3/20/2019  3:26 PM  
Infiltration Assessment 0 3/20/2019  3:26 PM  
Dressing Status Clean, dry, & intact 3/20/2019  3:26 PM  
Dressing Type Transparent;Tape 3/20/2019  3:26 PM  
Hub Color/Line Status Pink 3/20/2019  3:26 PM  
  
 
Opportunity for questions and clarification was provided. Patient transported with: 
 [employer]

## 2019-03-20 NOTE — PROGRESS NOTES
Problem: Risk for Spread of Infection Goal: Prevent transmission of infectious organism to others Description Prevent the transmission of infectious organisms to other patients, staff members, and visitors. Outcome: Progressing Towards Goal 
  
Problem: Patient Education:  Go to Education Activity Goal: Patient/Family Education Outcome: Progressing Towards Goal 
  
Problem: Pain Goal: *Control of Pain Outcome: Progressing Towards Goal 
Goal: *PALLIATIVE CARE:  Alleviation of Pain Outcome: Progressing Towards Goal 
  
Problem: Patient Education: Go to Patient Education Activity Goal: Patient/Family Education Outcome: Progressing Towards Goal 
  
Problem: Urinary Tract Infection - Adult Goal: *Absence of infection signs and symptoms Outcome: Progressing Towards Goal 
  
Problem: Patient Education: Go to Patient Education Activity Goal: Patient/Family Education Outcome: Progressing Towards Goal

## 2019-03-20 NOTE — ED PROVIDER NOTES
Patient is a 27-year-old  female with history of sarcoidosis, prolapsed female organs, and frequent UTIs, who presents with worsening bilateral flank pain and abdominal pain. She was seen here on , had a urine culture done which was sensitive to only IV antibiotics. She was started on Monuril but has not gotten any better. Her pain is a 8 out of 10 in severity, she denies fever or chills. She complains of a rash to the upper arms for the last day or 2. She says that he gets that when she gets under a lot of stress or when she is really sick. She denies nausea or vomiting. Past Medical History:  
Diagnosis Date  Anemia  Anxiety  Arrhythmia   
 tachy with exertion; HAS PRN MEDS FOR THIS  
 Arthritis   
 osteoarthritis  Autoimmune disease ([address]   
 sarcoidosis  Bowel trouble  Burning with urination  Chronic pain  Connective tissue disorder ([address]  Depression NOT SEVERE  Dyspepsia and other specified disorders of function of stomach   
 Headache  Hypoglycemia  Hypothyroid  Osteoporosis  Prolapse of female genital organs  Sarcoidosis Past Surgical History:  
Procedure Laterality Date  HX BLADDER SUSPENSION    
 HX CATARACT REMOVAL    HX  SECTION X2  
 HX GASTRIC BYPASS  1999  HX HERNIA REPAIR    
 HX HYSTERECTOMY    HX OTHER SURGICAL    
 TUMMY TUCK  
 NEUROLOGICAL PROCEDURE UNLISTED  ,   
 back surgery Family History:  
Problem Relation Age of Onset  Hypertension Father  Diabetes Father  Cancer Father SKIN, \"MOLE\", AND A CANCER OVER EYE  Diabetes Paternal Aunt  Heart Disease Maternal Grandmother  Stroke Maternal Grandmother  Anemia Mother  No Known Problems Brother  Anesth Problems Other \"REALLY BAD REACTION, I DON'T REMEMBER WHAT HAPPENED\"  No Known Problems Son  No Known Problems Daughter  No Known Problems Daughter Social History Socioeconomic History  Marital status:  Spouse name: Not on file  Number of children: Not on file  Years of education: Not on file  Highest education level: Not on file Social Needs  Financial resource strain: Not on file  Food insecurity - worry: Not on file  Food insecurity - inability: Not on file  Transportation needs - medical: Not on file  Transportation needs - non-medical: Not on file Occupational History  Not on file Tobacco Use  Smoking status: Never Smoker  Smokeless tobacco: Never Used Substance and Sexual Activity  Alcohol use: Yes Alcohol/week: 0.6 oz Types: 1 Glasses of wine per week  Drug use: No  
 Sexual activity: Not on file Other Topics Concern  Not on file Social History Narrative  Not on file ALLERGIES: Patient has no known allergies. Review of Systems Constitutional: Negative for fever. HENT: Negative for congestion. Eyes: Negative. Respiratory: Negative for shortness of breath. Cardiovascular: Negative for chest pain. Gastrointestinal: Positive for abdominal pain. Endocrine: Negative for heat intolerance. Genitourinary: Positive for dysuria and flank pain. Musculoskeletal: Negative for back pain. Skin: Positive for rash. Allergic/Immunologic: Negative for immunocompromised state. Neurological: Negative for dizziness. Hematological: Does not bruise/bleed easily. Psychiatric/Behavioral: Negative. All other systems reviewed and are negative. Vitals:  
 03/19/19 1515 03/19/19 1740 03/19/19 1934 BP: 135/77 142/79 151/86 Pulse: 89 (!) 101 (!) 102 Resp: 17 18 20 Temp: 98.2 °F (36.8 °C) 98.1 °F (36.7 °C) 98.3 °F (36.8 °C) SpO2: 100% 99% 100% Weight: 91.7 kg (202 lb 2.6 oz) Height: 5' 4\" (1.626 m) Physical Exam  
Constitutional: She appears distressed. Elevated BMI HENT:  
 Head: Normocephalic. Eyes: EOM are normal. Pupils are equal, round, and reactive to light. Neck: Normal range of motion. Neck supple. Cardiovascular: Normal rate, regular rhythm, normal heart sounds and intact distal pulses. Pulmonary/Chest: Effort normal and breath sounds normal. No stridor. She has no wheezes. Abdominal: Soft. Bowel sounds are normal. There is tenderness. Mild diffuse abdominal tenderness. Bilateral flank tenderness Musculoskeletal: Normal range of motion. She exhibits no tenderness or deformity. Neurological: She is alert. Skin: Skin is warm and dry. Rash noted. Macular rash on upper arms Psychiatric: She has a normal mood and affect. Her behavior is normal.  
Nursing note and vitals reviewed. MDM Number of Diagnoses or Management Options Diagnosis management comments: Urinary tract infection, pyelonephritis, kidney stone, electrolyte abnormality, dehydration Amount and/or Complexity of Data Reviewed Clinical lab tests: reviewed and ordered Tests in the radiology section of CPT®: ordered and reviewed Discuss the patient with other providers: yes Independent visualization of images, tracings, or specimens: yes Risk of Complications, Morbidity, and/or Mortality Presenting problems: moderate Diagnostic procedures: moderate Management options: moderate Patient Progress Patient progress: improved Procedures Progress note: 
 
Patient is feeling better.  
 
 
Consult note: 
 
Patient is being admitted by Dr. Deisy Silva, hospitalist

## 2019-03-20 NOTE — H&P
Hospitalist Admission NoteNAME: Madelyn Martínez :  1970 MRN:  [id number] Date/Time:  3/20/2019 4:59 AM 
 
Patient PCP: Sophia Resendez MD 
______________________________________________________________________ Given the patient's current clinical presentation, I have a high level of concern for decompensation if discharged from the emergency department. Complex decision making was performed, which includes reviewing the patient's available past medical records, laboratory results, and x-ray films. My assessment of this patient's clinical condition and my plan of care is as follows. Assessment / Plan: 
Note: Orders written in paper chart considering connect care was down at time of admission UTI POA with h/o VRE and h/o straight caths Pelvic-organ Prolapse (reported Bladder, Vagina and rectum) Suspect likely could be colonization as without fever, leukocytosis. Symptoms likely related to Vesicorectal prolapse with h/o failed multiple non surgical treatments She is been frustrated and developed depression/anxiety and other medical issues with delay in surgery as per patient and has been required recurrent ED visits I will treat her with IV zosyn for ? UTI I will ask urology consult for need for surgery Treat Pain with Tylenol and Tramadol CT A/P without acute changes in ED 
Q6H and PRN strightcaths f/u urine cultures Anxiety/Depression Continue Psych meds Leg swelling LFT ok, CT without cirrhosis UA without proteinurea Pt reported 2D echo WNL recently and claims to have paper copy and disc for it and someone will bring in am 
She reported extensive workup had been negative for it She received IV bolus in ED She takes PRN Lasix 20mg at home, will place 20mg daily h/o Sarcodosis per patient Hypothyroidism Continue synthroid Check TSH Migraine Headaches Continue Px meds PRN Fioricet Code Status: Full Code Surrogate Decision Maker:  DVT Prophylaxis: Lovenox Baseline: Functional 
  
  
 
Subjective: CHIEF COMPLAINT: bilateral flank pain radiating to groin HISTORY OF PRESENT ILLNESS:    
who presents with bilateral flank pain radiating to groin. As per patient, she has history of Pelvic Organs prolapse with prolapsing vagina, bladder and rectum. She claims is is been having constant UTIs without relief with abx. She reported chronic 10/10 pain in both side of back, sharp in nature, constant, radiating to groin. Pt denies any fever, chills, nausea, vomiting, diarrhea, chest pain, cough, shortness of breath. She has had required recurrent ED visits for same, and claims she is been seen by urologist and has had failed multiple times and getting depressed as surgery and urology appointments is been delayed. She found to have UTI in ED and noted to have history of ESBL. We were asked to admit for work up and evaluation of the above problems. Past Medical History:  
Diagnosis Date  Anemia  Anxiety  Arrhythmia   
 tachy with exertion; HAS PRN MEDS FOR THIS  
 Arthritis   
 osteoarthritis  Autoimmune disease (Maritza Colder)   
 sarcoidosis  Bowel trouble  Burning with urination  Chronic pain  Connective tissue disorder (Maritza Colder)  Depression NOT SEVERE  Dyspepsia and other specified disorders of function of stomach   
 Headache  Hypoglycemia  Hypothyroid  Osteoporosis  Prolapse of female genital organs  Sarcoidosis Past Surgical History:  
Procedure Laterality Date  HX BLADDER SUSPENSION    
 HX CATARACT REMOVAL    HX  SECTION X2  
 HX GASTRIC BYPASS  1999  HX HERNIA REPAIR    
 HX HYSTERECTOMY    HX OTHER SURGICAL    
 TUMMY TUCK  
 NEUROLOGICAL PROCEDURE UNLISTED  ,   
 back surgery Social History Tobacco Use  Smoking status: Never Smoker  Smokeless tobacco: Never Used Substance Use Topics  Alcohol use: Yes Alcohol/week: 0.6 oz Types: 1 Glasses of wine per week Family History Problem Relation Age of Onset  Hypertension Father  Diabetes Father  Cancer Father SKIN, \"MOLE\", AND A CANCER OVER EYE  Diabetes Paternal Aunt  Heart Disease Maternal Grandmother  Stroke Maternal Grandmother  Anemia Mother  No Known Problems Brother  Anesth Problems Other \"REALLY BAD REACTION, I DON'T REMEMBER WHAT HAPPENED\"  No Known Problems Son  No Known Problems Daughter  No Known Problems Daughter No Known Allergies Prior to Admission medications Medication Sig Start Date End Date Taking? Authorizing Provider  
fosfomycin (MONUROL) 3 gram pack oral packet Take 3 g by mouth once. Yes Guerline, MD Teja  
buPROPion SR (WELLBUTRIN SR) 150 mg SR tablet Take 150 mg by mouth two (2) times a day. Teja Cunha MD  
butalbital-aspirin-caffeine [facility]) capsule Take 1 Cap by mouth every four (4) hours as needed for Pain. Teja Cunha MD  
topiramate (TOPAMAX) 50 mg tablet Take 50 mg by mouth two (2) times a day. Teja Cunha MD  
trimethoprim-sulfamethoxazole (BACTRIM)  mg per tablet Take 1 Tab by mouth daily. Provider, Historical  
metroNIDAZOLE (FLAGYL) 500 mg tablet Take 1 Tab by mouth three (3) times daily as needed for Diarrhea or Other. 2/1/18   Thomas Granados MD  
cyanocobalamin (VITAMIN B12) 1,000 mcg/mL injection 1,000 mcg by IntraMUSCular route every twenty-eight (28) days. Provider, Historical  
traZODone (DESYREL) 100 mg tablet Take 100 mg by mouth nightly as needed for Sleep. Provider, Historical  
FLUoxetine (PROZAC) 40 mg capsule Take 40 mg by mouth daily. Provider, Historical  
ASPIRIN/SALICYLAMIDE/CAFFEINE (BC HEADACHE POWDER PO) Take 1 Packet by mouth daily as needed (headache).     Provider, Historical  
nebivolol (BYSTOLIC) 5 mg tablet Take 5 mg by mouth daily as needed ('rapid heart beat'). Other, MD Teja  
levothyroxine (SYNTHROID) 50 mcg tablet Take 50 mcg by mouth Daily (before breakfast). Provider, Historical  
ergocalciferol (VITAMIN D2) 50,000 unit capsule Take 50,000 Units by mouth two (2) times a week. 'On Tuesdays and Thursdays or Fridays.'    Provider, Historical  
 
 
REVIEW OF SYSTEMS:    
I am not able to complete the review of systems because: The patient is intubated and sedated The patient has altered mental status due to his acute medical problems The patient has baseline aphasia from prior stroke(s) The patient has baseline dementia and is not reliable historian The patient is in acute medical distress and unable to provide information Total of 12 systems reviewed as follows:   
   POSITIVE= underlined text  Negative = text not underlined General:  fever, chills, sweats, generalized weakness, weight loss/gain,  
   loss of appetite Eyes:    blurred vision, eye pain, loss of vision, double vision ENT:    rhinorrhea, pharyngitis Respiratory:   cough, sputum production, SOB, RIZO, wheezing, pleuritic pain  
Cardiology:   chest pain, palpitations, orthopnea, PND, edema, syncope Gastrointestinal:  abdominal pain , N/V, diarrhea, dysphagia, constipation, bleeding Gastrointestinal:  suprapubic abdominal pain , N/V, diarrhea, dysphagia, constipation, bleeding Genitourinary:  urinary retention, frequency, urgency, dysuria, hematuria, incontinence Muskuloskeletal :  arthralgia, myalgia, back pain Hematology:  easy bruising, nose or gum bleeding, lymphadenopathy Dermatological: rash, ulceration, pruritis, color change / jaundice Endocrine:   hot flashes or polydipsia Neurological:  headache, dizziness, confusion, focal weakness, paresthesia, Speech difficulties, memory loss, gait difficulty Psychological: Feelings of anxiety, depression, agitation Objective: VITALS:   
Visit Vitals /56 (BP 1 Location: Left arm, BP Patient Position: At rest) Pulse 89 Temp 98 °F (36.7 °C) Resp 18 Ht 5' 4\" (1.626 m) Wt 91.7 kg (202 lb 2.6 oz) SpO2 94% BMI 34.70 kg/m² PHYSICAL EXAM: 
 
General:    Alert, cooperative, no distress, appears stated age. HEENT: Atraumatic, anicteric sclerae, pink conjunctivae No oral ulcers, mucosa moist, throat clear, dentition fair Neck:  Supple, symmetrical,  thyroid: non tender Lungs:   Clear to auscultation bilaterally. No Wheezing or Rhonchi. No rales. Chest wall:  No tenderness  No Accessory muscle use. Heart:   Regular  rhythm,  No  murmur   No edema Abdomen:   Soft, suprapubic tenderness. Not distended. Bowel sounds normal 
Extremities: No cyanosis. No clubbing,   
  Skin turgor normal, Capillary refill normal, Radial dial pulse 2+ Skin:     Not pale. Not Jaundiced  No rashes Psych:  Good insight. Not depressed. Not anxious or agitated. Neurologic: EOMs intact. No facial asymmetry. No aphasia or slurred speech. Symmetrical strength, Sensation grossly intact. Alert and oriented X 4.  
 
_______________________________________________________________________ Care Plan discussed with: 
  Comments Patient y Family RN y   
Care Manager Consultant:  lara ED physician  
_______________________________________________________________________ Expected  Disposition:  
Home with Family y HH/PT/OT/RN   
SNF/LTC   
HOA   
________________________________________________________________________ TOTAL TIME: 60 Minutes Critical Care Provided     Minutes non procedure based Comments  
 y Reviewed previous records  
>50% of visit spent in counseling and coordination of care y Discussion with patient and questions answered 
  
 
________________________________________________________________________ Signed: April Macario MD 
 
Procedures: see electronic medical records for all procedures/Xrays and details which were not copied into this note but were reviewed prior to creation of Plan. LAB DATA REVIEWED:   
Recent Results (from the past 24 hour(s)) URINALYSIS W/ REFLEX CULTURE Collection Time: 03/19/19  3:38 PM  
Result Value Ref Range Color YELLOW/STRAW Appearance CLEAR CLEAR Specific gravity 1.019 1.003 - 1.030    
 pH (UA) 6.5 5.0 - 8.0 Protein NEGATIVE  NEG mg/dL Glucose NEGATIVE  NEG mg/dL Ketone NEGATIVE  NEG mg/dL Bilirubin NEGATIVE  NEG Blood NEGATIVE  NEG Urobilinogen 0.2 0.2 - 1.0 EU/dL Nitrites NEGATIVE  NEG Leukocyte Esterase MODERATE (A) NEG    
 WBC 20-50 0 - 4 /hpf  
 RBC 0-5 0 - 5 /hpf Epithelial cells FEW FEW /lpf Bacteria 4+ (A) NEG /hpf  
 UA:UC IF INDICATED URINE CULTURE ORDERED (A) CNI Hyaline cast 0-2 0 - 5 /lpf  
CBC WITH AUTOMATED DIFF Collection Time: 03/19/19  3:56 PM  
Result Value Ref Range WBC 6.7 3.6 - 11.0 K/uL  
 RBC 4.33 3.80 - 5.20 M/uL  
 HGB 13.1 11.5 - 16.0 g/dL HCT 41.4 35.0 - 47.0 % MCV 95.6 80.0 - 99.0 FL  
 MCH 30.3 26.0 - 34.0 PG  
 MCHC 31.6 30.0 - 36.5 g/dL  
 RDW 12.7 11.5 - 14.5 % PLATELET 786 309 - 610 K/uL MPV 10.0 8.9 - 12.9 FL  
 NRBC 0.0 0  WBC ABSOLUTE NRBC 0.00 0.00 - 0.01 K/uL NEUTROPHILS 56 32 - 75 % LYMPHOCYTES 33 12 - 49 % MONOCYTES 8 5 - 13 % EOSINOPHILS 3 0 - 7 % BASOPHILS 0 0 - 1 % IMMATURE GRANULOCYTES 0 0.0 - 0.5 % ABS. NEUTROPHILS 3.8 1.8 - 8.0 K/UL  
 ABS. LYMPHOCYTES 2.2 0.8 - 3.5 K/UL  
 ABS. MONOCYTES 0.6 0.0 - 1.0 K/UL  
 ABS. EOSINOPHILS 0.2 0.0 - 0.4 K/UL  
 ABS. BASOPHILS 0.0 0.0 - 0.1 K/UL  
 ABS. IMM. GRANS. 0.0 0.00 - 0.04 K/UL  
 DF AUTOMATED METABOLIC PANEL, COMPREHENSIVE Collection Time: 03/19/19  3:56 PM  
Result Value Ref Range Sodium 139 136 - 145 mmol/L Potassium 4.4 3.5 - 5.1 mmol/L Chloride 107 97 - 108 mmol/L  
 CO2 27 21 - 32 mmol/L Anion gap 5 5 - 15 mmol/L Glucose 92 65 - 100 mg/dL BUN 22 (H) 6 - 20 MG/DL Creatinine 0.89 0.55 - 1.02 MG/DL  
 BUN/Creatinine ratio 25 (H) 12 - 20 GFR est AA >60 >60 ml/min/1.73m2 GFR est non-AA >60 >60 ml/min/1.73m2 Calcium 8.6 8.5 - 10.1 MG/DL Bilirubin, total 0.1 (L) 0.2 - 1.0 MG/DL  
 ALT (SGPT) 32 12 - 78 U/L  
 AST (SGOT) 26 15 - 37 U/L Alk. phosphatase 93 45 - 117 U/L Protein, total 7.2 6.4 - 8.2 g/dL Albumin 3.8 3.5 - 5.0 g/dL Globulin 3.4 2.0 - 4.0 g/dL A-G Ratio 1.1 1.1 - 2.2 LACTIC ACID Collection Time: 03/19/19  9:10 PM  
Result Value Ref Range Lactic acid 0.6 0.4 - 2.0 MMOL/L  
CBC W/O DIFF Collection Time: 03/20/19  4:18 AM  
Result Value Ref Range WBC 4.9 3.6 - 11.0 K/uL  
 RBC 3.79 (L) 3.80 - 5.20 M/uL  
 HGB 11.6 11.5 - 16.0 g/dL HCT 35.9 35.0 - 47.0 % MCV 94.7 80.0 - 99.0 FL  
 MCH 30.6 26.0 - 34.0 PG  
 MCHC 32.3 30.0 - 36.5 g/dL  
 RDW 13.0 11.5 - 14.5 % PLATELET 106 577 - 172 K/uL MPV 9.8 8.9 - 12.9 FL  
 NRBC 0.0 0  WBC ABSOLUTE NRBC 0.00 0.00 - 0.01 K/uL

## 2019-03-20 NOTE — PROGRESS NOTES
Reviewed Chart, and seen pt, See H & P for Full details including A &P. S- c/o b/l flank,back pain, Abdominal pain. Wants Oxycodone for pain, Norco and Tylenol makes her nauseous. O- T 97.6, Hr 81, ,60, RR WNL. Abdomen soft, Minimal generalized tender, No CVA tenderness. -CT abd W contrast - No acute findings.  
 
-Urine culture- positive for gram Negative rods WBC normal. 
 
Plan- Currently on Zosyn, will continue it. Will get ID opinion regarding recurrent ? UTI and treatment guidance. Avoid Narcotics.

## 2019-03-20 NOTE — CONSULTS
Urogyn consult note dictated. Pt to be admitted for IV Ab (Zosyn) for recur UTI with mult resist. Would recommend only Cath urine specimen in future as clean catch urine with prolapse often contam with vaginal ramírez. Will see her in office for cath urine test of cure after discharge. She has been seeing Dr Richie Rhodes (urogyn) and planning prolapse repair. She has appt with Dr Soraya Castro 4/8. Secondly would D/C on Methenamine Hippurate 1000 grm BID to inhibit bacterial growth without developing more resistance.

## 2019-03-20 NOTE — PROGRESS NOTES
Oncology End of Shift Note Bedside shift change report given to Ignacia Hurt RN (incoming nurse) by Bob Howell RN (outgoing nurse) on Ross Juliana. Report included the following information SBAR, Kardex, MAR and Recent Results. Shift Summary: Patient came from the ED. Patient self caths herself. Issues for Physician to Address:  none Patient on Cardiac Monitoring? [] Yes 
[x] No 
 
Rhythm:   
 
 
 
Shift Events Patient arrived from the ED. Patient self caths herself.  
 
 
Bob Howell RN

## 2019-03-20 NOTE — CONSULTS
[facility]    Name:  Flori Lopes  MR#:  [id number]  :  1970  ACCOUNT #:  [de-identified]  DATE OF SERVICE:  2019    REASON FOR CONSULTATION:  Pelvic relaxation, secondary is recurrent UTI. PRESENTATION:  The patient is a 20-year-old lady who has been admitted several times for recurrent UTI with multiply resistant bacteria. Her pertinent history includes cystocele repair twice with Dr. Hi Bach, 1 time with mesh. She does self cath to help empty her bladder completely. She has had a vaginal hysterectomy remotely for a prolapse as well. She has had a gastric bypass and tummy tuck in . PAST MEDICAL HISTORY:  Includes history of sarcoidosis and substance abuse. ALLERGIES:  SHE HAS NO KNOWN ALLERGIES. She is in the emergency room with flank pain and a clean-catch urine has been obtained, showing wbc's of 20-50 and 4+bacteria. She has been started on IV Zosyn in the emergency room and planned to be admitted. She is afebrile. LABORATORY DATA:  White count is 6.7, hemoglobin 13.1. CAT scan done  is negative for any urologic reasons for recurrent UTI. She has been seeing Elmer King, a urogynecologist in practice with Dr. Hi Bach, and has an appointment with Dr. April Munoz on . Dr. Katarzyna Farley plans reconstructive surgery for her prolapse. IMPRESSION AND PLAN:  This hospitalization is to always obtain a cath specimen for her as voiding over prolapse is without doubt going to be a contaminated specimen. I would add methenamine hippurate 1 g b.i.d. after treatment of this urinary tract infection to help minimize her recurrent UTI. I will be glad to see her in followup at my office here to obtain a cath urine after treatment in-house to assure that the infection has cleared. She will also then follow up with Dr. April Munoz as previously arranged.   I have given her my business card and asked her to come to see me 3 or 4 days after ending antibiotic treatment so that I can get a cath specimen for culture and sensitivity to assure complete treatment.         Cameron Du MD      [id number]  D:  03/20/2019 11:49  T:  03/20/2019 13:47  JOB #:  [id number]

## 2019-03-20 NOTE — PROGRESS NOTES
Discussed changes with Dr. Jey Fischer. Based on discussion, will change Wellbutrin, Trazodone, and add gabapentin.

## 2019-03-20 NOTE — PROGRESS NOTES
Pharmacy Medication Reconciliation The patient was interviewed regarding current PTA medication list, use and drug allergies;  patient present in room and obtained permission from patient to discuss drug regimen with visitor(s) present. The patient was questioned regarding use of any other inhalers, topical products, over the counter medications, herbal medications, vitamin products or ophthalmic/nasal/otic medication use. Allergy Update: none Recommendations/Findings: The following amendments were made to the patient's active medication list on file at [address]:  
1) Additions: Lasix 20mg PO daily PRN edema, gabapentin 300mg PO tid, liquid copper 2mg PO daily 2) Deletions: Bactrim, Flagyl 3) Changes: Wellbutrin and Trazodone 
 
 
-Clarified PTA med list with patient. PTA medication list was corrected to the following:  
 
Prior to Admission Medications Prescriptions Last Dose Informant Patient Reported? Taking? ASPIRIN/SALICYLAMIDE/CAFFEINE (BC HEADACHE POWDER PO)  Self Yes Yes Sig: Take 1 Packet by mouth daily as needed (headache). COPPER PO  Self Yes Yes Sig: Take 2 mg by mouth daily. Pt gets a liquid from a specialty pharmacy FLUoxetine (PROZAC) 40 mg capsule  Self Yes Yes Sig: Take 40 mg by mouth daily. buPROPion XL (WELLBUTRIN XL) 150 mg tablet  Self Yes Yes Sig: Take 150 mg by mouth daily. butalbital-acetaminophen-caffeine (FIORICET, ESGIC) -40 mg per tablet  Self Yes Yes Sig: Take 2 Tabs by mouth every four (4) hours as needed for Headache.  
cyanocobalamin (VITAMIN B12) 1,000 mcg/mL injection 2019 Self Yes Yes Si,000 mcg by IntraMUSCular route every twenty-eight (28) days. ergocalciferol (VITAMIN D2) 50,000 unit capsule  Self Yes Yes Sig: Take 50,000 Units by mouth every Tuesday and Friday. furosemide (LASIX) 20 mg tablet  Self Yes Yes Sig: Take 20 mg by mouth daily as needed for Other. Prn edema gabapentin (NEURONTIN) 300 mg capsule  Self Yes Yes Sig: Take 300 mg by mouth three (3) times daily. levothyroxine (SYNTHROID) 50 mcg tablet  Self Yes Yes Sig: Take 50 mcg by mouth Daily (before breakfast). nebivolol (BYSTOLIC) 5 mg tablet  Self Yes Yes Sig: Take 5 mg by mouth daily as needed (HR > 140). topiramate (TOPAMAX) 50 mg tablet  Self Yes Yes Sig: Take 50 mg by mouth two (2) times a day. traZODone (DESYREL) 150 mg tablet  Self Yes Yes Sig: Take 150 mg by mouth nightly as needed for Insomnia. Facility-Administered Medications: None Will contact MD regarding changes. Thank you, Duy Parsons, PHARMD

## 2019-03-21 VITALS
TEMPERATURE: 98.6 F | DIASTOLIC BLOOD PRESSURE: 71 MMHG | HEIGHT: 64 IN | BODY MASS INDEX: 34.51 KG/M2 | SYSTOLIC BLOOD PRESSURE: 112 MMHG | WEIGHT: 202.16 LBS | RESPIRATION RATE: 16 BRPM | HEART RATE: 87 BPM | OXYGEN SATURATION: 97 %

## 2019-03-21 LAB
ANION GAP SERPL CALC-SCNC: 6 MMOL/L (ref 5–15)
BACTERIA SPEC CULT: ABNORMAL
BASOPHILS # BLD: 0 K/UL (ref 0–0.1)
BASOPHILS NFR BLD: 0 % (ref 0–1)
BUN SERPL-MCNC: 14 MG/DL (ref 6–20)
BUN/CREAT SERPL: 29 (ref 12–20)
CALCIUM SERPL-MCNC: 8.2 MG/DL (ref 8.5–10.1)
CC UR VC: ABNORMAL
CHLORIDE SERPL-SCNC: 114 MMOL/L (ref 97–108)
CO2 SERPL-SCNC: 24 MMOL/L (ref 21–32)
CREAT SERPL-MCNC: 0.49 MG/DL (ref 0.55–1.02)
DIFFERENTIAL METHOD BLD: ABNORMAL
EOSINOPHIL # BLD: 0.2 K/UL (ref 0–0.4)
EOSINOPHIL NFR BLD: 5 % (ref 0–7)
ERYTHROCYTE [DISTWIDTH] IN BLOOD BY AUTOMATED COUNT: 12.6 % (ref 11.5–14.5)
GLUCOSE SERPL-MCNC: 93 MG/DL (ref 65–100)
HCT VFR BLD AUTO: 39.8 % (ref 35–47)
HGB BLD-MCNC: 12.4 G/DL (ref 11.5–16)
IMM GRANULOCYTES # BLD AUTO: 0 K/UL (ref 0–0.04)
IMM GRANULOCYTES NFR BLD AUTO: 0 % (ref 0–0.5)
LYMPHOCYTES # BLD: 1 K/UL (ref 0.8–3.5)
LYMPHOCYTES NFR BLD: 29 % (ref 12–49)
MCH RBC QN AUTO: 30.2 PG (ref 26–34)
MCHC RBC AUTO-ENTMCNC: 31.2 G/DL (ref 30–36.5)
MCV RBC AUTO: 96.8 FL (ref 80–99)
MONOCYTES # BLD: 0.4 K/UL (ref 0–1)
MONOCYTES NFR BLD: 11 % (ref 5–13)
NEUTS SEG # BLD: 1.9 K/UL (ref 1.8–8)
NEUTS SEG NFR BLD: 55 % (ref 32–75)
NRBC # BLD: 0 K/UL (ref 0–0.01)
NRBC BLD-RTO: 0 PER 100 WBC
PLATELET # BLD AUTO: 158 K/UL (ref 150–400)
PMV BLD AUTO: 9.9 FL (ref 8.9–12.9)
POTASSIUM SERPL-SCNC: 4.2 MMOL/L (ref 3.5–5.1)
RBC # BLD AUTO: 4.11 M/UL (ref 3.8–5.2)
SERVICE CMNT-IMP: ABNORMAL
SODIUM SERPL-SCNC: 144 MMOL/L (ref 136–145)
WBC # BLD AUTO: 3.4 K/UL (ref 3.6–11)

## 2019-03-21 PROCEDURE — 74011250636 HC RX REV CODE- 250/636: Performed by: EMERGENCY MEDICINE

## 2019-03-21 PROCEDURE — 74011250637 HC RX REV CODE- 250/637: Performed by: INTERNAL MEDICINE

## 2019-03-21 PROCEDURE — 74011250636 HC RX REV CODE- 250/636: Performed by: INTERNAL MEDICINE

## 2019-03-21 PROCEDURE — 74011000258 HC RX REV CODE- 258: Performed by: INTERNAL MEDICINE

## 2019-03-21 PROCEDURE — 74011000258 HC RX REV CODE- 258: Performed by: EMERGENCY MEDICINE

## 2019-03-21 PROCEDURE — 80048 BASIC METABOLIC PNL TOTAL CA: CPT

## 2019-03-21 PROCEDURE — 85025 COMPLETE CBC W/AUTO DIFF WBC: CPT

## 2019-03-21 PROCEDURE — 36415 COLL VENOUS BLD VENIPUNCTURE: CPT

## 2019-03-21 RX ORDER — METHENAMINE HIPPURATE 1000 MG/1
1 TABLET ORAL 2 TIMES DAILY WITH MEALS
Qty: 30 TAB | Refills: 0 | Status: ON HOLD | OUTPATIENT
Start: 2019-03-21 | End: 2019-05-06

## 2019-03-21 RX ORDER — CEFUROXIME AXETIL 500 MG/1
250 TABLET ORAL 2 TIMES DAILY
Qty: 7 TAB | Refills: 0 | Status: SHIPPED | OUTPATIENT
Start: 2019-03-21 | End: 2019-03-28

## 2019-03-21 RX ADMIN — TRAZODONE HYDROCHLORIDE 150 MG: 100 TABLET ORAL at 00:11

## 2019-03-21 RX ADMIN — BUPROPION HYDROCHLORIDE 150 MG: 150 TABLET, FILM COATED, EXTENDED RELEASE ORAL at 08:40

## 2019-03-21 RX ADMIN — GABAPENTIN 300 MG: 300 CAPSULE ORAL at 08:40

## 2019-03-21 RX ADMIN — TOPIRAMATE 50 MG: 25 TABLET, FILM COATED ORAL at 08:40

## 2019-03-21 RX ADMIN — PIPERACILLIN AND TAZOBACTAM 3.38 G: 3; .375 INJECTION, POWDER, FOR SOLUTION INTRAVENOUS at 12:11

## 2019-03-21 RX ADMIN — LEVOTHYROXINE SODIUM 50 MCG: 50 TABLET ORAL at 06:47

## 2019-03-21 RX ADMIN — CEFTRIAXONE 1 G: 1 INJECTION, POWDER, FOR SOLUTION INTRAMUSCULAR; INTRAVENOUS at 15:58

## 2019-03-21 RX ADMIN — TRAMADOL HYDROCHLORIDE 50 MG: 50 TABLET, COATED ORAL at 12:28

## 2019-03-21 RX ADMIN — FUROSEMIDE 20 MG: 40 TABLET ORAL at 08:40

## 2019-03-21 RX ADMIN — ENOXAPARIN SODIUM 40 MG: 40 INJECTION SUBCUTANEOUS at 08:40

## 2019-03-21 RX ADMIN — GABAPENTIN 300 MG: 300 CAPSULE ORAL at 15:58

## 2019-03-21 RX ADMIN — FLUOXETINE 40 MG: 20 CAPSULE ORAL at 08:40

## 2019-03-21 RX ADMIN — BUTALBITAL, ACETAMINOPHEN AND CAFFEINE 1 TABLET: 50; 325; 40 TABLET ORAL at 06:47

## 2019-03-21 RX ADMIN — ACETAMINOPHEN 650 MG: 325 TABLET ORAL at 12:28

## 2019-03-21 RX ADMIN — TRAMADOL HYDROCHLORIDE 50 MG: 50 TABLET, COATED ORAL at 00:11

## 2019-03-21 RX ADMIN — PIPERACILLIN AND TAZOBACTAM 3.38 G: 3; .375 INJECTION, POWDER, FOR SOLUTION INTRAVENOUS at 04:23

## 2019-03-21 NOTE — PROGRESS NOTES
Pt educated on discharge instructions, prescriptions, and follow-up appointments. Pt verbalized understanding. Pt ambulated to 's vehicle without  incident.

## 2019-03-21 NOTE — PROGRESS NOTES
Hospitalist Progress Note NAME: George Harris :  1970 MRN:  [id number] Assessment / Plan: UTI POA with h/o VRE and h/o straight caths Pelvic-organ Prolapse (reported Bladder, Vagina and rectum) Suspect likely could be colonization as without fever, leukocytosis. Symptoms likely related to Vesicorectal prolapse with h/o failed multiple non surgical treatments She is been frustrated and developed depression/anxiety and other medical issues with delay in surgery as per patient and has been required recurrent ED visits 
-Continue Zosyn until seen by ID 
-urology recommended outpatient follow up. Treat Pain with Tylenol and Tramadol, AVOID nacrotics, okay with Fioricet CT A/P without acute changes in ED 
Q6H and PRN strightcaths f/u urine cultures- Positive for gram negative rods 
  
Anxiety/Depression Continue Psych meds 
 
h/o Sarcodosis per patient 
  
Hypothyroidism Continue synthroid 
 
  
Migraine Headaches Continue Px meds PRN Fioricet 30.0 - 39.9 Obese / Body mass index is 34.7 kg/m². Code status: Full Prophylaxis: Lovenox Recommended Disposition: Home w/Family Subjective: Chief Complaint / Reason for Physician Visit Reports improvement in her back pain, still mild discomfirt in back and abdomen. Review of Systems: 
Symptom Y/N Comments  Symptom Y/N Comments Fever/Chills n   Chest Pain n   
Poor Appetite n   Edema n   
Cough n   Abdominal Pain y Sputum n   Joint Pain SOB/RIZO n   Pruritis/Rash Nausea/vomit    Tolerating PT/OT Diarrhea    Tolerating Diet Constipation    Other Could NOT obtain due to:   
 
Objective: VITALS:  
Last 24hrs VS reviewed since prior progress note. Most recent are: 
Patient Vitals for the past 24 hrs: 
 Temp Pulse Resp BP SpO2  
19 0747 98.6 °F (37 °C) 88 16 114/67 96 % 19 2356 97.5 °F (36.4 °C) 86 16 111/71 94 % 19 1917 98.2 °F (36.8 °C) 87 16 123/73 98 % 03/20/19 1607 98.8 °F (37.1 °C) 84 16 120/66 99 % 03/20/19 1523     98 % 03/20/19 1522 97.9 °F (36.6 °C)      
03/20/19 1521    117/60  No intake or output data in the 24 hours ending 03/21/19 1045 PHYSICAL EXAM: 
General: WD, WN. Alert, cooperative, no acute distress   
EENT:  EOMI. Anicteric sclerae. MMM Resp:  CTA bilaterally, no wheezing or rales. No accessory muscle use CV:  Regular  rhythm,  No edema GI:  Soft, Non distended, mild generalized tender Neurologic:  Alert and oriented X 3, normal speech, Psych:   fair insight. Not anxious nor agitated Skin:  No rashes. No jaundice Reviewed most current lab test results and cultures  YES Reviewed most current radiology test results   YES Review and summation of old records today    NO Reviewed patient's current orders and MAR    YES 
PMH/SH reviewed - no change compared to H&P 
________________________________________________________________________ Care Plan discussed with: 
  Comments Patient x Family RN x Care Manager Consultant  X urology Multidiciplinary team rounds were held today with , nursing, pharmacist and clinical coordinator. Patient's plan of care was discussed; medications were reviewed and discharge planning was addressed. ________________________________________________________________________ Total NON critical care TIME:  30   Minutes Total CRITICAL CARE TIME Spent:   Minutes non procedure based Comments >50% of visit spent in counseling and coordination of care    
________________________________________________________________________ Hannah Soto MD  
 
Procedures: see electronic medical records for all procedures/Xrays and details which were not copied into this note but were reviewed prior to creation of Plan. LABS: 
I reviewed today's most current labs and imaging studies. Pertinent labs include: 
Recent Labs  
  03/21/19 8534 03/20/19 
0418 03/19/19 
1556 WBC 3.4* 4.9 6.7 HGB 12.4 11.6 13.1 HCT 39.8 35.9 41.4  181 226 Recent Labs  
  03/21/19 
0424 03/20/19 
0418 03/19/19 
1556  143 139  
K 4.2 4.0 4.4 * 112* 107 CO2 24 26 27 GLU 93 86 92 BUN 14 23* 22* CREA 0.49* 0.51* 0.89 CA 8.2* 8.1* 8.6 ALB  --   --  3.8 TBILI  --   --  0.1* SGOT  --   --  26 ALT  --   --  32 Signed: Parvez Fuentes MD

## 2019-03-21 NOTE — PROGRESS NOTES
Reason for Admission:   UTI RRAT Score:      3 Plan for utilizing home health:      [facility] services in the past.  No HH needs at this time. Likelihood of Readmission:  Low Transition of Care Plan:         Pt to discharge home w/family. Will p/u prescriptions from pharmacy. Instructed to follow up w/PCP for post hospital discharge. Pt acknowledged understanding.  
 
HUMA Dumont

## 2019-03-21 NOTE — PROGRESS NOTES
Problem: Falls - Risk of 
Goal: *Absence of Falls Description Document Mahendra Berrios Fall Risk and appropriate interventions in the flowsheet. 3/21/2019 1452 by Luciano Delatorre Outcome: Progressing Towards Goal 
3/21/2019 1025 by Luciano Delatorre Outcome: Progressing Towards Goal

## 2019-03-21 NOTE — DISCHARGE SUMMARY
Hospitalist Discharge Summary     Patient ID:  Reymundo Romero  [id number]  84 y.o.  1970    PCP on record: Nile Zepeda MD    Admit date: 3/19/2019  Discharge date and time: 3/21/2019      DISCHARGE DIAGNOSIS:      UTI POA with h/o VRE and h/o straight caths  Pelvic-organ Prolapse (reported Bladder, Vagina and rectum)  Anxiety/Depression  h/o Sarcodosis   Hypothyroidism  Migraine Headaches              CONSULTATIONS:  IP CONSULT TO HOSPITALIST  IP CONSULT TO UROLOGY    Excerpted HPI from H&P of Neymar Chirinos MD:  who presents with bilateral flank pain radiating to groin. As per patient, she has history of Pelvic Organs prolapse with prolapsing vagina, bladder and rectum. She claims is is been having constant UTIs without relief with abx. She reported chronic 10/10 pain in both side of back, sharp in nature, constant, radiating to groin. Pt denies any fever, chills, nausea, vomiting, diarrhea, chest pain, cough, shortness of breath. She has had required recurrent ED visits for same, and claims she is been seen by urologist and has had failed multiple times and getting depressed as surgery and urology appointments is been delayed. She found to have UTI in ED and noted to have history of ESBL.     We were asked to admit for work up and evaluation of the above problems. ______________________________________________________________________  DISCHARGE SUMMARY/HOSPITAL COURSE:  for full details see H&P, daily progress notes, labs, consult notes. UTI POA with h/o VRE and h/o straight caths  Pelvic-organ Prolapse (reported Bladder, Vagina and rectum)  Suspect likely could be colonization as without fever, leukocytosis.  Symptoms likely related to Vesicorectal prolapse with h/o failed multiple non surgical treatments  She is been frustrated and developed depression/anxiety and other medical issues with delay in surgery as per patient and has been required recurrent ED visits    received 2 doses of zosyn, ID was initially consulted as she had multiple ESBL, but it grew proteus so she was given one more dose of rocephin and discharged with cefuroxime for 7 days.  -she will follow with dr Sarah Manzanares for possible Uro-gyn procedure.  -also given Methenamine as per advised by dr Sidney Bose.       Anxiety/Depression  Continued Psych meds     h/o Sarcodosis per patient     Hypothyroidism  Continued synthroid        Migraine Headaches  Continue Px meds  PRN Fioricet      -no other issues while inpatient, she was medically stable to discharge home      _______________________________________________________________________  Patient seen and examined by me on discharge day. Pertinent Findings:  Gen:    Not in distress, sitting in bed. Later she was walking in hallway. Chest: Clear lungs  CVS:   Regular rhythm. No edema  Abd:  Soft, not distended,minimal abd pain. Neuro:  Alert, oriented x3.  _______________________________________________________________________  DISCHARGE MEDICATIONS:   Current Discharge Medication List      START taking these medications    Details   cefUROXime (CEFTIN) 500 mg tablet Take 0.5 Tabs by mouth two (2) times a day for 7 days. Indications: UTI  Qty: 7 Tab, Refills: 0      methenamine hippurate (HIPREX) 1 gram tablet Take 1 Tab by mouth two (2) times daily (with meals). Qty: 30 Tab, Refills: 0    Comments: Start taking from March 28,2019         CONTINUE these medications which have NOT CHANGED    Details   buPROPion XL (WELLBUTRIN XL) 150 mg tablet Take 150 mg by mouth daily. Refills: 0      butalbital-acetaminophen-caffeine (FIORICET, ESGIC) -40 mg per tablet Take 2 Tabs by mouth every four (4) hours as needed for Headache. Refills: 0      furosemide (LASIX) 20 mg tablet Take 20 mg by mouth daily as needed for Other. Prn edema  Refills: 0      gabapentin (NEURONTIN) 300 mg capsule Take 300 mg by mouth three (3) times daily.   Refills: 3      traZODone (DESYREL) 150 mg tablet Take 150 mg by mouth nightly as needed for Insomnia. Refills: 0      COPPER PO Take 2 mg by mouth daily. Pt gets a liquid from a specialty pharmacy      topiramate (TOPAMAX) 50 mg tablet Take 50 mg by mouth two (2) times a day. cyanocobalamin (VITAMIN B12) 1,000 mcg/mL injection 1,000 mcg by IntraMUSCular route every twenty-eight (28) days. FLUoxetine (PROZAC) 40 mg capsule Take 40 mg by mouth daily. ASPIRIN/SALICYLAMIDE/CAFFEINE (BC HEADACHE POWDER PO) Take 1 Packet by mouth daily as needed (headache). nebivolol (BYSTOLIC) 5 mg tablet Take 5 mg by mouth daily as needed (HR > 140). levothyroxine (SYNTHROID) 50 mcg tablet Take 50 mcg by mouth Daily (before breakfast). ergocalciferol (VITAMIN D2) 50,000 unit capsule Take 50,000 Units by mouth every Tuesday and Friday. My Recommended Diet, Activity, Wound Care, and follow-up labs are listed in the patient's Discharge Insturctions which I have personally completed and reviewed.     ______________________________________________________________________    Risk of deterioration: Low    Condition at Discharge:  Stable  ______________________________________________________________________    Disposition  Home with family, no needs  ______________________________________________________________________    Care Plan discussed with:   Patient, Family, RN, Care Manager, Consultant    ______________________________________________________________________    Code Status: Full Code  ______________________________________________________________________      Follow up with:   PCP : Donald Tay MD  Follow-up Information     Follow up With Specialties Details Why Contact Info    [address], [place], [address] [facility],[address] [facility]      Cate Hardy MD Gynecology In 2 weeks  [address]  [phone]                Total time in minutes spent coordinating this discharge (includes going over instructions, follow-up, prescriptions, and preparing report for sign off to her PCP) :  40 minutes    Signed:  Kristi May MD

## 2019-03-21 NOTE — DISCHARGE INSTRUCTIONS
HOSPITALIST DISCHARGE INSTRUCTIONS    NAME: Chris Anglin   :  1970   MRN:  [id number]     Date/Time:  3/21/2019 2:10 PM    ADMIT DATE: 3/19/2019   DISCHARGE DATE: 3/21/2019     1. UTI -proteus Mirabilis  2. Pelvic organ prolapse. 3. Anxiety  4. hypothyroidism  5. migraine    · It is important that you take the medication exactly as they are prescribed. · Keep your medication in the bottles provided by the pharmacist and keep a list of the medication names, dosages, and times to be taken in your wallet. · Do not take other medications without consulting your doctor. What to do at Home    Recommended diet:  Regular Diet    Recommended activity: Activity as tolerated      If you have questions regarding the hospital related prescriptions or hospital related issues please call [facility] at [phone]. You can always direct your questions to your primary care doctor if you are unable to reach your hospital physician; your PCP works as an extension of your hospital doctor just like your hospital doctor is an extension of your PCP for your time at the hospital [facility])    If you experience any of the following symptoms then please call your primary care physician or return to the emergency room if you cannot get hold of your doctor:    Fever, chills, nausea, vomiting, or persistent diarrhea  Worsening weakness or new problems with your speech or balance  Dark stools or visible blood in your stools  New Leg swelling or shortness of breath as these could be signs of a clot    Additional Instructions:      Bring these papers with you to your follow up appointments. The papers will help your doctors be sure to continue the care plan from the hospital.              Information obtained by :  I understand that if any problems occur once I am at home I am to contact my physician. I understand and acknowledge receipt of the instructions indicated above. Physician's or R.N.'s Signature                                                                  Date/Time                                                                                                                                              Patient or Representative Signature

## 2019-03-24 LAB
BACTERIA SPEC CULT: NORMAL
SERVICE CMNT-IMP: NORMAL

## 2019-04-01 ENCOUNTER — HOSPITAL ENCOUNTER (EMERGENCY)
Age: 49
Discharge: HOME | End: 2019-04-02
Attending: EMERGENCY MEDICINE
Payer: COMMERCIAL

## 2019-04-01 DIAGNOSIS — R30.0 DYSURIA: Primary | ICD-10-CM

## 2019-04-01 DIAGNOSIS — R10.9 FLANK PAIN: ICD-10-CM

## 2019-04-01 LAB
ALBUMIN SERPL-MCNC: 3.9 G/DL (ref 3.5–5)
ALBUMIN/GLOB SERPL: 1.2 {RATIO} (ref 1.1–2.2)
ALP SERPL-CCNC: 101 U/L (ref 45–117)
ALT SERPL-CCNC: 34 U/L (ref 12–78)
AMPHET UR QL SCN: NEGATIVE
ANION GAP SERPL CALC-SCNC: 8 MMOL/L (ref 5–15)
APPEARANCE UR: CLEAR
APPEARANCE UR: CLEAR
AST SERPL-CCNC: 22 U/L (ref 15–37)
BACTERIA URNS QL MICRO: ABNORMAL /HPF
BACTERIA URNS QL MICRO: ABNORMAL /HPF
BARBITURATES UR QL SCN: POSITIVE
BASOPHILS # BLD: 0 K/UL (ref 0–0.1)
BASOPHILS NFR BLD: 0 % (ref 0–1)
BENZODIAZ UR QL: NEGATIVE
BILIRUB SERPL-MCNC: 0.2 MG/DL (ref 0.2–1)
BILIRUB UR QL: NEGATIVE
BILIRUB UR QL: NEGATIVE
BUN SERPL-MCNC: 18 MG/DL (ref 6–20)
BUN/CREAT SERPL: 33 (ref 12–20)
CALCIUM SERPL-MCNC: 8.6 MG/DL (ref 8.5–10.1)
CANNABINOIDS UR QL SCN: NEGATIVE
CHLORIDE SERPL-SCNC: 107 MMOL/L (ref 97–108)
CO2 SERPL-SCNC: 25 MMOL/L (ref 21–32)
COCAINE UR QL SCN: NEGATIVE
COLOR UR: ABNORMAL
COLOR UR: ABNORMAL
CREAT SERPL-MCNC: 0.54 MG/DL (ref 0.55–1.02)
DIFFERENTIAL METHOD BLD: NORMAL
DRUG SCRN COMMENT,DRGCM: ABNORMAL
EOSINOPHIL # BLD: 0.2 K/UL (ref 0–0.4)
EOSINOPHIL NFR BLD: 3 % (ref 0–7)
EPITH CASTS URNS QL MICRO: ABNORMAL /LPF
EPITH CASTS URNS QL MICRO: ABNORMAL /LPF
ERYTHROCYTE [DISTWIDTH] IN BLOOD BY AUTOMATED COUNT: 12.8 % (ref 11.5–14.5)
GLOBULIN SER CALC-MCNC: 3.3 G/DL (ref 2–4)
GLUCOSE SERPL-MCNC: 92 MG/DL (ref 65–100)
GLUCOSE UR STRIP.AUTO-MCNC: NEGATIVE MG/DL
GLUCOSE UR STRIP.AUTO-MCNC: NEGATIVE MG/DL
HCT VFR BLD AUTO: 39.6 % (ref 35–47)
HGB BLD-MCNC: 13 G/DL (ref 11.5–16)
HGB UR QL STRIP: NEGATIVE
HGB UR QL STRIP: NEGATIVE
HYALINE CASTS URNS QL MICRO: ABNORMAL /LPF (ref 0–5)
HYALINE CASTS URNS QL MICRO: ABNORMAL /LPF (ref 0–5)
IMM GRANULOCYTES # BLD AUTO: 0 K/UL (ref 0–0.04)
IMM GRANULOCYTES NFR BLD AUTO: 0 % (ref 0–0.5)
KETONES UR QL STRIP.AUTO: NEGATIVE MG/DL
KETONES UR QL STRIP.AUTO: NEGATIVE MG/DL
LEUKOCYTE ESTERASE UR QL STRIP.AUTO: NEGATIVE
LEUKOCYTE ESTERASE UR QL STRIP.AUTO: NEGATIVE
LYMPHOCYTES # BLD: 1.7 K/UL (ref 0.8–3.5)
LYMPHOCYTES NFR BLD: 32 % (ref 12–49)
MCH RBC QN AUTO: 30.4 PG (ref 26–34)
MCHC RBC AUTO-ENTMCNC: 32.8 G/DL (ref 30–36.5)
MCV RBC AUTO: 92.5 FL (ref 80–99)
METHADONE UR QL: NEGATIVE
MONOCYTES # BLD: 0.4 K/UL (ref 0–1)
MONOCYTES NFR BLD: 7 % (ref 5–13)
NEUTS SEG # BLD: 3 K/UL (ref 1.8–8)
NEUTS SEG NFR BLD: 57 % (ref 32–75)
NITRITE UR QL STRIP.AUTO: POSITIVE
NITRITE UR QL STRIP.AUTO: POSITIVE
NRBC # BLD: 0 K/UL (ref 0–0.01)
NRBC BLD-RTO: 0 PER 100 WBC
OPIATES UR QL: NEGATIVE
PCP UR QL: NEGATIVE
PH UR STRIP: 6 [PH] (ref 5–8)
PH UR STRIP: 6.5 [PH] (ref 5–8)
PLATELET # BLD AUTO: 201 K/UL (ref 150–400)
PMV BLD AUTO: 9.6 FL (ref 8.9–12.9)
POTASSIUM SERPL-SCNC: 3.7 MMOL/L (ref 3.5–5.1)
PROCALCITONIN SERPL-MCNC: <0.1 NG/ML
PROT SERPL-MCNC: 7.2 G/DL (ref 6.4–8.2)
PROT UR STRIP-MCNC: NEGATIVE MG/DL
PROT UR STRIP-MCNC: NEGATIVE MG/DL
RBC # BLD AUTO: 4.28 M/UL (ref 3.8–5.2)
RBC #/AREA URNS HPF: ABNORMAL /HPF (ref 0–5)
RBC #/AREA URNS HPF: ABNORMAL /HPF (ref 0–5)
SODIUM SERPL-SCNC: 140 MMOL/L (ref 136–145)
SP GR UR REFRACTOMETRY: 1.02 (ref 1–1.03)
SP GR UR REFRACTOMETRY: 1.02 (ref 1–1.03)
UA: UC IF INDICATED,UAUC: ABNORMAL
UA: UC IF INDICATED,UAUC: ABNORMAL
UROBILINOGEN UR QL STRIP.AUTO: 0.2 EU/DL (ref 0.2–1)
UROBILINOGEN UR QL STRIP.AUTO: 0.2 EU/DL (ref 0.2–1)
WBC # BLD AUTO: 5.2 K/UL (ref 3.6–11)
WBC URNS QL MICRO: ABNORMAL /HPF (ref 0–4)
WBC URNS QL MICRO: ABNORMAL /HPF (ref 0–4)

## 2019-04-01 PROCEDURE — 87086 URINE CULTURE/COLONY COUNT: CPT

## 2019-04-01 PROCEDURE — 80307 DRUG TEST PRSMV CHEM ANLYZR: CPT

## 2019-04-01 PROCEDURE — 96375 TX/PRO/DX INJ NEW DRUG ADDON: CPT

## 2019-04-01 PROCEDURE — 80053 COMPREHEN METABOLIC PANEL: CPT

## 2019-04-01 PROCEDURE — 99283 EMERGENCY DEPT VISIT LOW MDM: CPT

## 2019-04-01 PROCEDURE — 87077 CULTURE AEROBIC IDENTIFY: CPT

## 2019-04-01 PROCEDURE — 74011250636 HC RX REV CODE- 250/636: Performed by: EMERGENCY MEDICINE

## 2019-04-01 PROCEDURE — 81001 URINALYSIS AUTO W/SCOPE: CPT

## 2019-04-01 PROCEDURE — 87186 SC STD MICRODIL/AGAR DIL: CPT

## 2019-04-01 PROCEDURE — 36415 COLL VENOUS BLD VENIPUNCTURE: CPT

## 2019-04-01 PROCEDURE — 85025 COMPLETE CBC W/AUTO DIFF WBC: CPT

## 2019-04-01 PROCEDURE — 84145 PROCALCITONIN (PCT): CPT

## 2019-04-01 RX ORDER — KETOROLAC TROMETHAMINE 30 MG/ML
30 INJECTION, SOLUTION INTRAMUSCULAR; INTRAVENOUS
Status: COMPLETED | OUTPATIENT
Start: 2019-04-01 | End: 2019-04-01

## 2019-04-01 RX ADMIN — KETOROLAC TROMETHAMINE 30 MG: 30 INJECTION, SOLUTION INTRAMUSCULAR at 22:53

## 2019-04-02 VITALS
DIASTOLIC BLOOD PRESSURE: 62 MMHG | BODY MASS INDEX: 34.78 KG/M2 | TEMPERATURE: 98.2 F | RESPIRATION RATE: 18 BRPM | WEIGHT: 203.71 LBS | HEART RATE: 86 BPM | HEIGHT: 64 IN | OXYGEN SATURATION: 98 % | SYSTOLIC BLOOD PRESSURE: 100 MMHG

## 2019-04-02 PROCEDURE — 74011000258 HC RX REV CODE- 258: Performed by: EMERGENCY MEDICINE

## 2019-04-02 PROCEDURE — 96365 THER/PROPH/DIAG IV INF INIT: CPT

## 2019-04-02 PROCEDURE — 74011250636 HC RX REV CODE- 250/636: Performed by: EMERGENCY MEDICINE

## 2019-04-02 RX ORDER — ONDANSETRON 8 MG/1
8 TABLET, ORALLY DISINTEGRATING ORAL
Qty: 12 TAB | Refills: 0 | Status: SHIPPED | OUTPATIENT
Start: 2019-04-02

## 2019-04-02 RX ORDER — OXYCODONE HYDROCHLORIDE 5 MG/1
5 TABLET ORAL
Qty: 10 TAB | Refills: 0 | Status: SHIPPED | OUTPATIENT
Start: 2019-04-02 | End: 2019-04-05

## 2019-04-02 RX ADMIN — PIPERACILLIN AND TAZOBACTAM 3.38 G: 3; .375 INJECTION, POWDER, FOR SOLUTION INTRAVENOUS at 01:41

## 2019-04-02 NOTE — PROGRESS NOTES
Pt with recent tx for recurrent UTI. Consultation by hospitalist yesterday and felt that bacteria represents colonization rather than true infection. Decision made to not treat with antibiotic at this time.

## 2019-04-02 NOTE — DISCHARGE INSTRUCTIONS
Patient Education        Painful Urination (Dysuria): Care Instructions  Your Care Instructions  Burning pain with urination (dysuria) is a common symptom of a urinary tract infection or other urinary problems. The bladder may become inflamed. This can cause pain when the bladder fills and empties. You may also feel pain if the tube that carries urine from the bladder to the outside of the body (urethra) gets irritated or infected. Sexually transmitted infections (STIs) also may cause pain when you urinate. Sometimes the pain can be caused by things other than an infection. The urethra can be irritated by soaps, perfumes, or foreign objects in the urethra. Kidney stones can cause pain when they pass through the urethra. The cause may be hard to find. You may need tests. Treatment for painful urination depends on the cause. Follow-up care is a key part of your treatment and safety. Be sure to make and go to all appointments, and call your doctor if you are having problems. It's also a good idea to know your test results and keep a list of the medicines you take. How can you care for yourself at home? · Drink extra water for the next day or two. This will help make the urine less concentrated. (If you have kidney, heart, or liver disease and have to limit fluids, talk with your doctor before you increase the amount of fluids you drink.)  · Avoid drinks that are carbonated or have caffeine. They can irritate the bladder. · Urinate often. Try to empty your bladder each time. For women:  · Urinate right after you have sex. · After going to the bathroom, wipe from front to back. · Avoid douches, bubble baths, and feminine hygiene sprays. And avoid other feminine hygiene products that have deodorants. When should you call for help? Call your doctor now or seek immediate medical care if:    · You have new symptoms, such as fever, nausea, or vomiting.     · You have new or worse symptoms of a urinary problem.  For example:  ? You have blood or pus in your urine. ? You have chills or body aches. ? It hurts worse to urinate. ? You have groin or belly pain. ? You have pain in your back just below your rib cage (the flank area).    Watch closely for changes in your health, and be sure to contact your doctor if you have any problems. Where can you learn more? Go to http://praful-monserrat.info/. Enter Y726 in the search box to learn more about \"Painful Urination (Dysuria): Care Instructions. \"  Current as of: March 20, 2018  Content Version: 11.9  © 7833-2743 [employer]. Care instructions adapted under license by [employer] (which disclaims liability or warranty for this information). If you have questions about a medical condition or this instruction, always ask your healthcare professional. [place] [address] any warranty or liability for your use of this information.

## 2019-04-02 NOTE — CONSULTS
MEDICAL CONSULTATION      Reason for consult: evaluate for admission  Consulting provider: Dr. Malka Martinez      CC: UTI      HPI: 50 y.o lady with vaginal, bladder and rectal prolapse, recurrent UTI's, who presents with concerns of a UTI. She was recently discharged from here on 3/21/19 after being admitted and treated for a proteus UTI. She reports recently completing her cefuroxime and her symptoms worsening shortly after. Her symptoms of back, bilateral flank discomfort, and urinary urgency never resolved after last admission, but worsened after she completed the cefuroxime prescription a few days ago. She denies fever, abdominal pain. She does note some nausea without vomiting. She's been using acetaminophen and ibuprofen as needed for pain, and they help sometimes but not always. We were asked to evaluate her for admission. PMH/PSH:  Past Medical History:   Diagnosis Date    Anemia     Anxiety     Arrhythmia     tachy with exertion; HAS PRN MEDS FOR THIS    Arthritis     osteoarthritis    Autoimmune disease ([address]     sarcoidosis    Bowel trouble     Burning with urination     Chronic pain     Connective tissue disorder ([address]     Depression     NOT SEVERE    Dyspepsia and other specified disorders of function of stomach     Headache     Hypoglycemia     Hypothyroid     Osteoporosis     Prolapse of female genital organs     Sarcoidosis      Past Surgical History:   Procedure Laterality Date    HX BLADDER SUSPENSION      HX CATARACT REMOVAL      HX  SECTION      X2    HX GASTRIC BYPASS  1999    HX HERNIA REPAIR      HX HYSTERECTOMY      HX OTHER SURGICAL      TUMMY [facility]    NEUROLOGICAL PROCEDURE UNLISTED  ,     back surgery        Home meds:   Prior to Admission medications    Medication Sig Start Date End Date Taking? Authorizing Provider   ondansetron (ZOFRAN ODT) 8 mg disintegrating tablet Take 1 Tab by mouth every eight (8) hours as needed for Nausea. 4/2/19  Yes Ezekiel Quigley DO   oxyCODONE IR (ROXICODONE) 5 mg immediate release tablet Take 1 Tab by mouth every six (6) hours as needed for Pain for up to 3 days. Max Daily Amount: 20 mg. 4/2/19 4/5/19 Yes Al-Saadoon, Mort Eisenmenger, MD   methenamine hippurate (HIPREX) 1 gram tablet Take 1 Tab by mouth two (2) times daily (with meals). 3/21/19   Vernadine MD Isabel   buPROPion XL (WELLBUTRIN XL) 150 mg tablet Take 150 mg by mouth daily. 2/1/19   Provider, Historical   butalbital-acetaminophen-caffeine (FIORICET, ESGIC) -40 mg per tablet Take 2 Tabs by mouth every four (4) hours as needed for Headache. 2/22/19   Provider, Historical   furosemide (LASIX) 20 mg tablet Take 20 mg by mouth daily as needed for Other. Prn edema 2/28/19   Provider, Historical   gabapentin (NEURONTIN) 300 mg capsule Take 300 mg by mouth three (3) times daily. 3/13/19   Provider, Historical   traZODone (DESYREL) 150 mg tablet Take 150 mg by mouth nightly as needed for Insomnia. 2/1/19   Provider, Historical   COPPER PO Take 2 mg by mouth daily. Pt gets a liquid from a specialty pharmacy    Provider, Historical   topiramate (TOPAMAX) 50 mg tablet Take 50 mg by mouth two (2) times a day. Other, MD Teja   cyanocobalamin (VITAMIN B12) 1,000 mcg/mL injection 1,000 mcg by IntraMUSCular route every twenty-eight (28) days. Provider, Historical   FLUoxetine (PROZAC) 40 mg capsule Take 40 mg by mouth daily. Provider, Historical   ASPIRIN/SALICYLAMIDE/CAFFEINE (BC HEADACHE POWDER PO) Take 1 Packet by mouth daily as needed (headache). Provider, Historical   nebivolol (BYSTOLIC) 5 mg tablet Take 5 mg by mouth daily as needed (HR > 140). Other, MD Teja   levothyroxine (SYNTHROID) 50 mcg tablet Take 50 mcg by mouth Daily (before breakfast). Provider, Historical   ergocalciferol (VITAMIN D2) 50,000 unit capsule Take 50,000 Units by mouth every Tuesday and Friday.     Provider, Historical       Allergies:  No Known Allergies    FH:  Family History   Problem Relation Age of Onset    Hypertension Father     Diabetes Father     Cancer Father         SKIN, \"MOLE\", AND A CANCER OVER EYE    Diabetes Paternal Aunt     Heart Disease Maternal Grandmother     Stroke Maternal Grandmother     Anemia Mother     No Known Problems Brother     Anesth Problems Other         \"REALLY BAD REACTION, I DON'T REMEMBER WHAT HAPPENED\"    No Known Problems Son     No Known Problems Daughter     No Known Problems Daughter        SH:  Social History     Tobacco Use    Smoking status: Never Smoker    Smokeless tobacco: Never Used   Substance Use Topics    Alcohol use: Yes     Alcohol/week: 0.6 oz     Types: 1 Glasses of wine per week       ROS: A comprehensive review of systems was negative except for that written in the HPI.       PHYSICAL EXAM:  Visit Vitals  /62   Pulse 86   Temp 98.2 °F (36.8 °C)   Resp 18   Ht 5' 4\" (1.626 m)   Wt 92.4 kg (203 lb 11.3 oz)   SpO2 98%   BMI 34.97 kg/m²       Gen: NAD, non-toxic  HEENT: anicteric sclerae, normal conjunctiva, oropharynx clear, MM moist  Neck: supple, trachea midline, no adenopathy  Heart: RRR, no MRG, no JVD, no peripheral edema  Lungs: CTA b/l, non-labored respirations  Abd: soft, mild b/l lower quadrant tenderness, no anel CVA tenderness (there is tenderness with palpation of the paraspinal muscles)  Extr: warm  Skin: dry, no rash  Neuro: CN II-XII grossly intact, normal speech, moves all extremities  Psych: normal mood, appropriate affect      Labs/Imaging:  Recent Results (from the past 24 hour(s))   URINALYSIS W/ REFLEX CULTURE    Collection Time: 04/01/19  7:45 PM   Result Value Ref Range    Color YELLOW/STRAW      Appearance CLEAR CLEAR      Specific gravity 1.019 1.003 - 1.030      pH (UA) 6.5 5.0 - 8.0      Protein NEGATIVE  NEG mg/dL    Glucose NEGATIVE  NEG mg/dL    Ketone NEGATIVE  NEG mg/dL    Bilirubin NEGATIVE  NEG      Blood NEGATIVE  NEG      Urobilinogen 0.2 0.2 - 1.0 EU/dL    Nitrites POSITIVE (A) NEG      Leukocyte Esterase NEGATIVE  NEG      WBC 0-4 0 - 4 /hpf    RBC 0-5 0 - 5 /hpf    Epithelial cells FEW FEW /lpf    Bacteria 4+ (A) NEG /hpf    UA:UC IF INDICATED URINE CULTURE ORDERED (A) CNI      Hyaline cast 0-2 0 - 5 /lpf   URINALYSIS W/ REFLEX CULTURE    Collection Time: 04/01/19  9:58 PM   Result Value Ref Range    Color YELLOW/STRAW      Appearance CLEAR CLEAR      Specific gravity 1.020 1.003 - 1.030      pH (UA) 6.0 5.0 - 8.0      Protein NEGATIVE  NEG mg/dL    Glucose NEGATIVE  NEG mg/dL    Ketone NEGATIVE  NEG mg/dL    Bilirubin NEGATIVE  NEG      Blood NEGATIVE  NEG      Urobilinogen 0.2 0.2 - 1.0 EU/dL    Nitrites POSITIVE (A) NEG      Leukocyte Esterase NEGATIVE  NEG      WBC 0-4 0 - 4 /hpf    RBC 0-5 0 - 5 /hpf    Epithelial cells FEW FEW /lpf    Bacteria 4+ (A) NEG /hpf    UA:UC IF INDICATED URINE CULTURE ORDERED (A) CNI      Hyaline cast 0-2 0 - 5 /lpf   DRUG SCREEN, URINE    Collection Time: 04/01/19  9:58 PM   Result Value Ref Range    AMPHETAMINES NEGATIVE  NEG      BARBITURATES POSITIVE (A) NEG      BENZODIAZEPINES NEGATIVE  NEG      COCAINE NEGATIVE  NEG      METHADONE NEGATIVE  NEG      OPIATES NEGATIVE  NEG      PCP(PHENCYCLIDINE) NEGATIVE  NEG      THC (TH-CANNABINOL) NEGATIVE  NEG      Drug screen comment (NOTE)    CBC WITH AUTOMATED DIFF    Collection Time: 04/01/19 10:45 PM   Result Value Ref Range    WBC 5.2 3.6 - 11.0 K/uL    RBC 4.28 3.80 - 5.20 M/uL    HGB 13.0 11.5 - 16.0 g/dL    HCT 39.6 35.0 - 47.0 %    MCV 92.5 80.0 - 99.0 FL    MCH 30.4 26.0 - 34.0 PG    MCHC 32.8 30.0 - 36.5 g/dL    RDW 12.8 11.5 - 14.5 %    PLATELET 766 423 - 142 K/uL    MPV 9.6 8.9 - 12.9 FL    NRBC 0.0 0  WBC    ABSOLUTE NRBC 0.00 0.00 - 0.01 K/uL    NEUTROPHILS 57 32 - 75 %    LYMPHOCYTES 32 12 - 49 %    MONOCYTES 7 5 - 13 %    EOSINOPHILS 3 0 - 7 %    BASOPHILS 0 0 - 1 %    IMMATURE GRANULOCYTES 0 0.0 - 0.5 %    ABS. NEUTROPHILS 3.0 1.8 - 8.0 K/UL    ABS.  LYMPHOCYTES 1.7 0.8 - 3.5 K/UL    ABS. MONOCYTES 0.4 0.0 - 1.0 K/UL    ABS. EOSINOPHILS 0.2 0.0 - 0.4 K/UL    ABS. BASOPHILS 0.0 0.0 - 0.1 K/UL    ABS. IMM. GRANS. 0.0 0.00 - 0.04 K/UL    DF AUTOMATED     METABOLIC PANEL, COMPREHENSIVE    Collection Time: 04/01/19 10:45 PM   Result Value Ref Range    Sodium 140 136 - 145 mmol/L    Potassium 3.7 3.5 - 5.1 mmol/L    Chloride 107 97 - 108 mmol/L    CO2 25 21 - 32 mmol/L    Anion gap 8 5 - 15 mmol/L    Glucose 92 65 - 100 mg/dL    BUN 18 6 - 20 MG/DL    Creatinine 0.54 (L) 0.55 - 1.02 MG/DL    BUN/Creatinine ratio 33 (H) 12 - 20      GFR est AA >60 >60 ml/min/1.73m2    GFR est non-AA >60 >60 ml/min/1.73m2    Calcium 8.6 8.5 - 10.1 MG/DL    Bilirubin, total 0.2 0.2 - 1.0 MG/DL    ALT (SGPT) 34 12 - 78 U/L    AST (SGOT) 22 15 - 37 U/L    Alk. phosphatase 101 45 - 117 U/L    Protein, total 7.2 6.4 - 8.2 g/dL    Albumin 3.9 3.5 - 5.0 g/dL    Globulin 3.3 2.0 - 4.0 g/dL    A-G Ratio 1.2 1.1 - 2.2     PROCALCITONIN    Collection Time: 04/01/19 10:45 PM   Result Value Ref Range    Procalcitonin <0.1 ng/mL       Recent Labs     04/01/19  2245   WBC 5.2   HGB 13.0   HCT 39.6        Recent Labs     04/01/19  2245      K 3.7      CO2 25   BUN 18   CREA 0.54*   GLU 92   CA 8.6     Recent Labs     04/01/19  2245   SGOT 22   ALT 34      TBILI 0.2   TP 7.2   ALB 3.9   GLOB 3.3       No results for input(s): CPK, CKNDX, TROIQ in the last 72 hours. No lab exists for component: CPKMB    No results for input(s): INR, PTP, APTT in the last 72 hours. No lab exists for component: INREXT     No results for input(s): PH, PCO2, PO2 in the last 72 hours. Assessment & Plan:     Although she has s/s of a UTI, her urine does not have any pyuria. No fever, leukocytosis, or other SIRS. She appears well. I discussed with her my concerns of treating her with antibiotics without a true infection, and the potential for promoting drug-resistant infections.  I offered her an observation stay with to see if she develops any other infectious s/s, at which time a urinalysis could be re-checked. She would rather go home and follow-up with her PCP and urologist. I think it is best to do this and hold off on additional antibiotics unless she develops worsening symptoms. She will follow-up with her PCP and if her symptoms persist or worsen, a UA can be re-checked. There is a urine culture in the ED and this is likely to be positive as she is likely chronically colonized due to her pelvic pathology. I provided her with a small supply of oxycodone for pain not improved by acetaminophen and ibuprofen.       Signed By: Fela Cadet MD     April 2, 2019

## 2019-04-02 NOTE — ED PROVIDER NOTES
EMERGENCY DEPARTMENT HISTORY AND PHYSICAL EXAM 
 
 
Date: 4/1/2019 Patient Name: Baljeet Griggs History of Presenting Illness Chief Complaint Patient presents with  Bladder Infection She was on cefuroxime and is on methenamine for a UTI that is not helping. As soon as she finished the first antibiotic the burning and chills came back. History Provided By: Patient HPI: Baljeet Griggs, 50 y.o. female, with a past medical history significant for bladder prolapse, recurrent UTI, ESBL, anxiety/depression, substance abuse per the medical record presenting the emergency department complaining of bilateral flank pain, dysuria for the past 4 days. Patient reports symptoms started on Thursday when she finished her last dose of antibiotic. She reports foul-smelling and painful burning with urination. Also complains of some loose stools that started after the antibiotics were finished. Admits to chills, but no fever. Denies any exacerbating or relieving factors associated with her pain. Does report pain in the bilateral flanks. No radiation. She has not tried anything for pain at home. She is supposed to follow-up with OB/GYN urology on Monday the eighth. PCP: Jud Duenas MD 
 
No current facility-administered medications on file prior to encounter. Current Outpatient Medications on File Prior to Encounter Medication Sig Dispense Refill  methenamine hippurate (HIPREX) 1 gram tablet Take 1 Tab by mouth two (2) times daily (with meals). 30 Tab 0  
 buPROPion XL (WELLBUTRIN XL) 150 mg tablet Take 150 mg by mouth daily. 0  
 butalbital-acetaminophen-caffeine (FIORICET, ESGIC) -40 mg per tablet Take 2 Tabs by mouth every four (4) hours as needed for Headache.  0  
 furosemide (LASIX) 20 mg tablet Take 20 mg by mouth daily as needed for Other. Prn edema  0  
 gabapentin (NEURONTIN) 300 mg capsule Take 300 mg by mouth three (3) times daily.   3  
  traZODone (DESYREL) 150 mg tablet Take 150 mg by mouth nightly as needed for Insomnia.  0  
 COPPER PO Take 2 mg by mouth daily. Pt gets a liquid from a specialty pharmacy  topiramate (TOPAMAX) 50 mg tablet Take 50 mg by mouth two (2) times a day.  cyanocobalamin (VITAMIN B12) 1,000 mcg/mL injection 1,000 mcg by IntraMUSCular route every twenty-eight (28) days.  FLUoxetine (PROZAC) 40 mg capsule Take 40 mg by mouth daily.  ASPIRIN/SALICYLAMIDE/CAFFEINE (BC HEADACHE POWDER PO) Take 1 Packet by mouth daily as needed (headache).  nebivolol (BYSTOLIC) 5 mg tablet Take 5 mg by mouth daily as needed (HR > 140).  levothyroxine (SYNTHROID) 50 mcg tablet Take 50 mcg by mouth Daily (before breakfast).  ergocalciferol (VITAMIN D2) 50,000 unit capsule Take 50,000 Units by mouth every Tuesday and Friday. Past History Past Medical History: 
Past Medical History:  
Diagnosis Date  Anemia  Anxiety  Arrhythmia   
 tachy with exertion; HAS PRN MEDS FOR THIS  
 Arthritis   
 osteoarthritis  Autoimmune disease ([address]   
 sarcoidosis  Bowel trouble  Burning with urination  Chronic pain  Connective tissue disorder ([address]  Depression NOT SEVERE  Dyspepsia and other specified disorders of function of stomach   
 Headache  Hypoglycemia  Hypothyroid  Osteoporosis  Prolapse of female genital organs  Sarcoidosis Past Surgical History: 
Past Surgical History:  
Procedure Laterality Date  HX BLADDER SUSPENSION    
 HX CATARACT REMOVAL    HX  SECTION X2  
 HX GASTRIC BYPASS  1999  HX HERNIA REPAIR    
 HX HYSTERECTOMY    HX OTHER SURGICAL    
 TUMMY TUCK  
 NEUROLOGICAL PROCEDURE UNLISTED  ,   
 back surgery Family History: 
Family History Problem Relation Age of Onset  Hypertension Father  Diabetes Father  Cancer Father SKIN, \"MOLE\", AND A CANCER OVER EYE  Diabetes Paternal Aunt  Heart Disease Maternal Grandmother  Stroke Maternal Grandmother  Anemia Mother  No Known Problems Brother  Anesth Problems Other \"REALLY BAD REACTION, I DON'T REMEMBER WHAT HAPPENED\"  No Known Problems Son  No Known Problems Daughter  No Known Problems Daughter Social History: 
Social History Tobacco Use  Smoking status: Never Smoker  Smokeless tobacco: Never Used Substance Use Topics  Alcohol use: Yes Alcohol/week: 0.6 oz Types: 1 Glasses of wine per week  Drug use: No  
 
 
Allergies: 
No Known Allergies Review of Systems Review of Systems Constitutional: Positive for chills. Negative for fever. HENT: Negative for congestion and sore throat. Eyes: Negative for visual disturbance. Respiratory: Positive for shortness of breath. Negative for cough. Cardiovascular: Negative for chest pain and leg swelling. Gastrointestinal: Positive for abdominal pain and diarrhea. Negative for blood in stool and nausea. Endocrine: Negative for polyuria. Genitourinary: Negative for dysuria, flank pain, vaginal bleeding and vaginal discharge. Musculoskeletal: Positive for myalgias. Skin: Negative for rash. Allergic/Immunologic: Negative for immunocompromised state. Neurological: Negative for weakness and headaches. Psychiatric/Behavioral: Negative for confusion. Physical Exam  
Physical Exam  
Constitutional: oriented to person, place, and time. appears well-developed and well-nourished. HENT:  
Head: Normocephalic and atraumatic. Moist mucous membranes Eyes: Pupils are equal, round, and reactive to light. Conjunctivae are normal. Right eye exhibits no discharge. Left eye exhibits no discharge. Neck: Normal range of motion. Neck supple. No tracheal deviation present. Cardiovascular: Normal rate, regular rhythm and normal heart sounds. No murmur heard. Pulmonary/Chest: Effort normal and breath sounds normal. No respiratory distress. no wheezes. no rales. Abdominal: Soft. Bowel sounds are normal. There is no tenderness. There is no rebound and no guarding. Bilateral CVA tenderness Musculoskeletal: Normal range of motion. exhibits no edema, tenderness or deformity. Neurological: alert and oriented to person, place, and time. Skin: Skin is warm and dry. No rash noted. No erythema. Psychiatric: behavior is normal.  
Nursing note and vitals reviewed. Diagnostic Study Results Labs - Recent Results (from the past 12 hour(s)) URINALYSIS W/ REFLEX CULTURE Collection Time: 04/01/19  7:45 PM  
Result Value Ref Range Color YELLOW/STRAW Appearance CLEAR CLEAR Specific gravity 1.019 1.003 - 1.030    
 pH (UA) 6.5 5.0 - 8.0 Protein NEGATIVE  NEG mg/dL Glucose NEGATIVE  NEG mg/dL Ketone NEGATIVE  NEG mg/dL Bilirubin NEGATIVE  NEG Blood NEGATIVE  NEG Urobilinogen 0.2 0.2 - 1.0 EU/dL Nitrites POSITIVE (A) NEG Leukocyte Esterase NEGATIVE  NEG    
 WBC 0-4 0 - 4 /hpf  
 RBC 0-5 0 - 5 /hpf Epithelial cells FEW FEW /lpf Bacteria 4+ (A) NEG /hpf  
 UA:UC IF INDICATED URINE CULTURE ORDERED (A) CNI Hyaline cast 0-2 0 - 5 /lpf Radiologic Studies - No orders to display CT Results  (Last 48 hours) None CXR Results  (Last 48 hours) None Medical Decision Making I am the first provider for this patient. I reviewed the vital signs, available nursing notes, past medical history, past surgical history, family history and social history. Vital Signs-Reviewed the patient's vital signs. Patient Vitals for the past 12 hrs: 
 Temp Pulse Resp BP SpO2  
04/01/19 1932 98.2 °F (36.8 °C) 86 18 133/76 97 % Records Reviewed: Nursing Notes and Old Medical Records Provider Notes (Medical Decision Making):  
 70-year-old female presents with recurrent dysuria. She is afebrile, nontoxic. Urine may actually represent colonization, will put in for straight cath, check labs. May need to discuss with patient whether or not continued antibiotic use would be even beneficial at this point given that she has no signs of infection, urine may represent colonization. Will check labs including pro calcitonin, low clinical suspicion for infection. Disposition likely to home to follow-up with urology/gynecology. ED Course:  
Initial assessment performed. The patients presenting problems have been discussed, and they are in agreement with the care plan formulated and outlined with them. I have encouraged them to ask questions as they arise throughout their visit. ED Course as of Apr 02 1912 Mon Apr 01, 2019  
2326 Patient updated on delay, current labs reviewed. Patient NAD. Looks well from door.  
 [AR] Tue Apr 02, 2019  
0057 Urine likely shows colonization. Procalcitonin level low, infection unlikely. Will plan to discharge to follow up with urogyn.   
 [AR] 0126 Patient requesting hospitalization, feels like she needs hospitalization. I reviewed labs, do not consider her labs consistent with UTI, but more colonization. GIven patient's continued concern will request hospitalist consult and start IV abx. [AR] ED Course User Index [AR] Jordyn Banks,  Hospitalist consult: 
Dr. Leland Hartmann hospitalist was consulted for evaluation. He saw and examined the patient and agreed the patient likely does not have a urinary tract infection based off the history, physical exam, labs. He agreed that this is likely colonization. He is prescribed analgesia. Patient will follow-up as an outpatient with her primary care provider. Critical Care Time:  
None Disposition: 
DISCHARGE NOTE Patients results have been reviewed with them.   Patient and/or family have verbally conveyed their understanding and agreement of the patient's signs, symptoms, diagnosis, treatment and prognosis and additionally agree to follow up as recommended or return to the Emergency Room should their condition change or have any new concerns prior to their follow-up appointment. Patient verbally agrees with the care-plan and verbally conveys that all of their questions have been answered. Discharge instructions have also been provided to the patient with some educational information regarding their diagnosis as well a list of reasons why they would want to return to the ER prior to their follow-up appointment should their condition change. PLAN: 
1. Discharge Medication List as of 4/2/2019  2:46 AM  
  
 
2. Follow-up Information Follow up With Specialties Details Why Contact Info Ariane Ruffin MD Family Practice  As needed [address] [facility] [address] [phone] [facility] EMERGENCY DEPT Emergency Medicine  If symptoms worsen [address] [facility] 
[phone] Graciela Abbott MD Gynecology Schedule an appointment as soon as possible for a visit  [address] [facility] [address]. [phone] Return to ED if worse Diagnosis Clinical Impression: 1. Dysuria 2. Flank pain Attestations:  
This note was completed by Agustín Still DO

## 2019-04-02 NOTE — ED NOTES
Assumed care of pt. Pt states she prolapse of bladder, rectum, vagina hx. Pt states she knows she needs surgery, was told a week ago. Pt reports being here a week ago for UTI, reports oral medication was not working and only the IV medication works. Pt reports nausea, flank pain on both sounds, burning when urinating, odor to urine and that her symptoms started on Friday. Pt reports feeling anxious about her UTI issue.
CHIEF CONCERN:  Status post left shoulder diagnostic arthroscopy and open biceps tenodesis.   DATE OF SURGERY:  03/08/2016.      HISTORY OF PRESENT ILLNESS:  Mr. Valerio is a 54-year-old gentleman who is nearly a year status post the above procedure.  He is referred back to me at this time by Dr. Cottrell with new symptoms involving pain in both shoulders and numbness extending down both arms.  He also has got significant neck pain.  Dr. Cottrell is referring him to Dr. Sanchez.  He has had a CT scan of his neck in 06/2016, but he has not had an MRI.  He describes that the numbness goes down both arms into both hands and he identifies the long and ring finger on the left side as the location where his numbness is most notable.  He also describes that he has increased numbness and pain down both arms if he leans his head to one side or the other.      IMAGING:  None new.      ASSESSMENT:     1.  One year status post left shoulder surgery.   2.  Cervical spine degenerative disk disease.      RECOMMENDATIONS:  I reviewed with Iman and his wife my clinical and radiographic findings.  I outlined for them my concern that he has some type of cervical spine etiology for his symptoms and I would further evaluate this before her further intervening in his shoulders.  I would suggest he have an MRI of his cervical spine prior to seeing Dr. Sanchez especially given his numbness.  He is seeing Dr. Sanchez next Tuesday.  Also, of note, he had a left subacromial injection in his shoulder with Dr. Cottrell yesterday but he is not noting significant pain relief or symptom relief from that injection.       
Dr. Cleveland Ledbetter at bedside.
Dr. Kelby Chapin reviewed discharge instructions with the patient. The patient verbalized understanding. All questions and concerns were addressed. The patient declined a wheelchair and is discharged ambulatory in the care of family members with instructions and prescriptions in hand. Pt is alert and oriented x 4. Respirations are clear and unlabored.
no strength deficits were identified

## 2019-04-03 LAB
BACTERIA SPEC CULT: ABNORMAL
CC UR VC: ABNORMAL
CC UR VC: ABNORMAL
SERVICE CMNT-IMP: ABNORMAL
SERVICE CMNT-IMP: ABNORMAL

## 2019-05-06 ENCOUNTER — HOSPITAL ENCOUNTER (OUTPATIENT)
Dept: INFUSION THERAPY | Age: 49
Discharge: HOME | End: 2019-05-06
Payer: COMMERCIAL

## 2019-05-06 ENCOUNTER — HOSPITAL ENCOUNTER (OUTPATIENT)
Dept: INTERVENTIONAL RADIOLOGY/VASCULAR | Age: 49
Discharge: HOME | End: 2019-05-06
Attending: INTERNAL MEDICINE | Admitting: RADIOLOGY
Payer: COMMERCIAL

## 2019-05-06 VITALS
HEIGHT: 64 IN | DIASTOLIC BLOOD PRESSURE: 80 MMHG | HEART RATE: 78 BPM | BODY MASS INDEX: 35.17 KG/M2 | OXYGEN SATURATION: 99 % | SYSTOLIC BLOOD PRESSURE: 115 MMHG | TEMPERATURE: 98.2 F | WEIGHT: 206 LBS | RESPIRATION RATE: 18 BRPM

## 2019-05-06 VITALS
TEMPERATURE: 97.6 F | SYSTOLIC BLOOD PRESSURE: 103 MMHG | HEART RATE: 81 BPM | DIASTOLIC BLOOD PRESSURE: 70 MMHG | OXYGEN SATURATION: 96 % | RESPIRATION RATE: 18 BRPM

## 2019-05-06 DIAGNOSIS — N39.0 UTI (URINARY TRACT INFECTION): ICD-10-CM

## 2019-05-06 PROCEDURE — 74011000258 HC RX REV CODE- 258: Performed by: INTERNAL MEDICINE

## 2019-05-06 PROCEDURE — 36573 INSJ PICC RS&I 5 YR+: CPT

## 2019-05-06 PROCEDURE — C1751 CATH, INF, PER/CENT/MIDLINE: HCPCS

## 2019-05-06 PROCEDURE — 74011250636 HC RX REV CODE- 250/636: Performed by: RADIOLOGY

## 2019-05-06 PROCEDURE — 74011250636 HC RX REV CODE- 250/636: Performed by: INTERNAL MEDICINE

## 2019-05-06 PROCEDURE — C1894 INTRO/SHEATH, NON-LASER: HCPCS

## 2019-05-06 PROCEDURE — 96365 THER/PROPH/DIAG IV INF INIT: CPT

## 2019-05-06 RX ORDER — HEPARIN 100 UNIT/ML
200 SYRINGE INTRAVENOUS AS NEEDED
Status: DISCONTINUED | OUTPATIENT
Start: 2019-05-06 | End: 2019-05-07 | Stop reason: HOSPADM

## 2019-05-06 RX ORDER — LIDOCAINE HYDROCHLORIDE 10 MG/ML
30 INJECTION, SOLUTION EPIDURAL; INFILTRATION; INTRACAUDAL; PERINEURAL ONCE
Status: COMPLETED | OUTPATIENT
Start: 2019-05-06 | End: 2019-05-06

## 2019-05-06 RX ORDER — SODIUM CHLORIDE 0.9 % (FLUSH) 0.9 %
5-10 SYRINGE (ML) INJECTION AS NEEDED
Status: DISCONTINUED | OUTPATIENT
Start: 2019-05-06 | End: 2019-05-07 | Stop reason: HOSPADM

## 2019-05-06 RX ADMIN — Medication 10 ML: at 13:45

## 2019-05-06 RX ADMIN — LIDOCAINE HYDROCHLORIDE 30 ML: 10 INJECTION, SOLUTION EPIDURAL; INFILTRATION; INTRACAUDAL; PERINEURAL at 13:00

## 2019-05-06 RX ADMIN — SODIUM CHLORIDE, PRESERVATIVE FREE 200 UNITS: 5 INJECTION INTRAVENOUS at 14:24

## 2019-05-06 RX ADMIN — ERTAPENEM SODIUM 1 G: 1 INJECTION, POWDER, LYOPHILIZED, FOR SOLUTION INTRAMUSCULAR; INTRAVENOUS at 13:49

## 2019-05-06 NOTE — PROGRESS NOTES
[facility] VISIT NOTE 
   
9646 EHIQPCX arrives for Antibiotics/Invanz without acute problems. Please see connect care for complete assessment and education provided.  
  
Right arm double lumen PICC; flushed and + blood return noted in both lumens; dressing dry, new, and clean, intact.  
   
Vitals Signs: 
Patient Vitals for the past 12 hrs: 
 Temp Pulse Resp BP SpO2  
05/06/19 1423 97.6 °F (36.4 °C) 81 18 103/70   
05/06/19 1345 98.4 °F (36.9 °C) 80 18 110/75 96 %  
 
 
  Medications: 
Verified by Mickey Box RN via [employer] 1. Invanz IVPB 2. Heparin 200units 
  
Patient's PICC was flushed and heparinized; green kuros caps replaced. 1430 Vital signs stable throughout and prior to discharge, Patient tolerated treatment well and discharged without incident. Patient/parent is aware that after today infusion will be continued at home.

## 2019-05-12 ENCOUNTER — HOSPITAL ENCOUNTER (EMERGENCY)
Age: 49
Discharge: HOME | End: 2019-05-12
Attending: EMERGENCY MEDICINE
Payer: COMMERCIAL

## 2019-05-12 VITALS
DIASTOLIC BLOOD PRESSURE: 76 MMHG | HEART RATE: 80 BPM | HEIGHT: 64 IN | TEMPERATURE: 98.6 F | SYSTOLIC BLOOD PRESSURE: 129 MMHG | BODY MASS INDEX: 35.98 KG/M2 | RESPIRATION RATE: 18 BRPM | WEIGHT: 210.76 LBS | OXYGEN SATURATION: 99 %

## 2019-05-12 DIAGNOSIS — Z48.00 DRESSING CHANGE: ICD-10-CM

## 2019-05-12 DIAGNOSIS — Z45.2 PICC (PERIPHERALLY INSERTED CENTRAL CATHETER) IN PLACE: Primary | ICD-10-CM

## 2019-05-12 PROCEDURE — 99282 EMERGENCY DEPT VISIT SF MDM: CPT

## 2019-05-12 NOTE — ED PROVIDER NOTES
EMERGENCY DEPARTMENT HISTORY AND PHYSICAL EXAM      Date: 5/12/2019  Patient Name: Renee Carrera    History of Presenting Illness     Chief Complaint   Patient presents with    Vascular Access Problem     requesting dressing change        History Provided By: Patient    HPI: Renee Carrera, 50 y.o. female presents ambulatory to the ED with cc of requesting a dressing change over the PICC line in her right upper extremity. She tells me she has a recurring problem with ESBL infections of the bladder and anatomic variance that lead to recurring, severe infections for which she is scheduled for a laparoscopic surgery later this month. Is at the request of infectious disease that a PICC line be placed for medications and in anticipation of her surgery. Today she was taking a shower and the dressing became soggy and given the location she is unable to change it herself. She denies any pain and presently she tells me she feels fine. There are no other complaints, changes, or physical findings at this time. PCP: Baron Rosenberg MD    Current Outpatient Medications   Medication Sig Dispense Refill    ondansetron (ZOFRAN ODT) 8 mg disintegrating tablet Take 1 Tab by mouth every eight (8) hours as needed for Nausea. 12 Tab 0    buPROPion XL (WELLBUTRIN XL) 150 mg tablet Take 150 mg by mouth daily. 0    butalbital-acetaminophen-caffeine (FIORICET, ESGIC) -40 mg per tablet Take 2 Tabs by mouth every four (4) hours as needed for Headache.  0    furosemide (LASIX) 20 mg tablet Take 20 mg by mouth daily as needed for Other. Prn edema  0    gabapentin (NEURONTIN) 300 mg capsule Take 300 mg by mouth three (3) times daily. 3    traZODone (DESYREL) 150 mg tablet Take 150 mg by mouth nightly as needed for Insomnia.  0    COPPER PO Take 2 mg by mouth daily. Pt gets a liquid from a specialty pharmacy      topiramate (TOPAMAX) 50 mg tablet Take 50 mg by mouth two (2) times a day.       cyanocobalamin (VITAMIN B12) 1,000 mcg/mL injection 1,000 mcg by IntraMUSCular route every twenty-eight (28) days.  FLUoxetine (PROZAC) 40 mg capsule Take 40 mg by mouth daily.  ASPIRIN/SALICYLAMIDE/CAFFEINE (BC HEADACHE POWDER PO) Take 1 Packet by mouth daily as needed (headache).  levothyroxine (SYNTHROID) 50 mcg tablet Take 50 mcg by mouth Daily (before breakfast).  ergocalciferol (VITAMIN D2) 50,000 unit capsule Take 50,000 Units by mouth every Tuesday and Friday.        Past History     Past Medical History:  Past Medical History:   Diagnosis Date    Anemia     Anxiety     Arrhythmia     tachy with exertion; HAS PRN MEDS FOR THIS    Arthritis     osteoarthritis    [address] [address]     sarcoidosis    Bowel trouble     Burning with urination     Chronic pain     Connective tissue disorder (HCC)     Depression     NOT SEVERE    Dyspepsia and other specified disorders of function of stomach     Headache     Hypoglycemia     Hypothyroid     Osteoporosis     Prolapse of female genital organs     Sarcoidosis        Past Surgical History:  Past Surgical History:   Procedure Laterality Date    HX BLADDER SUSPENSION      HX CATARACT REMOVAL      HX  SECTION      X2    HX GASTRIC BYPASS  1999    HX HERNIA REPAIR      HX HYSTERECTOMY      HX OTHER SURGICAL      TUMMY TUCK    NEUROLOGICAL PROCEDURE UNLISTED  ,     back surgery        Family History:  Family History   Problem Relation Age of Onset    Hypertension Father     Diabetes Father     Cancer Father         SKIN, \"MOLE\", AND A CANCER OVER EYE    Diabetes Paternal Aunt     Heart Disease Maternal Grandmother     Stroke Maternal Grandmother     Anemia Mother     No Known Problems Brother     Anesth Problems Other         \"REALLY BAD REACTION, I DON'T REMEMBER WHAT HAPPENED\"    No Known Problems Son     No Known Problems Daughter     No Known Problems Daughter        Social History:  Social History     Tobacco Use    Smoking status: Never Smoker    Smokeless tobacco: Never Used   Substance Use Topics    Alcohol use: Yes     Alcohol/week: 0.6 oz     Types: 1 Glasses of wine per week    Drug use: No       Allergies:  No Known Allergies  Review of Systems   Review of Systems   Constitutional: Negative for fatigue and fever. HENT: Negative for ear pain and sore throat. Eyes: Negative for pain, redness and visual disturbance. Respiratory: Negative for cough and shortness of breath. Cardiovascular: Negative for chest pain and palpitations. Gastrointestinal: Negative for abdominal pain, nausea and vomiting. Genitourinary: Negative for dysuria, frequency and urgency. Musculoskeletal: Negative for back pain, gait problem, neck pain and neck stiffness. Skin: Negative for rash and wound. Needs dressing change over the PICC line of her right upper extremity   Neurological: Negative for dizziness, weakness, light-headedness, numbness and headaches. Physical Exam   Physical Exam   Constitutional: She is oriented to person, place, and time. She appears well-developed and well-nourished. Non-toxic appearance. No distress. HENT:   Head: Normocephalic and atraumatic. Right Ear: External ear normal.   Left Ear: External ear normal.   Nose: Nose normal.   Mouth/Throat: Uvula is midline. No trismus in the jaw. Eyes: Pupils are equal, round, and reactive to light. Conjunctivae and EOM are normal. No scleral icterus. Neck: Normal range of motion and full passive range of motion without pain. Cardiovascular: Normal rate and regular rhythm. Pulmonary/Chest: Effort normal. No accessory muscle usage. No tachypnea. No respiratory distress. She has no decreased breath sounds. She has no wheezes. Abdominal: Soft. There is no tenderness. Musculoskeletal: Normal range of motion.         Right upper arm: Normal.        Arms:  Dressing over PICC line of the right upper extremity is changed. Area surrounding PICC line is not red and nontender. New dressing is clean, dry and professionally applied. Neurological: She is alert and oriented to person, place, and time. She is not disoriented. No cranial nerve deficit. GCS eye subscore is 4. GCS verbal subscore is 5. GCS motor subscore is 6. Skin: Skin is intact. No rash noted. Psychiatric: She has a normal mood and affect. Her speech is normal.   Nursing note and vitals reviewed. Diagnostic Study Results     Labs -   No results found for this or any previous visit (from the past 12 hour(s)). Radiologic Studies -   No orders to display     CT Results  (Last 48 hours)    None        CXR Results  (Last 48 hours)    None        Medical Decision Making   I am the first provider for this patient. I reviewed the vital signs, available nursing notes, past medical history, past surgical history, family history and social history. Vital Signs-Reviewed the patient's vital signs. Patient Vitals for the past 12 hrs:   Temp Pulse Resp BP SpO2   05/12/19 1541 98.6 °F (37 °C) 80 18 129/76 99 %       Pulse Oximetry Analysis -99 % on RA    Records Reviewed: Nursing Notes, Old Medical Records, Previous Radiology Studies and Previous Laboratory Studies    Provider Notes (Medical Decision Making): Afebrile; well-appearing; PICC line site of the right upper extremity is without evidence of infection and the dressing is changed    ED Course:   Initial assessment performed. The patients presenting problems have been discussed, and they are in agreement with the care plan formulated and outlined with them. I have encouraged them to ask questions as they arise throughout their visit. Disposition:  Discharge    PLAN:  1. Discharge Medication List as of 5/12/2019  4:24 PM        2. Follow-up Information    None       Return to ED if worse     Diagnosis     Clinical Impression:   1. PICC (peripherally inserted central catheter) in place    2. Dressing change

## 2019-05-12 NOTE — ED NOTES
Patient reports PICC line dressing got wet in the shower this morning. Dressing peeling off and biopatch wet. Dressing removed, site cleaned, and dressing changed.

## 2021-07-06 ENCOUNTER — TRANSCRIBE ORDER (OUTPATIENT)
Dept: SCHEDULING | Age: 51
End: 2021-07-06

## 2021-07-06 DIAGNOSIS — R94.5 NONSPECIFIC ABNORMAL RESULTS OF LIVER FUNCTION STUDY: Primary | ICD-10-CM

## 2021-07-20 ENCOUNTER — HOSPITAL ENCOUNTER (OUTPATIENT)
Dept: ULTRASOUND IMAGING | Age: 51
Discharge: HOME | End: 2021-07-20
Attending: FAMILY MEDICINE
Payer: COMMERCIAL

## 2021-07-20 DIAGNOSIS — R94.5 NONSPECIFIC ABNORMAL RESULTS OF LIVER FUNCTION STUDY: ICD-10-CM

## 2021-07-20 PROCEDURE — 76700 US EXAM ABDOM COMPLETE: CPT

## 2021-09-23 ENCOUNTER — HOSPITAL ENCOUNTER (EMERGENCY)
Age: 51
Discharge: HOME | End: 2021-09-24
Attending: EMERGENCY MEDICINE
Payer: COMMERCIAL

## 2021-09-23 ENCOUNTER — APPOINTMENT (OUTPATIENT)
Dept: GENERAL RADIOLOGY | Age: 51
End: 2021-09-23
Attending: EMERGENCY MEDICINE
Payer: COMMERCIAL

## 2021-09-23 ENCOUNTER — APPOINTMENT (OUTPATIENT)
Dept: CT IMAGING | Age: 51
End: 2021-09-23
Attending: PHYSICIAN ASSISTANT
Payer: COMMERCIAL

## 2021-09-23 VITALS
DIASTOLIC BLOOD PRESSURE: 70 MMHG | WEIGHT: 179 LBS | BODY MASS INDEX: 30.56 KG/M2 | TEMPERATURE: 97.5 F | RESPIRATION RATE: 18 BRPM | OXYGEN SATURATION: 100 % | HEIGHT: 64 IN | SYSTOLIC BLOOD PRESSURE: 122 MMHG | HEART RATE: 95 BPM

## 2021-09-23 DIAGNOSIS — S70.11XA THIGH HEMATOMA, RIGHT, INITIAL ENCOUNTER: ICD-10-CM

## 2021-09-23 DIAGNOSIS — S00.83XA HEMATOMA OF FACE, INITIAL ENCOUNTER: Primary | ICD-10-CM

## 2021-09-23 LAB
COMMENT, HOLDF: NORMAL
SAMPLES BEING HELD,HOLD: NORMAL

## 2021-09-23 PROCEDURE — 99282 EMERGENCY DEPT VISIT SF MDM: CPT

## 2021-09-23 PROCEDURE — 96375 TX/PRO/DX INJ NEW DRUG ADDON: CPT

## 2021-09-23 PROCEDURE — 70486 CT MAXILLOFACIAL W/O DYE: CPT

## 2021-09-23 PROCEDURE — 96374 THER/PROPH/DIAG INJ IV PUSH: CPT

## 2021-09-23 PROCEDURE — 96376 TX/PRO/DX INJ SAME DRUG ADON: CPT

## 2021-09-23 PROCEDURE — 80053 COMPREHEN METABOLIC PANEL: CPT

## 2021-09-23 PROCEDURE — 73552 X-RAY EXAM OF FEMUR 2/>: CPT

## 2021-09-23 RX ORDER — FENTANYL CITRATE 50 UG/ML
50 INJECTION, SOLUTION INTRAMUSCULAR; INTRAVENOUS
Status: COMPLETED | OUTPATIENT
Start: 2021-09-24 | End: 2021-09-24

## 2021-09-23 RX ORDER — ONDANSETRON 2 MG/ML
4 INJECTION INTRAMUSCULAR; INTRAVENOUS
Status: COMPLETED | OUTPATIENT
Start: 2021-09-24 | End: 2021-09-24

## 2021-09-23 NOTE — Clinical Note
[address]  [facility] EMERGENCY DEPT  [address] [place] [address]  Tirso Barrow [id number] [phone]    Work/School Note    Date: 9/23/2021    To Whom It May concern:    Francisco Busby was seen and treated today in the emergency room by the following provider(s):  Attending Provider: Bella Rahman MD.      Francisco Busby is excused from work/school on 9/24/2021 through 9/27/2021. She is medically clear to return to work/school on 9/28/2021.         Sincerely,          Lv Cuello MD

## 2021-09-24 ENCOUNTER — APPOINTMENT (OUTPATIENT)
Dept: CT IMAGING | Age: 51
End: 2021-09-24
Attending: EMERGENCY MEDICINE
Payer: COMMERCIAL

## 2021-09-24 LAB
ALBUMIN SERPL-MCNC: 4.2 G/DL (ref 3.5–5)
ALBUMIN/GLOB SERPL: 1.6 {RATIO} (ref 1.1–2.2)
ALP SERPL-CCNC: 111 U/L (ref 45–117)
ALT SERPL-CCNC: 55 U/L (ref 12–78)
ANION GAP SERPL CALC-SCNC: 9 MMOL/L (ref 5–15)
AST SERPL-CCNC: 61 U/L (ref 15–37)
BASOPHILS # BLD: 0 K/UL (ref 0–0.1)
BASOPHILS NFR BLD: 0 % (ref 0–1)
BILIRUB SERPL-MCNC: 0.6 MG/DL (ref 0.2–1)
BUN SERPL-MCNC: 24 MG/DL (ref 6–20)
BUN/CREAT SERPL: 41 (ref 12–20)
CALCIUM SERPL-MCNC: 8.6 MG/DL (ref 8.5–10.1)
CHLORIDE SERPL-SCNC: 107 MMOL/L (ref 97–108)
CO2 SERPL-SCNC: 25 MMOL/L (ref 21–32)
CREAT SERPL-MCNC: 0.58 MG/DL (ref 0.55–1.02)
DIFFERENTIAL METHOD BLD: ABNORMAL
EOSINOPHIL # BLD: 0.1 K/UL (ref 0–0.4)
EOSINOPHIL NFR BLD: 2 % (ref 0–7)
ERYTHROCYTE [DISTWIDTH] IN BLOOD BY AUTOMATED COUNT: 12.7 % (ref 11.5–14.5)
GLOBULIN SER CALC-MCNC: 2.7 G/DL (ref 2–4)
GLUCOSE SERPL-MCNC: 107 MG/DL (ref 65–100)
HCT VFR BLD AUTO: 34 % (ref 35–47)
HGB BLD-MCNC: 10.5 G/DL (ref 11.5–16)
IMM GRANULOCYTES # BLD AUTO: 0 K/UL (ref 0–0.04)
IMM GRANULOCYTES NFR BLD AUTO: 0 % (ref 0–0.5)
LYMPHOCYTES # BLD: 1.3 K/UL (ref 0.8–3.5)
LYMPHOCYTES NFR BLD: 23 % (ref 12–49)
MCH RBC QN AUTO: 29.7 PG (ref 26–34)
MCHC RBC AUTO-ENTMCNC: 30.9 G/DL (ref 30–36.5)
MCV RBC AUTO: 96 FL (ref 80–99)
MONOCYTES # BLD: 0.5 K/UL (ref 0–1)
MONOCYTES NFR BLD: 8 % (ref 5–13)
NEUTS SEG # BLD: 4 K/UL (ref 1.8–8)
NEUTS SEG NFR BLD: 67 % (ref 32–75)
NRBC # BLD: 0 K/UL (ref 0–0.01)
NRBC BLD-RTO: 0 PER 100 WBC
PLATELET # BLD AUTO: 219 K/UL (ref 150–400)
PMV BLD AUTO: 10.1 FL (ref 8.9–12.9)
POTASSIUM SERPL-SCNC: 4.2 MMOL/L (ref 3.5–5.1)
PROT SERPL-MCNC: 6.9 G/DL (ref 6.4–8.2)
RBC # BLD AUTO: 3.54 M/UL (ref 3.8–5.2)
SODIUM SERPL-SCNC: 141 MMOL/L (ref 136–145)
WBC # BLD AUTO: 5.9 K/UL (ref 3.6–11)

## 2021-09-24 PROCEDURE — 85025 COMPLETE CBC W/AUTO DIFF WBC: CPT

## 2021-09-24 PROCEDURE — 36415 COLL VENOUS BLD VENIPUNCTURE: CPT

## 2021-09-24 PROCEDURE — 74011250636 HC RX REV CODE- 250/636: Performed by: EMERGENCY MEDICINE

## 2021-09-24 PROCEDURE — 73701 CT LOWER EXTREMITY W/DYE: CPT

## 2021-09-24 PROCEDURE — 74011000636 HC RX REV CODE- 636: Performed by: EMERGENCY MEDICINE

## 2021-09-24 RX ORDER — FENTANYL CITRATE 50 UG/ML
50 INJECTION, SOLUTION INTRAMUSCULAR; INTRAVENOUS
Status: COMPLETED | OUTPATIENT
Start: 2021-09-24 | End: 2021-09-24

## 2021-09-24 RX ORDER — HYDROCODONE BITARTRATE AND ACETAMINOPHEN 5; 325 MG/1; MG/1
1 TABLET ORAL
Qty: 10 TABLET | Refills: 0 | Status: SHIPPED | OUTPATIENT
Start: 2021-09-24 | End: 2021-09-27

## 2021-09-24 RX ADMIN — ONDANSETRON 4 MG: 2 INJECTION INTRAMUSCULAR; INTRAVENOUS at 00:07

## 2021-09-24 RX ADMIN — IOPAMIDOL 100 ML: 755 INJECTION, SOLUTION INTRAVENOUS at 00:36

## 2021-09-24 RX ADMIN — FENTANYL CITRATE 50 MCG: 0.05 INJECTION, SOLUTION INTRAMUSCULAR; INTRAVENOUS at 01:50

## 2021-09-24 RX ADMIN — FENTANYL CITRATE 50 MCG: 0.05 INJECTION, SOLUTION INTRAMUSCULAR; INTRAVENOUS at 00:07

## 2021-09-24 NOTE — ED PROVIDER NOTES
EMERGENCY DEPARTMENT HISTORY AND PHYSICAL EXAM      Date: 9/23/2021  Patient Name: Valente Wagner    History of Presenting Illness     Chief Complaint   Patient presents with   [facility] Fall     pt presents after falling on doggy gait w/ c/o right leg swelling/pain, and chin pain. pt has bruising to right leg and chin       History Provided By: Patient and Patient's     HPI: Valente Wagner, 48 y.o. female with PMHx significant for migraines, hypoglycemia, hypothyroidism presents to the ED with chief complaint of pain in her right jaw and right thigh. Patient tripped over a gate when going into the bathroom in the dark and fell over the gate hitting her right side of her chin on the floor. The gait was made of a sturdy piece of wood and as she fell it injured her right thigh. She reports that there was a bruise initially, but almost immediately it swelled significantly and became very tight and extremely painful. Reports the skin overlying the area is tight and numb. She denies loss of consciousness, but states that when she hit her chin she felt lightheaded and dizzy and felt nauseous and had some dry heaves, although she did not vomit. She did not hit her actual head, but just her chin. She reports all of her muscles are sore in her neck, back, abdomen. She was able to bear weight on her leg initially, but as the swelling worsened, it became more and more painful to walk. PCP: Cyndie Fields MD    No current facility-administered medications on file prior to encounter. Current Outpatient Medications on File Prior to Encounter   Medication Sig Dispense Refill    ondansetron (ZOFRAN ODT) 8 mg disintegrating tablet Take 1 Tab by mouth every eight (8) hours as needed for Nausea. 12 Tab 0    buPROPion XL (WELLBUTRIN XL) 150 mg tablet Take 150 mg by mouth daily.   0    butalbital-acetaminophen-caffeine (FIORICET, ESGIC) -40 mg per tablet Take 2 Tabs by mouth every four (4) hours as needed for Headache.  0    furosemide (LASIX) 20 mg tablet Take 20 mg by mouth daily as needed for Other. Prn edema  0    gabapentin (NEURONTIN) 300 mg capsule Take 300 mg by mouth three (3) times daily. 3    traZODone (DESYREL) 150 mg tablet Take 150 mg by mouth nightly as needed for Insomnia.  0    COPPER PO Take 2 mg by mouth daily. Pt gets a liquid from a specialty pharmacy      topiramate (TOPAMAX) 50 mg tablet Take 50 mg by mouth two (2) times a day.  cyanocobalamin (VITAMIN B12) 1,000 mcg/mL injection 1,000 mcg by IntraMUSCular route every twenty-eight (28) days.  FLUoxetine (PROZAC) 40 mg capsule Take 40 mg by mouth daily.  ASPIRIN/SALICYLAMIDE/CAFFEINE (BC HEADACHE POWDER PO) Take 1 Packet by mouth daily as needed (headache).  levothyroxine (SYNTHROID) 50 mcg tablet Take 50 mcg by mouth Daily (before breakfast).  ergocalciferol (VITAMIN D2) 50,000 unit capsule Take 50,000 Units by mouth every Tuesday and Friday.          Past History     Past Medical History:  Past Medical History:   Diagnosis Date    Anemia     Anxiety     Arrhythmia     tachy with exertion; HAS PRN MEDS FOR THIS    Arthritis     osteoarthritis    Autoimmune disease ([address]     sarcoidosis    Bowel trouble     Burning with urination     Chronic pain     Connective tissue disorder ([address]     Depression     NOT SEVERE    Dyspepsia and other specified disorders of function of stomach     Headache     Hypoglycemia     Hypothyroid     Osteoporosis     Prolapse of female genital organs     Sarcoidosis        Past Surgical History:  Past Surgical History:   Procedure Laterality Date    HX BLADDER SUSPENSION      HX CATARACT REMOVAL      HX  SECTION      X2    HX GASTRIC BYPASS  1999    HX HERNIA REPAIR      HX HYSTERECTOMY      HX OTHER SURGICAL      TUMMY [facility]    NEUROLOGICAL PROCEDURE UNLISTED  ,     back surgery        Family History:  Family History   Problem Relation Age of Onset    Hypertension Father     Diabetes Father     Cancer Father         SKIN, \"MOLE\", AND A CANCER OVER EYE    Diabetes Paternal Aunt     Heart Disease Maternal Grandmother     Stroke Maternal Grandmother     Anemia Mother     No Known Problems Brother     Anesth Problems Other         \"REALLY BAD REACTION, I DON'T REMEMBER WHAT HAPPENED\"    No Known Problems Son     No Known Problems Daughter     No Known Problems Daughter        Social History:  Social History     Tobacco Use    Smoking status: Never Smoker    Smokeless tobacco: Never Used   Substance Use Topics    Alcohol use: Yes     Alcohol/week: 1.0 standard drinks     Types: 1 Glasses of wine per week    Drug use: No       Allergies:  No Known Allergies      Review of Systems   Review of Systems   Constitutional: Negative for chills and fever. HENT: Positive for facial swelling. Negative for congestion, sinus pain and sore throat. Eyes: Negative for visual disturbance. Respiratory: Negative for cough, chest tightness, shortness of breath and wheezing. Cardiovascular: Negative for chest pain and palpitations. Gastrointestinal: Positive for nausea. Negative for abdominal pain, diarrhea and vomiting. Genitourinary: Negative for dysuria, flank pain and hematuria. Musculoskeletal: Positive for myalgias. Skin: Positive for wound ( Abrasion right anterior thigh). Negative for rash. Neurological: Positive for light-headedness and headaches. Negative for syncope. Psychiatric/Behavioral: Negative for confusion. The patient is nervous/anxious. All other systems reviewed and are negative.         Physical Exam    General appearance - well nourished, well appearing, and in no distress  Eyes - pupils equal and reactive, extraocular eye movements intact  ENT - mucous membranes moist, pharynx normal without lesions swelling and ecchymosis on right shin  Neck - supple, no significant adenopathy; non-tender to palpation over cervical spine, but tender to palpation bilateral cervical musculature  Chest - clear to auscultation, no wheezes, rales or rhonchi; non-tender to palpation  Heart - normal rate and regular rhythm, S1 and S2 normal, no murmurs noted  Abdomen - soft, nontender, nondistended, no masses or organomegaly  Musculoskeletal -marked swelling of the right thigh with overlying abrasion and visible ecchymosis that seems to cover the whole anterior aspect of the right thigh; this area is tense and very tender   extremities - peripheral pulses normal, no pedal edema  Skin - normal coloration and turgor, no rashes  Neurological - alert, oriented x3, normal speech, no focal findings or movement disorder noted    Diagnostic Study Results     Labs -     Recent Results (from the past 12 hour(s))   SAMPLES BEING HELD    Collection Time: 09/23/21 11:25 PM   Result Value Ref Range    SAMPLES BEING HELD LV PST     COMMENT        Add-on orders for these samples will be processed based on acceptable specimen integrity and analyte stability, which may vary by analyte. Radiologic Studies -   XR FEMUR RT 2 VS   Final Result   Anterior soft tissue swelling. No acute fracture. CT MAXILLOFACIAL WO CONT   Final Result      No acute fracture. CT LOW EXT RT W CONT    (Results Pending)     CT Results  (Last 48 hours)               09/23/21 2300  CT MAXILLOFACIAL WO CONT Final result    Impression:      No acute fracture. Narrative:  INDICATION:  injury        EXAMINATION:  MAXILLOFACIAL CT       COMPARISON:  August 17, 2007       TECHNIQUE:  Axial CT was performed through the maxillofacial bones. Coronal and   sagittal reconstructions were obtained. CT dose reduction was achieved through   use of a standardized protocol tailored for this examination and automatic   exposure control for dose modulation. FINDINGS:       Facial bones:  No acute fracture.    Soft tissues: Mild soft tissue swelling anterior to the mandible right of   midline. Paranasal sinuses:  Clear. Orbits:  Normal.               CXR Results  (Last 48 hours)    None            Medical Decision Making   I am the first provider for this patient. I reviewed the vital signs, available nursing notes, past medical history, past surgical history, family history and social history. Vital Signs-Reviewed the patient's vital signs. Patient Vitals for the past 12 hrs:   Temp Pulse Resp BP SpO2   09/23/21 2159 97.5 °F (36.4 °C) 95 18 122/70 100 %         Records Reviewed: Nursing Notes and Old Medical Records    Provider Notes (Medical Decision Making):   Differential diagnosis: Fracture, contusion, active extravasation of hematoma on right thigh, compartment syndrome  We will check CBC, CMP, CT of max face and CT with contrast of right thigh. Will treat with ice, elevation, and compression    ED Course:   Initial assessment performed. The patients presenting problems have been discussed, and they are in agreement with the care plan formulated and outlined with them. I have encouraged them to ask questions as they arise throughout their visit. Progress Notes:  ED Course as of Sep 24 0321   Fri Sep 24, 2021   5641 Case discussed with Dr. Marya Gabriel (general surgery). He agrees with plan for Ace wrap, ice pack, elevation. Patient may follow-up in the office. Does not think patient needs to be admitted or have any further treatment or imaging at this time. .    [AO]      ED Course User Index  [AO] Kim Estrella MD       Disposition:  MT home  PLAN:  1. Current Discharge Medication List      START taking these medications    Details   HYDROcodone-acetaminophen (Norco) 5-325 mg per tablet Take 1 Tablet by mouth every six (6) hours as needed for Pain for up to 3 days. Max Daily Amount: 4 Tablets.   Qty: 10 Tablet, Refills: 0  Start date: 9/24/2021, End date: 9/27/2021    Associated Diagnoses: Thigh hematoma, right, initial encounter 2.   Follow-up Information     Follow up With Specialties Details Why Contact Info    Taty Chavez MD Family Medicine Schedule an appointment as soon as possible for a visit   [address]  [address] [address]  [phone]      [facility] EMERGENCY DEPT Emergency Medicine  If symptoms worsen [address]    Dom Zambrano MD General Surgery Schedule an appointment as soon as possible for a visit   [facility] [address]  [address] [phone]          Return to ED if worse     Diagnosis     Clinical Impression:   1. Hematoma of face, initial encounter    2.  Thigh hematoma, right, initial encounter

## 2021-09-24 NOTE — ED NOTES
Delores PETERSON reviewed discharge instructions with the patient. The patient verbalized understanding. All questions and concerns were addressed. The patient is discharged by wheelchair with instructions and prescriptions in hand. Pt is alert and oriented x 4. Respirations are clear and unlabored.

## 2021-10-06 ENCOUNTER — OFFICE VISIT (OUTPATIENT)
Dept: SURGERY | Age: 51
End: 2021-10-06
Payer: COMMERCIAL

## 2021-10-06 VITALS
WEIGHT: 185 LBS | TEMPERATURE: 97.3 F | HEART RATE: 80 BPM | SYSTOLIC BLOOD PRESSURE: 112 MMHG | BODY MASS INDEX: 31.58 KG/M2 | RESPIRATION RATE: 16 BRPM | OXYGEN SATURATION: 98 % | DIASTOLIC BLOOD PRESSURE: 72 MMHG | HEIGHT: 64 IN

## 2021-10-06 DIAGNOSIS — S70.11XA HEMATOMA OF RIGHT THIGH, INITIAL ENCOUNTER: Primary | ICD-10-CM

## 2021-10-06 PROCEDURE — 99243 OFF/OP CNSLTJ NEW/EST LOW 30: CPT | Performed by: SURGERY

## 2021-10-06 NOTE — LETTER
10/6/2021    Patient: Dai Griffith   YOB: 1970   Date of Visit: 10/6/2021     Serena Monahan MD  [facility]  [address] [phone]  [address] [facility] [address]    Dear Serena Monahan MD,      Thank you for referring Ms. Gemini Regalado to [address] [place], [place] [address]alex[address]  for evaluation. My notes for this consultation are attached. If you have questions, please do not hesitate to call me. I look forward to following your patient along with you.       Sincerely,    Moise Hernandez MD Hemostasis: Electrodesiccation

## 2021-10-06 NOTE — PROGRESS NOTES
HISTORY OF PRESENT ILLNESS  Tsering Jimenez is a 48 y.o. female who comes in for consultation by Shari Nageotte, MD for a thigh hematoma  HPI  She tripped over a dog gate and fell on her right thigh. She developed acute swelling and went to the ER on 2021 and had a CT noting \"soft tissue swelling throughout the anterior thigh, with a large hematoma in the superficial soft tissues of the right thigh measuring 3.2 x 10.7 x 8.2 cm. There is a small area of active extravasation within the ventral aspect of the hematoma. \"  She was sent home from the ER with an acewrap and instructions for ice and elevation. She reports ongoing swelling and discomfort. She denies drainage but notes that the bruising goes down her leg. She reports pain with walking and activity. She has been taking ibuprofen. She denies syncope, nausea, vomiting, diarrhea, constipation.       Past Medical History:   Diagnosis Date    Anemia     Anxiety     Arrhythmia     tachy with exertion; HAS PRN MEDS FOR THIS    Arthritis     osteoarthritis    Autoimmune disease ([address]     sarcoidosis    Bowel trouble     Burning with urination     Chronic pain     Connective tissue disorder ([address]     Depression     NOT SEVERE    Dyspepsia and other specified disorders of function of stomach     Headache     Hypoglycemia     Hypothyroid     Osteoporosis     Prolapse of female genital organs     Sarcoidosis      Past Surgical History:   Procedure Laterality Date    HX BLADDER SUSPENSION      HX CATARACT REMOVAL      HX  SECTION      X2    HX GASTRIC BYPASS  1999    HX HERNIA REPAIR      HX HYSTERECTOMY      HX OTHER SURGICAL      TUMMY TUCK    NEUROLOGICAL PROCEDURE UNLISTED  ,     back surgery      Family History   Problem Relation Age of Onset    Hypertension Father     Diabetes Father     Cancer Father         SKIN, \"MOLE\", AND A CANCER OVER EYE    Diabetes Paternal Aunt     Heart Disease Maternal Grandmother     Stroke Maternal Grandmother     Anemia Mother     No Known Problems Brother     Anesth Problems Other         \"REALLY BAD REACTION, I DON'T REMEMBER WHAT HAPPENED\"    No Known Problems Son     No Known Problems Daughter     No Known Problems Daughter      Social History     Tobacco Use    Smoking status: Never Smoker    Smokeless tobacco: Never Used   Substance Use Topics    Alcohol use: Yes     Alcohol/week: 1.0 standard drinks     Types: 1 Glasses of wine per week    Drug use: No     Current Outpatient Medications   Medication Sig    ondansetron (ZOFRAN ODT) 8 mg disintegrating tablet Take 1 Tab by mouth every eight (8) hours as needed for Nausea.  butalbital-acetaminophen-caffeine (FIORICET, ESGIC) -40 mg per tablet Take 2 Tabs by mouth every four (4) hours as needed for Headache.  COPPER PO Take 2 mg by mouth daily. Pt gets a liquid from a specialty pharmacy    cyanocobalamin (VITAMIN B12) 1,000 mcg/mL injection 1,000 mcg by IntraMUSCular route every twenty-eight (28) days.  FLUoxetine (PROZAC) 40 mg capsule Take 40 mg by mouth daily.  levothyroxine (SYNTHROID) 50 mcg tablet Take 50 mcg by mouth Daily (before breakfast).  furosemide (LASIX) 20 mg tablet Take 20 mg by mouth daily as needed for Other. Prn edema (Patient not taking: Reported on 56/8/3406)    ASPIRIN/SALICYLAMIDE/CAFFEINE (BC HEADACHE POWDER PO) Take 1 Packet by mouth daily as needed (headache). (Patient not taking: Reported on 10/6/2021)    ergocalciferol (VITAMIN D2) 50,000 unit capsule Take 50,000 Units by mouth every Tuesday and Friday. (Patient not taking: Reported on 10/6/2021)     No current facility-administered medications for this visit. No Known Allergies    Review of Systems   Constitutional: Positive for diaphoresis and malaise/fatigue. Negative for chills, fever and weight loss. HENT: Negative for sore throat. Eyes: Negative for blurred vision and discharge. Respiratory: Negative for cough, shortness of breath and wheezing. Cardiovascular: Positive for claudication. Negative for chest pain, palpitations, orthopnea and leg swelling. Gastrointestinal: Positive for nausea and vomiting. Negative for abdominal pain, constipation, diarrhea, heartburn and melena. Genitourinary: Negative for dysuria, flank pain, frequency and hematuria. Musculoskeletal: Positive for back pain, joint pain and myalgias. Negative for neck pain. Skin: Negative for rash. Neurological: Positive for headaches. Negative for dizziness, speech change, focal weakness, seizures, loss of consciousness and weakness. Endo/Heme/Allergies: Bruises/bleeds easily. Psychiatric/Behavioral: Negative for depression and memory loss. Visit Vitals  /72 (BP 1 Location: Left upper arm, BP Patient Position: Sitting, BP Cuff Size: Adult)   Pulse 80   Temp 97.3 °F (36.3 °C) (Temporal)   Resp 16   Ht 5' 4\" (1.626 m)   Wt 83.9 kg (185 lb)   SpO2 98%   BMI 31.76 kg/m²       Physical Exam  Constitutional:       General: She is not in acute distress. Appearance: She is well-developed. She is not diaphoretic. HENT:      Head: Normocephalic and atraumatic. Mouth/Throat:      Pharynx: No oropharyngeal exudate. Eyes:      General: No scleral icterus. Conjunctiva/sclera: Conjunctivae normal.      Pupils: Pupils are equal, round, and reactive to light. Neck:      Thyroid: No thyromegaly. Vascular: No JVD. Trachea: No tracheal deviation. Cardiovascular:      Rate and Rhythm: Normal rate and regular rhythm. Heart sounds: No murmur heard. No friction rub. No gallop. Pulmonary:      Effort: Pulmonary effort is normal. No respiratory distress. Breath sounds: Normal breath sounds. No wheezing or rales. Abdominal:      General: Bowel sounds are normal. There is no distension. Palpations: Abdomen is soft. There is no mass. Tenderness:  There is no abdominal tenderness. There is no guarding or rebound. Musculoskeletal:         General: Normal range of motion. Cervical back: Normal range of motion and neck supple. Comments: Large anterior thigh fullness with some later stages of ecchymosis/bruising  Small linear healing abrasion but no ulceration or drainage    Some limited RLE ROM with knee flexion/extension due to pain    No loss of motor function   Lymphadenopathy:      Cervical: No cervical adenopathy. Skin:     General: Skin is warm and dry. Coloration: Skin is not pale. Findings: No erythema or rash. Neurological:      Mental Status: She is alert and oriented to person, place, and time. Cranial Nerves: No cranial nerve deficit. Psychiatric:         Behavior: Behavior normal.         Thought Content: Thought content normal.         Judgment: Judgment normal.       I reviewed her CT images today    ASSESSMENT and PLAN  1. Post traumatic right thigh hematoma. I do not feel that she has active bleeding. At this point it is likely too early for aspiration. I would not recommend exploration and evacuation of the hematoma at this time either. I have recommend warm compresses to allow liquifaction of the hematoma in hopes for aspiration in the future    2. Sarcoidosis    3.   Chronic abdominal pain    RTC in 2 weeks      Mohamud Cotton MD FACS

## 2021-10-06 NOTE — PROGRESS NOTES
Identified pt with two pt identifiers(name and ). Reviewed record in preparation for visit and have obtained necessary documentation. All patient medications has been reviewed. Chief Complaint   Patient presents with    Mass     [facility]/happened on        Health Maintenance Due   Topic    Hepatitis C Screening     COVID-19 Vaccine (1)    DTaP/Tdap/Td series (1 - Tdap)    Lipid Screen     Colorectal Cancer Screening Combo     Shingrix Vaccine Age 50> (1 of 2)    Breast Cancer Screen Mammogram     Flu Vaccine (1)       Vitals:    10/06/21 1550   BP: 112/72   Pulse: 80   Resp: 16   Temp: 97.3 °F (36.3 °C)   TempSrc: Temporal   SpO2: 98%   Weight: 83.9 kg (185 lb)   Height: 5' 4\" (1.626 m)   PainSc:  10 - Worst pain ever   PainLoc: Leg       4. Have you been to the ER, urgent care clinic since your last visit? Hospitalized since your last visit? [address] [facility] 21 Right thigh hematoma    5. Have you seen or consulted any other health care providers outside of the [address] [place] [address] since your last visit? Include any pap smears or colon screening. No    Patient is accompanied by self I have received verbal consent from Jeanne Parker to discuss any/all medical information while they are present in the room.

## 2021-10-20 ENCOUNTER — OFFICE VISIT (OUTPATIENT)
Dept: SURGERY | Age: 51
End: 2021-10-20
Payer: COMMERCIAL

## 2021-10-20 VITALS
WEIGHT: 183.6 LBS | DIASTOLIC BLOOD PRESSURE: 66 MMHG | HEIGHT: 64 IN | TEMPERATURE: 97.5 F | RESPIRATION RATE: 16 BRPM | OXYGEN SATURATION: 97 % | SYSTOLIC BLOOD PRESSURE: 108 MMHG | HEART RATE: 108 BPM | BODY MASS INDEX: 31.34 KG/M2

## 2021-10-20 DIAGNOSIS — S70.11XD HEMATOMA OF RIGHT THIGH, SUBSEQUENT ENCOUNTER: Primary | ICD-10-CM

## 2021-10-20 PROCEDURE — 99213 OFFICE O/P EST LOW 20 MIN: CPT | Performed by: SURGERY

## 2021-10-20 NOTE — LETTER
10/20/2021    Patient: Komal Mckeon   YOB: 1970   Date of Visit: 10/20/2021     Chari Pena MD  [address] [address]  [address] [phone]  [address] [facility] [address]    Dear Chari Pena MD,      Thank you for referring Ms. Ministerio Cowart to [address] [place] [address] for evaluation. My notes for this consultation are attached. If you have questions, please do not hesitate to call me. I look forward to following your patient along with you.       Sincerely,    Valentina Gardner MD

## 2021-10-20 NOTE — PROGRESS NOTES
Identified pt with two pt identifiers(name and ). Reviewed record in preparation for visit and have obtained necessary documentation. All patient medications has been reviewed. Chief Complaint   Patient presents with    Follow-up      Post traumatic right thigh hematoma       Health Maintenance Due   Topic    Hepatitis C Screening     COVID-19 Vaccine (1)    DTaP/Tdap/Td series (1 - Tdap)    Lipid Screen     Colorectal Cancer Screening Combo     Shingrix Vaccine Age 50> (1 of 2)    Breast Cancer Screen Mammogram     Flu Vaccine (1)       Vitals:    10/20/21 1405   BP: 108/66   Pulse: (!) 108   Resp: 16   Temp: 97.5 °F (36.4 °C)   SpO2: 97%   Weight: 83.3 kg (183 lb 9.6 oz)   Height: 5' 4\" (1.626 m)   PainSc:  10 - Worst pain ever   PainLoc: Leg       4. Have you been to the ER, urgent care clinic since your last visit? Hospitalized since your last visit? No    5. Have you seen or consulted any other health care providers outside of the [address] [place] [address] since your last visit? Include any pap smears or colon screening. No      Patient is accompanied by self I have received verbal consent from Komal Mckeon to discuss any/all medical information while they are present in the room.

## 2021-10-20 NOTE — PROGRESS NOTES
HISTORY OF PRESENT ILLNESS  Sher Oconnell is a 48 y.o. female who comes in for follow up by Nydia Nance MD for a thigh hematoma  Follow-up  Associated symptoms include headaches. Pertinent negatives include no chest pain, no abdominal pain and no shortness of breath. She tripped over a dog gate and fell on her right thigh. She developed acute swelling and went to the ER on 2021 and had a CT noting \"soft tissue swelling throughout the anterior thigh, with a large hematoma in the superficial soft tissues of the right thigh measuring 3.2 x 10.7 x 8.2 cm. There is a small area of active extravasation within the ventral aspect of the hematoma. \"  She was sent home from the ER with an acewrap and instructions for ice and elevation. She reports ongoing swelling and discomfort. She denies drainage but notes that the bruising goes down her leg. She reports pain with walking and activity. She has been taking ibuprofen. She denies syncope, nausea, vomiting, diarrhea, constipation.       Past Medical History:   Diagnosis Date    Anemia     Anxiety     Arrhythmia     tachy with exertion; HAS PRN MEDS FOR THIS    Arthritis     osteoarthritis    Autoimmune disease ([address]     sarcoidosis    Bowel trouble     Burning with urination     Chronic pain     Connective tissue disorder ([address]     Depression     NOT SEVERE    Dyspepsia and other specified disorders of function of stomach     Headache     Hypoglycemia     Hypothyroid     Osteoporosis     Prolapse of female genital organs     Sarcoidosis      Past Surgical History:   Procedure Laterality Date    HX BLADDER SUSPENSION      HX CATARACT REMOVAL      HX  SECTION      X2    HX GASTRIC BYPASS  1999    HX HERNIA REPAIR      HX HYSTERECTOMY      HX OTHER SURGICAL      TUMMY TUCK    NEUROLOGICAL PROCEDURE UNLISTED  ,     back surgery      Family History   Problem Relation Age of Onset    Hypertension Father  Diabetes Father     Cancer Father         SKIN, \"MOLE\", AND A CANCER OVER EYE    Diabetes Paternal Aunt     Heart Disease Maternal Grandmother     Stroke Maternal Grandmother     Anemia Mother     No Known Problems Brother     Anesth Problems Other         \"REALLY BAD REACTION, I DON'T REMEMBER WHAT HAPPENED\"    No Known Problems Son     No Known Problems Daughter     No Known Problems Daughter      Social History     Tobacco Use    Smoking status: Never Smoker    Smokeless tobacco: Never Used   Substance Use Topics    Alcohol use: Yes     Alcohol/week: 1.0 standard drinks     Types: 1 Glasses of wine per week    Drug use: No     Current Outpatient Medications   Medication Sig    ondansetron (ZOFRAN ODT) 8 mg disintegrating tablet Take 1 Tab by mouth every eight (8) hours as needed for Nausea.  furosemide (LASIX) 20 mg tablet Take 20 mg by mouth daily as needed for Other. Prn edema (Patient not taking: Reported on 10/6/2021)    cyanocobalamin (VITAMIN B12) 1,000 mcg/mL injection 1,000 mcg by IntraMUSCular route every twenty-eight (28) days.  FLUoxetine (PROZAC) 40 mg capsule Take 40 mg by mouth daily.  ASPIRIN/SALICYLAMIDE/CAFFEINE (BC HEADACHE POWDER PO) Take 1 Packet by mouth daily as needed (headache). (Patient not taking: Reported on 10/6/2021)    levothyroxine (SYNTHROID) 50 mcg tablet Take 50 mcg by mouth Daily (before breakfast).  ergocalciferol (VITAMIN D2) 50,000 unit capsule Take 50,000 Units by mouth every Tuesday and Friday. (Patient not taking: Reported on 10/6/2021)     No current facility-administered medications for this visit. No Known Allergies    Review of Systems   Constitutional: Positive for diaphoresis and malaise/fatigue. Negative for chills, fever and weight loss. HENT: Negative for sore throat. Eyes: Negative for blurred vision and discharge. Respiratory: Negative for cough, shortness of breath and wheezing.     Cardiovascular: Positive for claudication. Negative for chest pain, palpitations, orthopnea and leg swelling. Gastrointestinal: Positive for nausea and vomiting. Negative for abdominal pain, constipation, diarrhea, heartburn and melena. Genitourinary: Negative for dysuria, flank pain, frequency and hematuria. Musculoskeletal: Positive for back pain, joint pain and myalgias. Negative for neck pain. Skin: Negative for rash. Neurological: Positive for headaches. Negative for dizziness, speech change, focal weakness, seizures, loss of consciousness and weakness. Endo/Heme/Allergies: Bruises/bleeds easily. Psychiatric/Behavioral: Negative for depression and memory loss. Visit Vitals  /66 (BP 1 Location: Left upper arm, BP Patient Position: Sitting, BP Cuff Size: Adult)   Pulse (!) 108   Temp 97.5 °F (36.4 °C)   Resp 16   Ht 5' 4\" (1.626 m)   Wt 83.3 kg (183 lb 9.6 oz)   SpO2 97%   BMI 31.51 kg/m²       Physical Exam  Constitutional:       General: She is not in acute distress. Appearance: She is well-developed. She is not diaphoretic. HENT:      Head: Normocephalic and atraumatic. Mouth/Throat:      Pharynx: No oropharyngeal exudate. Eyes:      General: No scleral icterus. Conjunctiva/sclera: Conjunctivae normal.      Pupils: Pupils are equal, round, and reactive to light. Neck:      Thyroid: No thyromegaly. Vascular: No JVD. Trachea: No tracheal deviation. Cardiovascular:      Rate and Rhythm: Normal rate and regular rhythm. Heart sounds: No murmur heard. No friction rub. No gallop. Pulmonary:      Effort: Pulmonary effort is normal. No respiratory distress. Breath sounds: Normal breath sounds. No wheezing or rales. Abdominal:      General: Bowel sounds are normal. There is no distension. Palpations: Abdomen is soft. There is no mass. Tenderness: There is no abdominal tenderness. There is no guarding or rebound.        Musculoskeletal:         General: Normal range of motion. Cervical back: Normal range of motion and neck supple. Comments: Large anterior thigh fullness with some later stages of ecchymosis/bruising  Small linear healing abrasion but no ulceration or drainage    Some limited RLE ROM with knee flexion/extension due to pain    No loss of motor function   Lymphadenopathy:      Cervical: No cervical adenopathy. Skin:     General: Skin is warm and dry. Coloration: Skin is not pale. Findings: No erythema or rash. Neurological:      Mental Status: She is alert and oriented to person, place, and time. Cranial Nerves: No cranial nerve deficit. Psychiatric:         Behavior: Behavior normal.         Thought Content: Thought content normal.         Judgment: Judgment normal.           ASSESSMENT and PLAN  1. Post traumatic right thigh hematoma. I do not feel that she has active bleeding. She still remains sore and has been applying heat to the area. I attempted aspiration at her request today but only got out 10 ml dark sanguinous fluid. I would not recommend exploration and evacuation of the hematoma at this time and favor ongoing observation. I have recommend ongoing warm compresses to allow liquifaction of the hematoma in hopes for aspiration in the future    2. Sarcoidosis    3.   Chronic abdominal pain    RTC in 4-6 weeks      Radha Wang MD FACS

## 2022-03-18 PROBLEM — M48.02 CERVICAL STENOSIS OF SPINE: Status: ACTIVE | Noted: 2018-04-13

## 2022-03-18 PROBLEM — N12 PYELONEPHRITIS: Status: ACTIVE | Noted: 2017-11-16

## 2022-03-19 PROBLEM — N39.0 UTI (URINARY TRACT INFECTION): Status: ACTIVE | Noted: 2017-11-16

## 2022-03-19 PROBLEM — A41.9 SEVERE SEPSIS (HCC): Status: ACTIVE | Noted: 2017-11-16

## 2022-03-19 PROBLEM — R65.20 SEVERE SEPSIS (HCC): Status: ACTIVE | Noted: 2017-11-16

## 2022-03-19 PROBLEM — S70.11XA HEMATOMA OF RIGHT THIGH: Status: ACTIVE | Noted: 2021-10-06

## 2022-08-03 ENCOUNTER — HOSPITAL ENCOUNTER (EMERGENCY)
Age: 52
Discharge: HOME | End: 2022-08-03
Attending: STUDENT IN AN ORGANIZED HEALTH CARE EDUCATION/TRAINING PROGRAM
Payer: COMMERCIAL

## 2022-08-03 VITALS
WEIGHT: 175 LBS | SYSTOLIC BLOOD PRESSURE: 115 MMHG | TEMPERATURE: 98.2 F | RESPIRATION RATE: 28 BRPM | BODY MASS INDEX: 32.2 KG/M2 | OXYGEN SATURATION: 100 % | HEART RATE: 90 BPM | HEIGHT: 62 IN | DIASTOLIC BLOOD PRESSURE: 79 MMHG

## 2022-08-03 DIAGNOSIS — F10.920 ALCOHOLIC INTOXICATION WITHOUT COMPLICATION (HCC): Primary | ICD-10-CM

## 2022-08-03 LAB
ALBUMIN SERPL-MCNC: 4 G/DL (ref 3.5–5)
ALBUMIN/GLOB SERPL: 1.3 {RATIO} (ref 1.1–2.2)
ALP SERPL-CCNC: 153 U/L (ref 45–117)
ALT SERPL-CCNC: 125 U/L (ref 12–78)
AMPHET UR QL SCN: NEGATIVE
ANION GAP SERPL CALC-SCNC: 7 MMOL/L (ref 5–15)
APAP SERPL-MCNC: <2 UG/ML (ref 10–30)
APPEARANCE UR: CLEAR
AST SERPL-CCNC: 79 U/L (ref 15–37)
ATRIAL RATE: 143 BPM
BACTERIA URNS QL MICRO: NEGATIVE /HPF
BARBITURATES UR QL SCN: NEGATIVE
BASOPHILS # BLD: 0 K/UL (ref 0–0.1)
BASOPHILS NFR BLD: 0 % (ref 0–1)
BENZODIAZ UR QL: NEGATIVE
BILIRUB SERPL-MCNC: 0.4 MG/DL (ref 0.2–1)
BILIRUB UR QL: NEGATIVE
BUN SERPL-MCNC: 15 MG/DL (ref 6–20)
BUN/CREAT SERPL: 24 (ref 12–20)
CALCIUM SERPL-MCNC: 8.6 MG/DL (ref 8.5–10.1)
CALCULATED P AXIS, ECG09: 1 DEGREES
CALCULATED R AXIS, ECG10: 55 DEGREES
CALCULATED T AXIS, ECG11: 3 DEGREES
CANNABINOIDS UR QL SCN: POSITIVE
CHLORIDE SERPL-SCNC: 108 MMOL/L (ref 97–108)
CO2 SERPL-SCNC: 23 MMOL/L (ref 21–32)
COCAINE UR QL SCN: NEGATIVE
COLOR UR: ABNORMAL
CREAT SERPL-MCNC: 0.63 MG/DL (ref 0.55–1.02)
DIAGNOSIS, 93000: NORMAL
DIFFERENTIAL METHOD BLD: NORMAL
DRUG SCRN COMMENT,DRGCM: ABNORMAL
EOSINOPHIL # BLD: 0.1 K/UL (ref 0–0.4)
EOSINOPHIL NFR BLD: 1 % (ref 0–7)
EPITH CASTS URNS QL MICRO: ABNORMAL /LPF
ERYTHROCYTE [DISTWIDTH] IN BLOOD BY AUTOMATED COUNT: 12.2 % (ref 11.5–14.5)
ETHANOL SERPL-MCNC: 197 MG/DL
GLOBULIN SER CALC-MCNC: 3.2 G/DL (ref 2–4)
GLUCOSE BLD STRIP.AUTO-MCNC: 82 MG/DL (ref 65–117)
GLUCOSE SERPL-MCNC: 87 MG/DL (ref 65–100)
GLUCOSE UR STRIP.AUTO-MCNC: NEGATIVE MG/DL
HCT VFR BLD AUTO: 40.7 % (ref 35–47)
HGB BLD-MCNC: 13.1 G/DL (ref 11.5–16)
HGB UR QL STRIP: NEGATIVE
HYALINE CASTS URNS QL MICRO: ABNORMAL /LPF (ref 0–2)
IMM GRANULOCYTES # BLD AUTO: 0 K/UL (ref 0–0.04)
IMM GRANULOCYTES NFR BLD AUTO: 0 % (ref 0–0.5)
KETONES UR QL STRIP.AUTO: NEGATIVE MG/DL
LEUKOCYTE ESTERASE UR QL STRIP.AUTO: ABNORMAL
LYMPHOCYTES # BLD: 2.1 K/UL (ref 0.8–3.5)
LYMPHOCYTES NFR BLD: 31 % (ref 12–49)
MCH RBC QN AUTO: 29.4 PG (ref 26–34)
MCHC RBC AUTO-ENTMCNC: 32.2 G/DL (ref 30–36.5)
MCV RBC AUTO: 91.5 FL (ref 80–99)
METHADONE UR QL: NEGATIVE
MONOCYTES # BLD: 0.5 K/UL (ref 0–1)
MONOCYTES NFR BLD: 7 % (ref 5–13)
NEUTS SEG # BLD: 4.2 K/UL (ref 1.8–8)
NEUTS SEG NFR BLD: 61 % (ref 32–75)
NITRITE UR QL STRIP.AUTO: NEGATIVE
NRBC # BLD: 0 K/UL (ref 0–0.01)
NRBC BLD-RTO: 0 PER 100 WBC
OPIATES UR QL: NEGATIVE
P-R INTERVAL, ECG05: 142 MS
PCP UR QL: NEGATIVE
PH UR STRIP: 5.5 [PH] (ref 5–8)
PLATELET # BLD AUTO: 254 K/UL (ref 150–400)
PMV BLD AUTO: 9.5 FL (ref 8.9–12.9)
POTASSIUM SERPL-SCNC: 3.5 MMOL/L (ref 3.5–5.1)
PROT SERPL-MCNC: 7.2 G/DL (ref 6.4–8.2)
PROT UR STRIP-MCNC: NEGATIVE MG/DL
Q-T INTERVAL, ECG07: 272 MS
QRS DURATION, ECG06: 66 MS
QTC CALCULATION (BEZET), ECG08: 419 MS
RBC # BLD AUTO: 4.45 M/UL (ref 3.8–5.2)
RBC #/AREA URNS HPF: ABNORMAL /HPF (ref 0–5)
SALICYLATES SERPL-MCNC: 9.9 MG/DL (ref 2.8–20)
SERVICE CMNT-IMP: NORMAL
SODIUM SERPL-SCNC: 138 MMOL/L (ref 136–145)
SP GR UR REFRACTOMETRY: 1.01
UA: UC IF INDICATED,UAUC: ABNORMAL
UROBILINOGEN UR QL STRIP.AUTO: 0.2 EU/DL (ref 0.2–1)
VENTRICULAR RATE, ECG03: 143 BPM
WBC # BLD AUTO: 6.9 K/UL (ref 3.6–11)
WBC URNS QL MICRO: ABNORMAL /HPF (ref 0–4)

## 2022-08-03 PROCEDURE — 82962 GLUCOSE BLOOD TEST: CPT

## 2022-08-03 PROCEDURE — 80179 DRUG ASSAY SALICYLATE: CPT

## 2022-08-03 PROCEDURE — 80143 DRUG ASSAY ACETAMINOPHEN: CPT

## 2022-08-03 PROCEDURE — 81001 URINALYSIS AUTO W/SCOPE: CPT

## 2022-08-03 PROCEDURE — 80307 DRUG TEST PRSMV CHEM ANLYZR: CPT

## 2022-08-03 PROCEDURE — 36415 COLL VENOUS BLD VENIPUNCTURE: CPT

## 2022-08-03 PROCEDURE — 93005 ELECTROCARDIOGRAM TRACING: CPT

## 2022-08-03 PROCEDURE — 74011250636 HC RX REV CODE- 250/636: Performed by: STUDENT IN AN ORGANIZED HEALTH CARE EDUCATION/TRAINING PROGRAM

## 2022-08-03 PROCEDURE — 96360 HYDRATION IV INFUSION INIT: CPT

## 2022-08-03 PROCEDURE — 80053 COMPREHEN METABOLIC PANEL: CPT

## 2022-08-03 PROCEDURE — 96361 HYDRATE IV INFUSION ADD-ON: CPT

## 2022-08-03 PROCEDURE — 85025 COMPLETE CBC W/AUTO DIFF WBC: CPT

## 2022-08-03 PROCEDURE — 99284 EMERGENCY DEPT VISIT MOD MDM: CPT

## 2022-08-03 PROCEDURE — 82077 ASSAY SPEC XCP UR&BREATH IA: CPT

## 2022-08-03 RX ADMIN — SODIUM CHLORIDE 1000 ML: 9 INJECTION, SOLUTION INTRAVENOUS at 19:45

## 2022-08-03 NOTE — ED NOTES
Patient pulled IV out, out of bed roaming in room, redirected to stretcher and changed to scrubs, 1:1 provided Samina Show- all personal belongings placed in bags placed on board at door

## 2022-08-03 NOTE — ED NOTES
Patient roaming out of room past ED tech, demanding very rudely for her phone. This RN told patient that I am not her primary nurse and KARYN GOMEZ is in an emergency at the moment and I will find out if she can have her phone. Pt stumbled back to room.

## 2022-08-03 NOTE — ED NOTES
1615-Patient refusing to have new PIV placed at this time; MD notified by ED techImelda. 1830-Patient agreeable to having new PIV placed to get IVF. This nurse attempted to placed PIV unsuccessfully x 2. Will ask ED tech for U/S PIV when night shift arrives at 1900.

## 2022-08-03 NOTE — ED PROVIDER NOTES
EMERGENCY DEPARTMENT HISTORY AND PHYSICAL EXAM      Date: 8/3/2022  Patient Name: Hemal Julian    History of Presenting Illness     Chief Complaint   Patient presents with    Alcohol intoxication      reports finding pt intoxicated at her job after drinking unknown amount of vodka this morning. Pt is lethargic, altered, and tachycardic. HR in triage 145bpm.  reports she has extensive hx of alcohol abuse. HPI: Hemal Julian, 46 y.o. female presents to the ED with cc of alcohol intoxication. History obtained from patient but mostly her  at bedside. She has a history of alcohol use disorder, has been to rehab 4 times.  states that she has consistently relapsed about every 3 months. She just started a new job, she states that she feels a bit stressed. He noticed on Friday that she seemed to be intoxicated. Found 100 proof vodka in her purse. Today he was concerned because she seemed even more out of it than usual, she he had trouble awakening her. She is now alert. She denies any other substance use,  states that she will vape but no other known substances. She has not been good about taking her medications lately per her . She denies any other ingestions, she denies suicidal or homicidal ideation. No falls or trauma. Per  and patient, no recent illnesses, no headaches, fevers, vomiting or diarrhea. There are no other complaints, changes, or physical findings at this time. PCP: Rolanda Rahman MD    No current facility-administered medications on file prior to encounter. Current Outpatient Medications on File Prior to Encounter   Medication Sig Dispense Refill    ondansetron (ZOFRAN ODT) 8 mg disintegrating tablet Take 1 Tab by mouth every eight (8) hours as needed for Nausea. 12 Tab 0    furosemide (LASIX) 20 mg tablet Take 20 mg by mouth daily as needed for Other.  Prn edema (Patient not taking: Reported on 10/6/2021) 0    cyanocobalamin (VITAMIN B12) 1,000 mcg/mL injection 1,000 mcg by IntraMUSCular route every twenty-eight (28) days. FLUoxetine (PROZAC) 40 mg capsule Take 40 mg by mouth daily. ASPIRIN/SALICYLAMIDE/CAFFEINE (BC HEADACHE POWDER PO) Take 1 Packet by mouth daily as needed (headache). (Patient not taking: Reported on 10/6/2021)      levothyroxine (SYNTHROID) 50 mcg tablet Take 50 mcg by mouth Daily (before breakfast). ergocalciferol (VITAMIN D2) 50,000 unit capsule Take 50,000 Units by mouth every Tuesday and Friday.  (Patient not taking: Reported on 10/6/2021)         Past History     Past Medical History:  Past Medical History:   Diagnosis Date    Anemia     Anxiety     Arrhythmia     tachy with exertion; HAS PRN MEDS FOR THIS    Arthritis     osteoarthritis    Autoimmune disease ([address]     sarcoidosis    Bowel trouble     Burning with urination     Chronic pain     Connective tissue disorder ([address]     Depression     NOT SEVERE    Dyspepsia and other specified disorders of function of stomach     Headache     Hypoglycemia     Hypothyroid     Osteoporosis     Prolapse of female genital organs     Sarcoidosis        Past Surgical History:  Past Surgical History:   Procedure Laterality Date    HX BLADDER SUSPENSION      HX CATARACT REMOVAL      HX  SECTION      X2    HX GASTRIC BYPASS  1999    HX HERNIA REPAIR      HX HYSTERECTOMY      HX OTHER SURGICAL      TUMMY TUCK    NEUROLOGICAL PROCEDURE UNLISTED  ,     back surgery        Family History:  Family History   Problem Relation Age of Onset    Hypertension Father     Diabetes Father     Cancer Father         SKIN, \"MOLE\", AND A CANCER OVER EYE    Diabetes Paternal Aunt     Heart Disease Maternal Grandmother     Stroke Maternal Grandmother     Anemia Mother     No Known Problems Brother     Anesth Problems Other         \"REALLY BAD REACTION, I DON'T REMEMBER WHAT HAPPENED\"    No Known Problems Son     No Known Problems Daughter     No Known Problems Daughter        Social History:  Social History     Tobacco Use    Smoking status: Never    Smokeless tobacco: Never   Substance Use Topics    Alcohol use: Yes     Alcohol/week: 1.0 standard drink     Types: 1 Glasses of wine per week    Drug use: No       Allergies:  No Known Allergies      Review of Systems   no fever  No ear pain  No eye pain  no shortness of breath  no chest pain  no abdominal pain  no dysuria  no leg pain  No rash  No lymphadenopathy  No weight loss    Physical Exam   Physical Exam  Constitutional:       General: She is not in acute distress. Appearance: She is not toxic-appearing. HENT:      Head: Normocephalic. Eyes:      Extraocular Movements: Extraocular movements intact. Cardiovascular:      Rate and Rhythm: Normal rate and regular rhythm. Pulmonary:      Effort: Pulmonary effort is normal.      Breath sounds: Normal breath sounds. Abdominal:      Palpations: Abdomen is soft. Tenderness: There is no abdominal tenderness. Musculoskeletal:         General: No tenderness or deformity. Cervical back: Neck supple. Skin:     General: Skin is warm and dry. Neurological:      General: No focal deficit present. Mental Status: She is alert and oriented to person, place, and time. Sensory: No sensory deficit. Motor: No weakness.       Comments: Speech is slightly slurred and slow, she is easily distracted, she smells of alcohol,       Diagnostic Study Results     Labs -     Recent Results (from the past 24 hour(s))   GLUCOSE, POC    Collection Time: 08/03/22  3:16 PM   Result Value Ref Range    Glucose (POC) 82 65 - 117 mg/dL    Performed by Ahmet RAMOS    EKG, 12 LEAD, INITIAL    Collection Time: 08/03/22  3:22 PM   Result Value Ref Range    Ventricular Rate 143 BPM    Atrial Rate 143 BPM    P-R Interval 142 ms    QRS Duration 66 ms    Q-T Interval 272 ms    QTC Calculation (Bezet) 419 ms    Calculated P Axis 1 degrees Calculated R Axis 55 degrees    Calculated T Axis 3 degrees    Diagnosis       Sinus tachycardia  When compared with ECG of 16-NOV-2017 17:39,  No significant change was found     CBC WITH AUTOMATED DIFF    Collection Time: 08/03/22  3:24 PM   Result Value Ref Range    WBC 6.9 3.6 - 11.0 K/uL    RBC 4.45 3.80 - 5.20 M/uL    HGB 13.1 11.5 - 16.0 g/dL    HCT 40.7 35.0 - 47.0 %    MCV 91.5 80.0 - 99.0 FL    MCH 29.4 26.0 - 34.0 PG    MCHC 32.2 30.0 - 36.5 g/dL    RDW 12.2 11.5 - 14.5 %    PLATELET 512 423 - 284 K/uL    MPV 9.5 8.9 - 12.9 FL    NRBC 0.0 0  WBC    ABSOLUTE NRBC 0.00 0.00 - 0.01 K/uL    NEUTROPHILS 61 32 - 75 %    LYMPHOCYTES 31 12 - 49 %    MONOCYTES 7 5 - 13 %    EOSINOPHILS 1 0 - 7 %    BASOPHILS 0 0 - 1 %    IMMATURE GRANULOCYTES 0 0.0 - 0.5 %    ABS. NEUTROPHILS 4.2 1.8 - 8.0 K/UL    ABS. LYMPHOCYTES 2.1 0.8 - 3.5 K/UL    ABS. MONOCYTES 0.5 0.0 - 1.0 K/UL    ABS. EOSINOPHILS 0.1 0.0 - 0.4 K/UL    ABS. BASOPHILS 0.0 0.0 - 0.1 K/UL    ABS. IMM. GRANS. 0.0 0.00 - 0.04 K/UL    DF AUTOMATED     METABOLIC PANEL, COMPREHENSIVE    Collection Time: 08/03/22  3:24 PM   Result Value Ref Range    Sodium 138 136 - 145 mmol/L    Potassium 3.5 3.5 - 5.1 mmol/L    Chloride 108 97 - 108 mmol/L    CO2 23 21 - 32 mmol/L    Anion gap 7 5 - 15 mmol/L    Glucose 87 65 - 100 mg/dL    BUN 15 6 - 20 MG/DL    Creatinine 0.63 0.55 - 1.02 MG/DL    BUN/Creatinine ratio 24 (H) 12 - 20      GFR est AA >60 >60 ml/min/1.73m2    GFR est non-AA >60 >60 ml/min/1.73m2    Calcium 8.6 8.5 - 10.1 MG/DL    Bilirubin, total 0.4 0.2 - 1.0 MG/DL    ALT (SGPT) 125 (H) 12 - 78 U/L    AST (SGOT) 79 (H) 15 - 37 U/L    Alk.  phosphatase 153 (H) 45 - 117 U/L    Protein, total 7.2 6.4 - 8.2 g/dL    Albumin 4.0 3.5 - 5.0 g/dL    Globulin 3.2 2.0 - 4.0 g/dL    A-G Ratio 1.3 1.1 - 2.2     ETHYL ALCOHOL    Collection Time: 08/03/22  3:24 PM   Result Value Ref Range    ALCOHOL(ETHYL),SERUM 197 (H) <10 MG/DL       Radiologic Studies -   No orders to display     CT Results  (Last 48 hours)      None          CXR Results  (Last 48 hours)      None              Medical Decision Making   I am the first provider for this patient. I reviewed the vital signs, available nursing notes, past medical history, past surgical history, family history and social history. Vital Signs-Reviewed the patient's vital signs. Patient Vitals for the past 24 hrs:   Temp Pulse Resp BP SpO2   08/03/22 1548 -- (!) 126 22 -- 95 %   08/03/22 1546 -- -- -- 116/76 96 %   08/03/22 1516 98.2 °F (36.8 °C) (!) 145 18 122/66 95 %         Provider Notes (Medical Decision Making):   66-year-old female presenting with alcohol intoxication. Has history of chronic alcohol use disorder with recurrent relapses. Presentation today consistent with alcohol intoxication. Her neurologic exam is otherwise nonfocal, no signs of trauma, no fever, she is nontoxic-appearing, unlikely any other acute intracranial emergency. Will assess for any associated electrolyte or metabolic abnormalities, dehydration or coingestants. Tachycardic in triage which improves in the room, concern for volume depletion, will give IV fluids and assess for arrhythmia with EKG. She otherwise denies cardiopulmonary complaints. ED Course:     Initial assessment performed. The patients presenting problems have been discussed, and they are in agreement with the care plan formulated and outlined with them. I have encouraged them to ask questions as they arise throughout their visit. EKG is performed at 15: 22, shows sinus tachycardia at a rate of 143, , QRS 66, QTc 419, axis upright, no ST segment elevation or depression concerning for ACS, this is interpreted as normal sinus tachycardia. CBC negative for leukocytosis or anemia, basic metabolic panel with normal renal function, no worrisome electrolyte abnormalities. Mild transaminitis with AST/ALT of 79/125. Likely in setting of alcohol use chronically. Alcohol level elevated at 197. Patient is observed until clinically sober, alert and oriented, ambulates with steady gait, tolerates p.o., she is no longer tachycardic on reevaluation, vitals are unremarkable. Patient is counseled on supportive care and return precautions. Will return to the ED for any new or worrisome symptoms. Explained to  as well that she will need close outpatient follow-up with addiction resources and primary care doctor. Will followup with primary care doctor, addiction resources within 2 days. Critical Care Time:         Disposition:  Home    PLAN:  1. Current Discharge Medication List        2.    Follow-up Information    None       Return to ED if worse     Diagnosis     Clinical Impression: Acute alcohol intoxication

## 2022-08-03 NOTE — ED NOTES
1915-Handoff report given to KARYN Lopez on Asa Iniguez by this nurse. Sitter bedside; patient resting at this time.

## 2022-08-03 NOTE — ED NOTES
1925: TRANSFER - IN REPORT:    Verbal report received from [facility] (name[facility]. Report consisted of patients Situation, Background, Assessment and   Recommendations(SBAR). Information from the following report(s) SBAR and ED Summary was reviewed with the receiving nurse. Opportunity for questions and clarification was provided. Report received at this time. Pt has no IV access at this time. Previous RN reported pt removed her IV, and there has been difficulty obtaining IV access.

## 2022-08-04 NOTE — DISCHARGE INSTRUCTIONS
Substance Abuse and Addiction Resources    [facility]  [phone]  Closed meeting- family members that have been affected bysomeone alcohol abuse  Open meeting everyone can attend. Alcoholic's Anonymous [phone]  Non professional (alcoholics in recovery helping others)  [employer] [facility] (talk about sobriety, have desire)  No charge    [address]  Adalid Ramires is the Treatment Consultant in the [facility]  Free one-on-one phone consultation and insurance verification  12 step based meetings and philosophy  Family programs and sessions    [facility] [phone]  Free individual assessment  Intensive outpatient outpatient program  Ambulatory withdrawal management/detoxification  Insurance required    [facility] [phone] ext [phone] or [phone] for patients with no insurance, Medicaid, Medicare  Sliding fee scale available for self pay  Patients go Monday-Friday from 8-4:30 to central intake for a shelter and then to [facility] for services  Offers a variety of services I.e. Laundry, shower, eye clinic, medical clinic, dental, substance abuse services, case management, etc.    [facility] [phone]  Make appointment with counselor  $32 fee for initial hour intake    [place] [phone]  Offers Detox and Inpatient Treatment for men only. Social detox  Is a homeless shelter with longterm 12 step program. Can choose just access to the hshelter component  Must be able to walk <1miles one way to attend classes, be highly motivated and willing to complete all 12 steps. 8 months- 1 year is the typical length of stay if accepted    [facility] [phone]  For residents of [place]  They offer outpatient treatment and can make referrals for inpatient careBased on income. Will [employer]. Accept Medicaid.   They have a walk in clinic that patients can go to be screened for services. Two on the [facility] and [place] [facility]:   [address] (near [facility]). Walk in hours: Mon or Wed       12:30pm - [address]. Walk in hours: Tuesday 12:30pm 3pm Wed       8:30am- 11am    [employer] [phone]  $3500 entry fee  12 step meeting plan  No sex offenders accepted. Must get a job within 30 days after admission  After the 12 week, additional $125/week to stay    Narcotic Anonymous [phone], [phone]  Will refer to [facility] into Triage (takes 10-15 minutes)  Proof of [facility] residence with Picture ID  Medicaid and Self Pay. NO Private insurance    [place] [phone]  Referrals come from organizations like [facility], [employer], [facility]. Must be self pay. No insurance allowed. No patients on prescribed narcotics. No sex offenders. Need all medical mental health information and medication list sent prior to admit. [facility] [phone] ext [phone] between 21-65  Need social security card and picture ID  Must pass breath test and 10 panel Urine Drug Screen  No Sex Offenders or Psychiatric patients  Must not have been a 3x repeat at this facility  6 month in house- has to participate in work therapy 40 hours/week  Participates in spiritual classes, bible study and Orthodox    [place] [facility] [address], sent by [employer]  No Sex [address] [facility] (by [facility] [address])    [address] [place] [phone]  Monday through Friday 8:30am to 5:00pm  Brief Telephone Interview. Then will be scheduled for next substance abuse services orientation group and then scheduled for intake interview.     [facility]  [phone]  Walk in Monday through Friday 9:00AM to 3:00PM  Bring Picture ID, Proof of residence (piece of mail), Proof of Insurance (Medicaid/sliding scale), Proof of income (any)    [facility] [phone]  Walk in then Assessment by licensed professional   [facility] office ([address] [facility]) Monday, Tuesday, Thursday  9AM to 789 [facility] office ([address] [place] [address], [address]) [address]  [phone]  Walk In Monday to Friday starting at [address] ID, Proof of residence (piece of mail), Proof of insurance (Medicaid/sliding scale)  At 9AM is the Substance Abuse Services Orientation Group and then scheduled for Intake Evaluation.

## 2022-09-06 NOTE — PROGRESS NOTES
Sent Fosfomycin to the pharm for third dose. Referred to Urology.  If unable to be seen by Monday, pt will return to the ED dialysis catheter taking out

## 2023-09-10 ENCOUNTER — HOSPITAL ENCOUNTER (EMERGENCY)
Facility: HOSPITAL | Age: 53
Discharge: HOME | End: 2023-09-11
Attending: EMERGENCY MEDICINE
Payer: COMMERCIAL

## 2023-09-10 DIAGNOSIS — F10.920 ACUTE ALCOHOLIC INTOXICATION WITHOUT COMPLICATION (HCC): Primary | ICD-10-CM

## 2023-09-10 LAB
ANION GAP SERPL CALC-SCNC: 8 MMOL/L (ref 5–15)
BASOPHILS # BLD: 0 K/UL (ref 0–0.1)
BASOPHILS NFR BLD: 0 % (ref 0–1)
BUN SERPL-MCNC: 13 MG/DL (ref 6–20)
BUN/CREAT SERPL: 20 (ref 12–20)
CALCIUM SERPL-MCNC: 8.4 MG/DL (ref 8.5–10.1)
CHLORIDE SERPL-SCNC: 114 MMOL/L (ref 97–108)
CO2 SERPL-SCNC: 24 MMOL/L (ref 21–32)
CREAT SERPL-MCNC: 0.66 MG/DL (ref 0.55–1.02)
DIFFERENTIAL METHOD BLD: NORMAL
EOSINOPHIL # BLD: 0.2 K/UL (ref 0–0.4)
EOSINOPHIL NFR BLD: 4 % (ref 0–7)
ERYTHROCYTE [DISTWIDTH] IN BLOOD BY AUTOMATED COUNT: 13.7 % (ref 11.5–14.5)
ETHANOL SERPL-MCNC: 295 MG/DL (ref 0–0.08)
GLUCOSE SERPL-MCNC: 97 MG/DL (ref 65–100)
HCT VFR BLD AUTO: 39.2 % (ref 35–47)
HGB BLD-MCNC: 12.4 G/DL (ref 11.5–16)
IMM GRANULOCYTES # BLD AUTO: 0 K/UL (ref 0–0.04)
IMM GRANULOCYTES NFR BLD AUTO: 0 % (ref 0–0.5)
LYMPHOCYTES # BLD: 1.7 K/UL (ref 0.8–3.5)
LYMPHOCYTES NFR BLD: 37 % (ref 12–49)
MCH RBC QN AUTO: 29.7 PG (ref 26–34)
MCHC RBC AUTO-ENTMCNC: 31.6 G/DL (ref 30–36.5)
MCV RBC AUTO: 93.8 FL (ref 80–99)
MONOCYTES # BLD: 0.4 K/UL (ref 0–1)
MONOCYTES NFR BLD: 8 % (ref 5–13)
NEUTS SEG # BLD: 2.4 K/UL (ref 1.8–8)
NEUTS SEG NFR BLD: 51 % (ref 32–75)
NRBC # BLD: 0 K/UL (ref 0–0.01)
NRBC BLD-RTO: 0 PER 100 WBC
PLATELET # BLD AUTO: 226 K/UL (ref 150–400)
PMV BLD AUTO: 9.6 FL (ref 8.9–12.9)
POTASSIUM SERPL-SCNC: 3.5 MMOL/L (ref 3.5–5.1)
RBC # BLD AUTO: 4.18 M/UL (ref 3.8–5.2)
SODIUM SERPL-SCNC: 146 MMOL/L (ref 136–145)
WBC # BLD AUTO: 4.7 K/UL (ref 3.6–11)

## 2023-09-10 PROCEDURE — 80048 BASIC METABOLIC PNL TOTAL CA: CPT

## 2023-09-10 PROCEDURE — 85025 COMPLETE CBC W/AUTO DIFF WBC: CPT

## 2023-09-10 PROCEDURE — 12011 RPR F/E/E/N/L/M 2.5 CM/<: CPT

## 2023-09-10 PROCEDURE — 82077 ASSAY SPEC XCP UR&BREATH IA: CPT

## 2023-09-10 PROCEDURE — 99284 EMERGENCY DEPT VISIT MOD MDM: CPT

## 2023-09-10 PROCEDURE — 36415 COLL VENOUS BLD VENIPUNCTURE: CPT

## 2023-09-10 ASSESSMENT — LIFESTYLE VARIABLES
HOW MANY STANDARD DRINKS CONTAINING ALCOHOL DO YOU HAVE ON A TYPICAL DAY: 1 OR 2
HOW OFTEN DO YOU HAVE A DRINK CONTAINING ALCOHOL: MONTHLY OR LESS

## 2023-09-10 ASSESSMENT — PAIN - FUNCTIONAL ASSESSMENT: PAIN_FUNCTIONAL_ASSESSMENT: NONE - DENIES PAIN

## 2023-09-10 ASSESSMENT — ENCOUNTER SYMPTOMS: ABDOMINAL PAIN: 0

## 2023-09-11 VITALS
OXYGEN SATURATION: 94 % | HEART RATE: 101 BPM | DIASTOLIC BLOOD PRESSURE: 63 MMHG | WEIGHT: 200 LBS | SYSTOLIC BLOOD PRESSURE: 103 MMHG | BODY MASS INDEX: 33.32 KG/M2 | TEMPERATURE: 97.9 F | HEIGHT: 65 IN | RESPIRATION RATE: 18 BRPM

## 2023-09-11 PROCEDURE — 12011 RPR F/E/E/N/L/M 2.5 CM/<: CPT

## 2023-09-11 PROCEDURE — 2500000003 HC RX 250 WO HCPCS: Performed by: EMERGENCY MEDICINE

## 2023-09-11 RX ORDER — LIDOCAINE HYDROCHLORIDE 20 MG/ML
5 INJECTION, SOLUTION EPIDURAL; INFILTRATION; INTRACAUDAL; PERINEURAL
Status: COMPLETED | OUTPATIENT
Start: 2023-09-11 | End: 2023-09-11

## 2023-09-11 RX ADMIN — LIDOCAINE HYDROCHLORIDE 5 ML: 20 INJECTION, SOLUTION EPIDURAL; INFILTRATION; INTRACAUDAL; PERINEURAL at 00:22

## 2023-09-11 NOTE — DISCHARGE INSTRUCTIONS
You are seen in the emergency department today for alcohol intoxication. We observed you initially were safe until you were more sober. We recommend that you follow-up with your primary care provider and speak with somebody about substance use disorder.   Please reach out to us if there is any way that we can help you    You can always return to the emergency department and please return if you experience further difficulty with alcohol use disorder

## 2023-09-11 NOTE — ED PROVIDER NOTES
Hypoglycemia     Hypothyroid     Osteoporosis     Prolapse of female genital organs     Sarcoidosis        Past Surgical History:  Past Surgical History:   Procedure Laterality Date    BLADDER SUSPENSION      CATARACT REMOVAL       SECTION      X2    GASTRIC BYPASS SURGERY  1999    HERNIA REPAIR      HYSTERECTOMY (CERVIX STATUS UNKNOWN)  2007    NEUROLOGICAL SURGERY  ,     back surgery     OTHER SURGICAL HISTORY      MICHELLE GUPTA       Family History:  Family History   Problem Relation Age of Onset    No Known Problems Daughter     No Known Problems Daughter     No Known Problems Son     Anesth Problems Other         \"REALLY BAD REACTION, I DON'T REMEMBER WHAT HAPPENED\"    Hypertension Father     No Known Problems Brother     Cancer Father         SKIN, \"MOLE\", AND A CANCER OVER EYE    Diabetes Paternal Aunt     Heart Disease Maternal Grandmother     Stroke Maternal Grandmother     Anemia Mother     Diabetes Father        Social History:  Social History     Tobacco Use    Smoking status: Never    Smokeless tobacco: Never   Substance Use Topics    Alcohol use:  Yes     Alcohol/week: 1.0 standard drink of alcohol    Drug use: No       Allergies:  No Known Allergies    CURRENT MEDICATIONS      Current Discharge Medication List        CONTINUE these medications which have NOT CHANGED    Details   cyanocobalamin 1000 MCG/ML injection Inject 1,000 mcg into the muscle      ergocalciferol (ERGOCALCIFEROL) 1.25 MG (33089 UT) capsule Take 50,000 Units by mouth      FLUoxetine (PROZAC) 40 MG capsule Take 40 mg by mouth daily      furosemide (LASIX) 20 MG tablet Take 20 mg by mouth daily as needed      levothyroxine (SYNTHROID) 50 MCG tablet Take 50 mcg by mouth every morning (before breakfast)      ondansetron (ZOFRAN-ODT) 8 MG TBDP disintegrating tablet Take 8 mg by mouth every 8 hours as needed             PHYSICAL EXAM      ED Triage Vitals [09/10/23 8339]   Enc Vitals Group      /62      Pulse (!)

## 2023-09-11 NOTE — ED TRIAGE NOTES
EMS called to patient's work stating that patient passed out after drinking a \"fireball\" per staff patient was easily arousable, patient alert and oriented x 4 upon arrival to ED, denies pain, denies hitting her head.  Patient denies any intent for self harm

## 2023-09-11 NOTE — ED NOTES
Patient brought back in with [employer], patient was walking home, fell and hit her face on concrete, patient's face is bloody, laceration below the nose noted, bleeding controlled, patient alert and oriented x 4     Odette Burns RN  09/11/23 0000

## 2024-11-18 NOTE — ED NOTES
Problem: Potential for Falls  Goal: Patient will remain free of falls  Description: INTERVENTIONS:  - Educate patient/family on patient safety including physical limitations  - Instruct patient to call for assistance with activity   - Keep Call bell within reach  Outcome: Progressing     Problem: Knowledge Deficit  Goal: Patient/family/caregiver demonstrates understanding of disease process, treatment plan, medications, and discharge instructions  Description: Complete learning assessment and assess knowledge base.  Interventions:  - Provide teaching at level of understanding  - Provide teaching via preferred learning methods  Outcome: Progressing      Patient discharged by Karin Pascal DO. Patient provided with discharge instructions Rx and instructions on follow up care. Patient out of ED ambulatory accompanied by self.

## (undated) DEVICE — PREP SKN PREVAIL 40ML APPL --

## (undated) DEVICE — SOLUTION IV 250ML 0.9% SOD CHL CLR INJ FLX BG CONT PRT CLSR

## (undated) DEVICE — DRAPE,LAPAROTOMY,T,PEDI,STERILE: Brand: MEDLINE

## (undated) DEVICE — DRAIN SURG W7MMXL20CM SIL FULL PERF HUBLESS FLAT RADPQ STRP

## (undated) DEVICE — SYR LR LCK 1ML GRAD NSAF 30ML --

## (undated) DEVICE — Device

## (undated) DEVICE — SPONGE: SPECIALTY PEANUT XR 100/CS: Brand: MEDICAL ACTION INDUSTRIES

## (undated) DEVICE — SUTURE VCRL 2-0 L27IN ABSRB UD CP-2 L26MM 1/2 CIR REV CUT J869H

## (undated) DEVICE — DEVICE SKIN CLOSURE 4 MM INCISION

## (undated) DEVICE — INFECTION CONTROL KIT SYS

## (undated) DEVICE — BONE WAX WHITE: Brand: BONE WAX WHITE

## (undated) DEVICE — INSULATED BLADE ELECTRODE: Brand: EDGE

## (undated) DEVICE — HANDLE LT SNAP ON ULT DURABLE LENS FOR TRUMPF ALC DISPOSABLE

## (undated) DEVICE — 12MM DRILL

## (undated) DEVICE — DRAPE XR C ARM 41X74IN LF --

## (undated) DEVICE — BIPOLAR IRRIGATOR INTEGRATED TUBING AND BIPOLAR CORD SET, DISPOSABLE

## (undated) DEVICE — KENDALL SCD EXPRESS SLEEVES, KNEE LENGTH, MEDIUM: Brand: KENDALL SCD

## (undated) DEVICE — COVER,TABLE,HEAVY DUTY,60"X90",STRL: Brand: MEDLINE

## (undated) DEVICE — NDL SPNE QNCKE 18GX3.5IN LF --

## (undated) DEVICE — 1200 GUARD II KIT W/5MM TUBE W/O VAC TUBE: Brand: GUARDIAN

## (undated) DEVICE — TOOL 14MH30 LEGEND 14CM 3MM: Brand: MIDAS REX ™

## (undated) DEVICE — GAUZE SPONGES,12 PLY: Brand: CURITY

## (undated) DEVICE — LAMINECTOMY RICHMOND-LF: Brand: MEDLINE INDUSTRIES, INC.

## (undated) DEVICE — SOLUTION IV 1000ML 0.9% SOD CHL

## (undated) DEVICE — SUTURE MCRYL SZ 4-0 L18IN ABSRB UD L16MM PC-3 3/8 CIR PRIM Y845G

## (undated) DEVICE — STERILE POLYISOPRENE POWDER-FREE SURGICAL GLOVES WITH EMOLLIENT COATING: Brand: PROTEXIS

## (undated) DEVICE — CATH IV AUTOGRD BC GRN 18GA 30 -- INSYTE